# Patient Record
Sex: FEMALE | Race: ASIAN | NOT HISPANIC OR LATINO | Employment: FULL TIME | URBAN - METROPOLITAN AREA
[De-identification: names, ages, dates, MRNs, and addresses within clinical notes are randomized per-mention and may not be internally consistent; named-entity substitution may affect disease eponyms.]

---

## 2023-03-11 LAB — SARS-COV-2 RESULT: NOT DETECTED

## 2023-04-27 LAB
BASOPHILS (10*3/UL) IN BLOOD BY AUTOMATED COUNT: 0.01 X10E9/L (ref 0–0.1)
BASOPHILS/100 LEUKOCYTES IN BLOOD BY AUTOMATED COUNT: 0.2 % (ref 0–2)
EOSINOPHILS (10*3/UL) IN BLOOD BY AUTOMATED COUNT: 0.05 X10E9/L (ref 0–0.7)
EOSINOPHILS/100 LEUKOCYTES IN BLOOD BY AUTOMATED COUNT: 1 % (ref 0–6)
ERYTHROCYTE DISTRIBUTION WIDTH (RATIO) BY AUTOMATED COUNT: 12.9 % (ref 11.5–14.5)
ERYTHROCYTE MEAN CORPUSCULAR HEMOGLOBIN CONCENTRATION (G/DL) BY AUTOMATED: 32.3 G/DL (ref 32–36)
ERYTHROCYTE MEAN CORPUSCULAR VOLUME (FL) BY AUTOMATED COUNT: 90 FL (ref 80–100)
ERYTHROCYTES (10*6/UL) IN BLOOD BY AUTOMATED COUNT: 4.04 X10E12/L (ref 4–5.2)
ESTIMATED AVERAGE GLUCOSE FOR HBA1C: 103 MG/DL
HEMATOCRIT (%) IN BLOOD BY AUTOMATED COUNT: 36.5 % (ref 36–46)
HEMOGLOBIN (G/DL) IN BLOOD: 11.8 G/DL (ref 12–16)
HEMOGLOBIN A1C/HEMOGLOBIN TOTAL IN BLOOD: 5.2 %
IMMATURE GRANULOCYTES/100 LEUKOCYTES IN BLOOD BY AUTOMATED COUNT: 0.4 % (ref 0–0.9)
LEUKOCYTES (10*3/UL) IN BLOOD BY AUTOMATED COUNT: 5.1 X10E9/L (ref 4.4–11.3)
LYMPHOCYTES (10*3/UL) IN BLOOD BY AUTOMATED COUNT: 1.86 X10E9/L (ref 1.2–4.8)
LYMPHOCYTES/100 LEUKOCYTES IN BLOOD BY AUTOMATED COUNT: 36.5 % (ref 13–44)
MONOCYTES (10*3/UL) IN BLOOD BY AUTOMATED COUNT: 0.36 X10E9/L (ref 0.1–1)
MONOCYTES/100 LEUKOCYTES IN BLOOD BY AUTOMATED COUNT: 7.1 % (ref 2–10)
NEUTROPHILS (10*3/UL) IN BLOOD BY AUTOMATED COUNT: 2.79 X10E9/L (ref 1.2–7.7)
NEUTROPHILS/100 LEUKOCYTES IN BLOOD BY AUTOMATED COUNT: 54.8 % (ref 40–80)
NRBC (PER 100 WBCS) BY AUTOMATED COUNT: 0 /100 WBC (ref 0–0)
PLATELETS (10*3/UL) IN BLOOD AUTOMATED COUNT: 317 X10E9/L (ref 150–450)

## 2023-04-28 LAB
CALCIDIOL (25 OH VITAMIN D3) (NG/ML) IN SER/PLAS: 19 NG/ML
THYROTROPIN (MIU/L) IN SER/PLAS BY DETECTION LIMIT <= 0.05 MIU/L: 2.76 MIU/L (ref 0.44–3.98)

## 2023-11-30 DIAGNOSIS — Z34.90 PREGNANCY, UNSPECIFIED GESTATIONAL AGE (HHS-HCC): Primary | ICD-10-CM

## 2023-12-07 ENCOUNTER — INITIAL PRENATAL (OUTPATIENT)
Dept: OBSTETRICS AND GYNECOLOGY | Facility: CLINIC | Age: 33
End: 2023-12-07
Payer: COMMERCIAL

## 2023-12-07 ENCOUNTER — LAB (OUTPATIENT)
Dept: LAB | Facility: LAB | Age: 33
End: 2023-12-07
Payer: COMMERCIAL

## 2023-12-07 VITALS — SYSTOLIC BLOOD PRESSURE: 114 MMHG | WEIGHT: 131.8 LBS | DIASTOLIC BLOOD PRESSURE: 74 MMHG | BODY MASS INDEX: 24.9 KG/M2

## 2023-12-07 DIAGNOSIS — E55.9 VITAMIN D DEFICIENCY: ICD-10-CM

## 2023-12-07 DIAGNOSIS — Z32.01 ENCOUNTER FOR PREGNANCY TEST, RESULT POSITIVE (HHS-HCC): ICD-10-CM

## 2023-12-07 DIAGNOSIS — Z3A.09 9 WEEKS GESTATION OF PREGNANCY (HHS-HCC): ICD-10-CM

## 2023-12-07 DIAGNOSIS — Z82.79 FAMILY HISTORY OF NEURAL TUBE DEFECT: ICD-10-CM

## 2023-12-07 DIAGNOSIS — Z3A.09 9 WEEKS GESTATION OF PREGNANCY (HHS-HCC): Primary | ICD-10-CM

## 2023-12-07 DIAGNOSIS — K21.9 GASTROESOPHAGEAL REFLUX DISEASE, UNSPECIFIED WHETHER ESOPHAGITIS PRESENT: ICD-10-CM

## 2023-12-07 PROBLEM — R31.21 ASYMPTOMATIC MICROSCOPIC HEMATURIA: Status: ACTIVE | Noted: 2023-12-07

## 2023-12-07 PROBLEM — L70.0 ACNE VULGARIS: Status: ACTIVE | Noted: 2021-07-06

## 2023-12-07 PROBLEM — M62.89 HIGH-TONE PELVIC FLOOR DYSFUNCTION: Status: ACTIVE | Noted: 2023-12-07

## 2023-12-07 PROBLEM — K29.70 GASTRITIS: Status: ACTIVE | Noted: 2023-12-07

## 2023-12-07 LAB
ERYTHROCYTE [DISTWIDTH] IN BLOOD BY AUTOMATED COUNT: 12.3 % (ref 11.5–14.5)
EST. AVERAGE GLUCOSE BLD GHB EST-MCNC: 100 MG/DL
HBA1C MFR BLD: 5.1 %
HCT VFR BLD AUTO: 38.1 % (ref 36–46)
HGB BLD-MCNC: 12.7 G/DL (ref 12–16)
MCH RBC QN AUTO: 29.1 PG (ref 26–34)
MCHC RBC AUTO-ENTMCNC: 33.3 G/DL (ref 32–36)
MCV RBC AUTO: 87 FL (ref 80–100)
NRBC BLD-RTO: 0 /100 WBCS (ref 0–0)
PLATELET # BLD AUTO: 309 X10*3/UL (ref 150–450)
PREGNANCY TEST URINE, POC: POSITIVE
RBC # BLD AUTO: 4.37 X10*6/UL (ref 4–5.2)
REFLEX ADDED, ANEMIA PANEL: NORMAL
WBC # BLD AUTO: 8.9 X10*3/UL (ref 4.4–11.3)

## 2023-12-07 PROCEDURE — 87800 DETECT AGNT MULT DNA DIREC: CPT | Performed by: ADVANCED PRACTICE MIDWIFE

## 2023-12-07 PROCEDURE — 86803 HEPATITIS C AB TEST: CPT

## 2023-12-07 PROCEDURE — 87086 URINE CULTURE/COLONY COUNT: CPT | Performed by: ADVANCED PRACTICE MIDWIFE

## 2023-12-07 PROCEDURE — 83020 HEMOGLOBIN ELECTROPHORESIS: CPT | Performed by: ADVANCED PRACTICE MIDWIFE

## 2023-12-07 PROCEDURE — 86787 VARICELLA-ZOSTER ANTIBODY: CPT

## 2023-12-07 PROCEDURE — 0500F INITIAL PRENATAL CARE VISIT: CPT | Performed by: ADVANCED PRACTICE MIDWIFE

## 2023-12-07 PROCEDURE — 82306 VITAMIN D 25 HYDROXY: CPT

## 2023-12-07 PROCEDURE — 87389 HIV-1 AG W/HIV-1&-2 AB AG IA: CPT

## 2023-12-07 PROCEDURE — 36415 COLL VENOUS BLD VENIPUNCTURE: CPT

## 2023-12-07 PROCEDURE — 86901 BLOOD TYPING SEROLOGIC RH(D): CPT

## 2023-12-07 PROCEDURE — 84443 ASSAY THYROID STIM HORMONE: CPT

## 2023-12-07 PROCEDURE — 86780 TREPONEMA PALLIDUM: CPT

## 2023-12-07 PROCEDURE — 86850 RBC ANTIBODY SCREEN: CPT

## 2023-12-07 PROCEDURE — 83036 HEMOGLOBIN GLYCOSYLATED A1C: CPT

## 2023-12-07 PROCEDURE — 85027 COMPLETE CBC AUTOMATED: CPT

## 2023-12-07 PROCEDURE — 86317 IMMUNOASSAY INFECTIOUS AGENT: CPT

## 2023-12-07 PROCEDURE — 87340 HEPATITIS B SURFACE AG IA: CPT

## 2023-12-07 PROCEDURE — 86900 BLOOD TYPING SEROLOGIC ABO: CPT

## 2023-12-07 PROCEDURE — 83021 HEMOGLOBIN CHROMOTOGRAPHY: CPT

## 2023-12-07 RX ORDER — ERGOCALCIFEROL 1.25 MG/1
1 CAPSULE ORAL WEEKLY
COMMUNITY
Start: 2021-09-02

## 2023-12-07 RX ORDER — FAMOTIDINE 20 MG/1
20 TABLET, FILM COATED ORAL DAILY
Qty: 30 TABLET | Refills: 1 | Status: SHIPPED | OUTPATIENT
Start: 2023-12-07 | End: 2024-12-06

## 2023-12-07 RX ORDER — FAMOTIDINE 20 MG/1
TABLET, FILM COATED ORAL
COMMUNITY
Start: 2014-10-30 | End: 2023-12-07 | Stop reason: ALTCHOICE

## 2023-12-07 ASSESSMENT — EDINBURGH POSTNATAL DEPRESSION SCALE (EPDS)
I HAVE LOOKED FORWARD WITH ENJOYMENT TO THINGS: AS MUCH AS I EVER DID
I HAVE BLAMED MYSELF UNNECESSARILY WHEN THINGS WENT WRONG: NO, NEVER
THINGS HAVE BEEN GETTING ON TOP OF ME: NO, I HAVE BEEN COPING AS WELL AS EVER
THE THOUGHT OF HARMING MYSELF HAS OCCURRED TO ME: NEVER
I HAVE BEEN SO UNHAPPY THAT I HAVE BEEN CRYING: NO, NEVER
TOTAL SCORE: 0
I HAVE BEEN SO UNHAPPY THAT I HAVE HAD DIFFICULTY SLEEPING: NOT AT ALL
I HAVE BEEN ABLE TO LAUGH AND SEE THE FUNNY SIDE OF THINGS: AS MUCH AS I ALWAYS COULD
I HAVE FELT SCARED OR PANICKY FOR NO GOOD REASON: NO, NOT AT ALL
I HAVE FELT SAD OR MISERABLE: NO, NOT AT ALL
I HAVE BEEN ANXIOUS OR WORRIED FOR NO GOOD REASON: NO, NOT AT ALL

## 2023-12-07 NOTE — PROGRESS NOTES
Subjective   Patient ID [id number]   Rex Murphy is a 33 y.o.  at 9w0d with a working estimated date of delivery of 2024, by Last Menstrual Period who presents for an initial prenatal visit. This pregnancy is planned. 5 week took a home + [facility] in mid November.    Patient does not want to know sex of baby     Father of baby born with something on spine had to have surgery- suspected myelo meningioma   Her pregnancy is complicated by:  N/a    OB History    Para Term  AB Living   1             SAB IAB Ectopic Multiple Live Births                  # Outcome Date GA Lbr Kay[place]/2nd Weight Sex Delivery Anes PTL Lv   1 Current              [place]  Depression Scale Total: 0    Objective   Physical Exam  Weight: 59.8 kg (131 lb 12.8 oz)  Expected Total Weight Gain: 11.5 kg (25 lb)-16 kg (35 lb)   Pregravid BMI: 24.58  BP: 114/74 (HR 87)          Physical Exam  Constitutional:       Appearance: Normal appearance. She is normal weight.   Genitourinary:      Genitourinary Comments: deferred   Pulmonary:      Effort: Pulmonary effort is normal.      Breath sounds: Normal breath sounds.   Abdominal:      General: Abdomen is flat.      Palpations: Abdomen is soft.   Neurological:      Mental Status: She is alert.   Psychiatric:         Mood and Affect: Mood normal.         Behavior: Behavior normal.         Thought Content: Thought content normal.         Judgment: Judgment normal.         Prenatal Labs  Ordered additional TSH    Assessment/Plan   Problem List Items Addressed This Visit             ICD-10-CM    Family history of neural tube defect Z82.79     Patient and  will get more info-  Genetics consult placed           Other Visit Diagnoses         Codes    9 weeks gestation of pregnancy    -  Primary Z3A.09    Relevant Medications    famotidine (Pepcid) 20 mg tablet    Other Relevant Orders    CBC Anemia Panel With Reflex,Pregnancy    Type And Screen    Hemoglobin Identification with Path  Review    C. Trachomatis / N. Gonorrhoeae, Amplified Detection    Syphilis Screen with Reflex    Rubella Antibody, IgG    Hepatitis C Antibody    Urine Culture    Hepatitis B Surface Antigen    HIV 1/2 Antigen/Antibody Screen with Reflex to Confirmation    Hemoglobin A1C    Varicella Zoster Antibody, IgG    Referral to Genetics    [facility] imaging order    TSH with reflex to Free T4 if abnormal    Encounter for pregnancy test, result positive     Z32.01    Relevant Orders    POC pregnancy, urine (Completed)    TSH with reflex to Free T4 if abnormal    Vitamin D deficiency     E55.9    Relevant Orders    Vitamin D 25-Hydroxy,Total (for eval of Vitamin D levels)    Gastroesophageal reflux disease, unspecified whether esophagitis present     K21.9    Relevant Medications    famotidine (Pepcid) 20 mg tablet            Immunizations: reviewed with patient flus hot already complete   Prenatal Labs ordered  Daily prenatal vitamins prescribed  First trimester screening and second trimester screening discussed. Patient decided to go ahead with cffDNA will see genetics first  Follow up in 4 weeks for return OB visit.

## 2023-12-08 ENCOUNTER — ANCILLARY PROCEDURE (OUTPATIENT)
Dept: RADIOLOGY | Facility: CLINIC | Age: 33
End: 2023-12-08
Payer: COMMERCIAL

## 2023-12-08 DIAGNOSIS — Z3A.09 9 WEEKS GESTATION OF PREGNANCY (HHS-HCC): ICD-10-CM

## 2023-12-08 LAB
25(OH)D3 SERPL-MCNC: 37 NG/ML (ref 30–100)
ABO GROUP (TYPE) IN BLOOD: NORMAL
ANTIBODY SCREEN: NORMAL
BACTERIA UR CULT: NO GROWTH
C TRACH RRNA SPEC QL NAA+PROBE: NEGATIVE
HBV SURFACE AG SERPL QL IA: NONREACTIVE
HCV AB SER QL: NONREACTIVE
HIV 1+2 AB+HIV1 P24 AG SERPL QL IA: NONREACTIVE
N GONORRHOEA DNA SPEC QL PROBE+SIG AMP: NEGATIVE
RH FACTOR (ANTIGEN D): NORMAL
RUBV IGG SERPL IA-ACNC: 4.2 IA
RUBV IGG SERPL QL IA: POSITIVE
T PALLIDUM AB SER QL: NONREACTIVE
TSH SERPL-ACNC: 2.18 MIU/L (ref 0.44–3.98)
VARICELLA ZOSTER IGG INDEX: 1.8 IA
VZV IGG SER QL IA: POSITIVE

## 2023-12-08 PROCEDURE — 76801 OB US < 14 WKS SINGLE FETUS: CPT

## 2023-12-08 PROCEDURE — 76801 OB US < 14 WKS SINGLE FETUS: CPT | Performed by: OBSTETRICS & GYNECOLOGY

## 2023-12-11 LAB
HEMOGLOBIN A2: 3.1 % (ref 2–3.5)
HEMOGLOBIN A: 96.6 % (ref 95.8–98)
HEMOGLOBIN F: 0.3 % (ref 0–2)
HEMOGLOBIN IDENTIFICATION INTERPRETATION: NORMAL
PATH REVIEW-HGB IDENTIFICATION: NORMAL

## 2023-12-19 ENCOUNTER — CLINICAL SUPPORT (OUTPATIENT)
Dept: GENETICS | Facility: HOSPITAL | Age: 33
End: 2023-12-19
Payer: COMMERCIAL

## 2023-12-19 ENCOUNTER — LAB (OUTPATIENT)
Dept: LAB | Facility: LAB | Age: 33
End: 2023-12-19
Payer: COMMERCIAL

## 2023-12-19 VITALS
DIASTOLIC BLOOD PRESSURE: 73 MMHG | BODY MASS INDEX: 24.55 KG/M2 | WEIGHT: 130 LBS | SYSTOLIC BLOOD PRESSURE: 116 MMHG | HEIGHT: 61 IN

## 2023-12-19 DIAGNOSIS — Z13.79 ENCOUNTER FOR GENETIC SCREENING: Primary | ICD-10-CM

## 2023-12-19 DIAGNOSIS — Z3A.09 9 WEEKS GESTATION OF PREGNANCY (HHS-HCC): ICD-10-CM

## 2023-12-19 DIAGNOSIS — Z13.79 ENCOUNTER FOR GENETIC SCREENING: ICD-10-CM

## 2023-12-19 PROCEDURE — 36415 COLL VENOUS BLD VENIPUNCTURE: CPT

## 2023-12-19 PROCEDURE — 96040 HC GENETIC COUNSELING, EACH 30 MIN: CPT | Performed by: GENETIC COUNSELOR, MS

## 2023-12-19 PROCEDURE — 96040 PR MEDICAL GENETICS COUNSELING EACH 30 MINUTES: CPT | Performed by: GENETIC COUNSELOR, MS

## 2023-12-19 ASSESSMENT — PAIN SCALES - GENERAL: PAINLEVEL: 0-NO PAIN

## 2023-12-29 ENCOUNTER — TELEPHONE (OUTPATIENT)
Dept: GENETICS | Facility: CLINIC | Age: 33
End: 2023-12-29
Payer: COMMERCIAL

## 2023-12-29 NOTE — TELEPHONE ENCOUNTER
Patient was notified of her negative cell-free DNA results by phone.  Limitations of the screen were reviewed.  Patient declined to know suspected fetal sex.  Patient was informed that suspected fetal sex is on the report.  Patient verbalized understanding.     Yesenia Perdomo MS  Licensed Genetic Counselor

## 2024-01-02 NOTE — PROGRESS NOTES
Rex Murphy is a 33 y.o. old, , female who was approximately 10 weeks and 5 days pregnant at the time of our appointment with an EDC of 24.  She was seen to discuss her genetic screening and testing options due to the father of the pregnancy being born with a closed neural tube defect.      PAST HISTORY:  The patient reported that her  was born with a closed neural tube defect (type unknown) and had spinal surgery shortly after birth.  The patient reported that he does not have any congenital anomalies or learning problems.  The patient has not had any screening or diagnostic testing for aneuploidy in her pregnancy thus far.    FAMILY HISTORY:  Medical and family histories were reviewed and the following concerns regarding this pregnancy were apparent:      Ms. Murphy:  Mother- asthma  Maternal uncle- asthma  Maternal grandfather- asthma  Father- high blood pressure, unilateral ptosis   Paternal grandfather- Parkinson's disease, diagnosed in his 60's    Her , Shayy:  Personal history- as noted above  Mother- diabetes, breast cancer diagnosed at 51-52 years old, AML, diagnosed at 54,    Two maternal uncles- cancer (type unknown), diagnosed in their late 60's,   Maternal first cousin- autism   Father- hypertension     The remainder of the family history was negative for birth defects, intellectual disability, recurrent pregnancy loss, or recognized inherited conditions.  Consanguinity was denied.  The Pedigree is scanned in the Media tab and is available for a full review of the family history.      ETHNICITY:  The patient reported that she is of [place] ethnicity.  She reported that her  is also of [place] ethnicity.  Ashkenazi Mandaen Ancestry was denied.    We discussed the availability, benefits, and limitations of carrier screening for cystic fibrosis (CF), spinal muscular atrophy (SMA), and hemoglobinopathies/thalassemias. We reviewed the carrier frequencies of these  conditions, varied clinical manifestations, and their autosomal recessive inheritance. The patient has already had negative carrier screening for hemoglobinopathies and thalassemias via CBC and hemoglobin electrophoresis and these results were reviewed. If both members of the couple are found to be carriers for the same autosomal recessive condition, there is a 25% chance for an affected child and prenatal diagnosis would be available. Negative carrier screening does not rule out the possibility of being a carrier. [place] screening is available for CF, hemoglobinopathies, and thalassemias, and SMA.  We also discussed the availability of more expanded/pan ethnic carrier screening for additional, primarily autosomal recessive, conditions. We discussed the pros and cons of expanded carrier screening including the higher likelihood of being identified as a carrier for at least one condition on a larger panel. Approximately 4% of couples are found to be at risk to have a child with a genetic disorder based on this screening. In rare cases, expanded carrier screening results may have health implications for the tested individual.      After careful consideration, the patient declined all carrier screening.      COUNSELING:  The following information was discussed with your patient:    1. The patient reported that her  was born with a neural tube defect (closed defect). A neural tube defect may be an isolated finding or one feature of a chromosomal condition, single gene disorder, or multiple malformation syndrome.  The patient reported that her  does not have any other congenital anomalies and has normal learning and development.  When isolated (occurring alone), neural tube defects are often considered multifactorial in origin, involving a combination of genetic, environmental and unknown factors. If the defect is isolated, the risk for close relatives is increased.  If isolated, the risk for the couple's  offspring to have a neural tube defect is approximately 4% compared to the general population risk of 0.1- 0.5%.  If the defect is due to a chromosomal condition, single gene disorder, or multiple malformation syndrome, the risk to relatives may be significantly higher.     2. The general population risk of 3-5% for birth defects in every pregnancy.    3. Chromosomes, genes, non-disjunction, and common aneuploidies.  Based on the age of 34 at [facility] alone, at this gestation, the risk for the fetus to have Down syndrome is approximately 1 in 400.  The combined risk for the fetus to have Trisomy 21/18/13 is approximately 1 in 290.  This does not include copy number variation, mosaicism, single gene disorders, translocations, and marker chromosomes.     4. The availability of maternal serum screening during the first trimester.    5. The availability, benefits and limitations of ultrasound study. An ultrasound study is recommended at 12-14 weeks gestation for assessment of nuchal translucency and again at 19-20 weeks gestation to survey fetal organs. An ultrasound study in the second trimester can identify 50% of Down syndrome cases and 80-90% of trisomy 18 or trisomy 13 cases.   An anatomy ultrasound, at an experienced center, can detect approximately 90-95% of neural tube defects.     6. The availability, benefits and limitations of standard cell-free DNA screening.  We discussed the methodology for this screen, which includes using cell-free DNA obtained from a mother's blood (derived from the placenta) to screen for the presence of common chromosomal abnormalities. Depending on the laboratory used, there is a >99% sensitivity for Down syndrome, at least 97.4% sensitivity for trisomy 18, and at least 91% sensitivity for trisomy 13.  Specificity for these trisomies is >99%. Although sensitivity and specificity rates are high, in our experience the positive predictive value is dependent on many factors; whereas false  negative results are rare.  In addition, anticoagulants, maternal chromosome abnormality, fibroids or malignancy may impact results and/or be associated with inconclusive or other abnormal findings.  This is not a diagnostic test.  Therefore, in the event of an abnormal result, prenatal diagnosis through amniocentesis is recommended to confirm the findings, if confirmation is desired.  Results take approximately 7-10 days.      7. The methods, benefits, limitations and risks of CVS.  There is an approximate 1 in 200 risk of complications including a 1 in 400 risk for miscarriage. The approximate 1% risk of confined placental mosaicism in CVS was discussed.     8. The methods, benefits, limitations and risks of amniocentesis.  There is an approximate 1 in 400 risk of complications including a 1 in 800 risk of miscarriage.  Amniotic fluid AFP and acetylcholinesterase levels can determine if the fetus is likely to be affected with open spina bifida with a 99% detection rate.   We discussed that this does not screen for closed neural tube defects.      9. Additional Family History Information:  The patient reported that her 's mother was diagnosed with breast cancer at the age of 51-52 years old and AML at the age of 54 years old and is .  The patient reported that her  also had two maternal uncles diagnosed with cancer (type unknown) that are .  Cancer genetic counseling could be considered for this family and an appointment with our Cancer Genetics Clinic can be made by calling [phone].  At that time, an assessment of the family history of cancer, cancer genetic testing, personal cancer risk and options for risk reduction would then be discussed.      The patient's  has a family history of autism.  The patient did not know if the individual with autism had a genetic evaluation.  Autism may occur as part of a chromosome abnormality or single gene disorder.  When isolated, it is  "most often thought to result from a combination of genes and environment, also referred to as multifactorial causes. Without further information it is difficult to predict risk of autism for the current pregnancy, but may be increased above that of the general population. A general genetics evaluation is recommended for any individual with autism and an appointment can be made by calling [phone].  If an underlying genetic etiology in the family is determined, precise recurrence risks could be provided, and testing for other family members may be available if clinically indicated.    The patient's paternal grandfather was diagnosed with Parkinson's disease (PD) in his 60's.  Most cases of PD are sporadic and are probably due to complex interaction of environmental, unknown, and genetic factors. \"Approximately 15 percent of people with Parkinson disease have a family history of this disorder. Familial cases of Parkinson disease can be caused by mutations in the LRRK2, PARK7, PINK1, PRKN, or SNCA gene, or by alterations in genes that have not been identified. Mutations in some of these genes may also play a role in cases that appear to be sporadic (not inherited).\" (Genetics Home Reference 2020). There are other genetic risk factors for PD.  If the affected individual or other family members wish to explore these further, they may make a general genetic counseling appointment by calling [phone]. If an underlying genetic etiology in the family is determined, precise recurrence risks could be provided, and testing for other family members may be available if clinically indicated.    The patient has a family history of asthma and high blood pressure.   The patient's partner has a family history of hypertension. Often, these conditions are due to a combination of genetic and environmental factors (which often remain unknown).  The risk to have them often depends on the amount and degree of affected family " members.  It also depends on if an underlying genetic cause of these conditions can be identified.   With this history, the couple and their offspring are at an increased risk for the disorders in their respective families and this information should be shared with all relevant primary care providers.      The patient reported that her father has unilateral ptosis.  We reviewed that sometimes this can be due to an underlying genetic etiology.  If the patient's father would like to explore this further he may make a general genetic counseling appointment by calling [phone].   If an underlying genetic etiology in the family is determined, precise recurrence risks could be provided, and testing for other family members may be available if clinically indicated.      DISPOSITION:  The patient stated that she understood the above information and elected to proceed with standard cell-free DNA screening to [employer].  All carrier screening was declined. Diagnostic testing in the form of CVS and genetic amniocentesis was declined.      We recommend a NT ultrasound at 12 weeks.  We recommend an early anatomy ultrasound at 16 weeks.  We recommend a complete anatomy ultrasound at 19 weeks.    Thank you for allowing us to participate in the care of your patient.  Should you or your patient have any questions, please do not hesitate to contact our office at [phone].    Licensed Genetic Counselor Yesenia Perdomo MS, [facility] spent 46 minutes with the patient with greater than 50% of the time spent in face to face counseling.     Sincerely,     Yesenia Perdomo MS  Licensed Genetic Counselor    Reviewed by:  Dr. Ehsan Garces MD  Maternal Fetal Medicine Specialist

## 2024-01-04 ENCOUNTER — ROUTINE PRENATAL (OUTPATIENT)
Dept: OBSTETRICS AND GYNECOLOGY | Facility: CLINIC | Age: 34
End: 2024-01-04
Payer: COMMERCIAL

## 2024-01-04 VITALS — BODY MASS INDEX: 24.92 KG/M2 | DIASTOLIC BLOOD PRESSURE: 66 MMHG | SYSTOLIC BLOOD PRESSURE: 105 MMHG | WEIGHT: 131.9 LBS

## 2024-01-04 DIAGNOSIS — Z82.79 FAMILY HISTORY OF NEURAL TUBE DEFECT: ICD-10-CM

## 2024-01-04 DIAGNOSIS — Z3A.13 13 WEEKS GESTATION OF PREGNANCY (HHS-HCC): Primary | ICD-10-CM

## 2024-01-04 DIAGNOSIS — M62.89 HIGH-TONE PELVIC FLOOR DYSFUNCTION: ICD-10-CM

## 2024-01-04 PROCEDURE — 0501F PRENATAL FLOW SHEET: CPT | Performed by: ADVANCED PRACTICE MIDWIFE

## 2024-01-04 NOTE — PROGRESS NOTES
"Assessment/Plan   Problem List Items Addressed This Visit             ICD-10-CM       Ob-Gyn Problems    High-tone pelvic floor dysfunction N94.89       Other    Family history of neural tube defect Z82.79     Cff DNA normal   NT scheduled 13w5d          Other Visit Diagnoses         Codes    13 weeks gestation of pregnancy    -  Primary Z3A.13            Labs reviewed.  rrCFFDNA  NT scheduled next week     Reviewed s/sx of PTL, warning signs, fetal movement counts, and when to call provider  Follow up in 4 weeks for a routine prenatal visit.    DELICIA Gooden    Subjective     Rex Murphy is a 33 y.o.  at 13w0d with a working estimated date of delivery of 2024, by Last Menstrual Period who presents for a routine prenatal visit. She denies vaginal bleeding, leakage of fluid, decreased fetal movements, or contractions.    Her pregnancy is complicated by:  Pregnancy Problems (from 23 to present)       Problem Noted Resolved    Family history of neural tube defect 2023 by DELICIA Gooden No    Priority:  Medium      Overview Signed 2023 10:01 PM by DELICIA Gooden     Unknown specifically FOB had spine surgery at day 1 of life in [facility]                    Objective   Physical Exam  Weight: 59.8 kg (131 lb 14.4 oz)  Expected Total Weight Gain: 11.5 kg (25 lb)-16 kg (35 lb)   Pregravid BMI: 24.58  BP: 105/66 ()  Fetal Heart Rate: 164      Postpartum Depression: Low Risk  (2023)    [place]  Depression Scale     Last EPDS Total Score: 0     Last EPDS Self Harm Result: Never       Prenatal Labs  Lab Results   Component Value Date    HGB 12.7 2023    HCT 38.1 2023    ABO B 2023    HEPBSAG Nonreactive 2023     No results found for: \"GLUC1P\", \"GL3\"     Imaging  The most recent ultrasound was performed on   with a study GA of   and EFW of  .        "

## 2024-01-09 ENCOUNTER — PROCEDURE VISIT (OUTPATIENT)
Dept: RADIOLOGY | Facility: CLINIC | Age: 34
End: 2024-01-09
Payer: COMMERCIAL

## 2024-01-09 DIAGNOSIS — Z34.90 PREGNANT (HHS-HCC): ICD-10-CM

## 2024-01-09 PROCEDURE — 76801 OB US < 14 WKS SINGLE FETUS: CPT

## 2024-01-09 PROCEDURE — 76815 OB US LIMITED FETUS(S): CPT | Performed by: OBSTETRICS & GYNECOLOGY

## 2024-02-01 ENCOUNTER — ROUTINE PRENATAL (OUTPATIENT)
Dept: OBSTETRICS AND GYNECOLOGY | Facility: CLINIC | Age: 34
End: 2024-02-01
Payer: COMMERCIAL

## 2024-02-01 VITALS — WEIGHT: 133.6 LBS | SYSTOLIC BLOOD PRESSURE: 94 MMHG | DIASTOLIC BLOOD PRESSURE: 66 MMHG | BODY MASS INDEX: 25.24 KG/M2

## 2024-02-01 DIAGNOSIS — Z82.79 FAMILY HISTORY OF NEURAL TUBE DEFECT: ICD-10-CM

## 2024-02-01 DIAGNOSIS — O21.9 NAUSEA AND VOMITING IN PREGNANCY PRIOR TO 22 WEEKS GESTATION (HHS-HCC): ICD-10-CM

## 2024-02-01 DIAGNOSIS — O26.12 LOW WEIGHT GAIN DURING PREGNANCY IN SECOND TRIMESTER (HHS-HCC): ICD-10-CM

## 2024-02-01 DIAGNOSIS — Z3A.17 17 WEEKS GESTATION OF PREGNANCY (HHS-HCC): Primary | ICD-10-CM

## 2024-02-01 PROCEDURE — 0501F PRENATAL FLOW SHEET: CPT | Performed by: ADVANCED PRACTICE MIDWIFE

## 2024-02-01 RX ORDER — ONDANSETRON 4 MG/1
4 TABLET, FILM COATED ORAL EVERY 12 HOURS PRN
Qty: 14 TABLET | Refills: 0 | Status: SHIPPED | OUTPATIENT
Start: 2024-02-01 | End: 2024-02-08

## 2024-02-01 NOTE — PROGRESS NOTES
Assessment/Plan   Problem List Items Addressed This Visit             ICD-10-CM    Family history of neural tube defect Z82.79     Other Visit Diagnoses         Codes    17 weeks gestation of pregnancy    -  Primary Z3A.17    Low weight gain during pregnancy in second trimester     O26.12    Relevant Orders    Referral to Nutrition Services    Nausea and vomiting in pregnancy prior to 22 weeks gestation     O21.9    Relevant Medications    ondansetron (Zofran) 4 mg tablet          Patient worried about weight gain- too little - feeling nausea and averse to food- declines using zofran-   Offered nutrition consult     Labs reviewed.  Anatomy US scheduled  Discussed delivery planning [facility] main   Reviewed NT US     Reviewed s/sx of PTL, warning signs, fetal movement counts, and when to call provider  Follow up in 4 weeks for a routine prenatal visit.    DELICIA Gooden    Laura Murphy is a 33 y.o.  at 17w0d with a working estimated date of delivery of 2024, by Last Menstrual Period who presents for a routine prenatal visit. She denies vaginal bleeding, leakage of fluid, decreased fetal movements, or contractions.    Her pregnancy is complicated by:  Pregnancy Problems (from 23 to present)       Problem Noted Resolved    Family history of neural tube defect 2023 by DELICIA Gooden No    Priority:  Medium      Overview Addendum 2024 12:26 PM by DELICIA Gooden     Unknown specifically FOB had spine surgery at day 1 of life in [place]  Genetics consult placed and done see note                  Objective   Physical Exam  Weight: 60.6 kg (133 lb 9.6 oz)  Expected Total Weight Gain: 11.5 kg (25 lb)-16 kg (35 lb)   Pregravid BMI: 24.58  BP: 94/66 (HR 96)  Fetal Heart Rate: 145 Fundal Height (cm):  (1 below U)    Postpartum Depression: Low Risk  (2023)    [place]  Depression Scale     Last EPDS Total Score: 0     Last EPDS Self Harm  "Result: Never       Prenatal Labs  Lab Results   Component Value Date    HGB 12.7 12/07/2023    HCT 38.1 12/07/2023    ABO B 12/07/2023    HEPBSAG Nonreactive 12/07/2023     No results found for: \"GLUC1P\", \"GL3\"     Imaging  The most recent ultrasound was performed on   with a study GA of   and EFW of  .        "

## 2024-02-02 ENCOUNTER — TELEPHONE (OUTPATIENT)
Dept: OBSTETRICS AND GYNECOLOGY | Facility: CLINIC | Age: 34
End: 2024-02-02
Payer: COMMERCIAL

## 2024-02-02 NOTE — TELEPHONE ENCOUNTER
Called to schedule Nutrition consult. Left voicemail message with office contact information: [phone]

## 2024-02-05 ENCOUNTER — TELEPHONE (OUTPATIENT)
Dept: OBSTETRICS AND GYNECOLOGY | Facility: CLINIC | Age: 34
End: 2024-02-05
Payer: COMMERCIAL

## 2024-02-08 ENCOUNTER — NUTRITION (OUTPATIENT)
Dept: OBSTETRICS AND GYNECOLOGY | Facility: CLINIC | Age: 34
End: 2024-02-08
Payer: COMMERCIAL

## 2024-02-08 NOTE — PROGRESS NOTES
Nutrition Assessment     Reason for Visit:  Rex Mruphy is a 33 y.o. female who presents for telephone nutrition consult re: low wt gain in pregnancy.    Anthropometrics:       At pre-pregnancy BMI of 18.5- 24.9, total recommended weight gain is 25-35 pounds, with a recommended 2nd and 3rd trimester weight gain of 0.8 - 1.0 pounds per week.     Food And Nutrient Intake:  Food and Nutrient History  Food and Nutrient History: Per pt: previous signficant N/V and heartburn, unable to eat. States that symptoms have been much better over the past 2 days--almost no issues. Appetite has been very good--feeling like able to eat more regularly now. Is being mindful of intake to avoid triggering N/V again. Reports insomnia and restless legs, would like info re: magnesium. Would like healthy snack ideas.          OB Nutrition Intake  Weeks of Gestation: 18-0  Pre-Pregnancy Weight: ~130 lbs  Pre-Pregnancy BMI: 24  Overall Weight Change in Pregnancy: 3 lb net wt gain to-date.  Assessment of Weight Change: t pre-pregnancy BMI of 18.5- 24.9, total recommended weight gain is 25-35 pounds, with a recommended 2nd and 3rd trimester weight gain of 0.8 - 1.0 pounds per week.        Energy Needs  Estimated Energy Needs  Method for Estimating Needs: *Tracking prenatal weight gain is the recommended method of determining adequacy of caloric intake.*    1st trimester EER = Nonpregnant EER + 0 kcal   2nd trimester EER = Nonpregnant EER + 340 kcal ( 400 kcal if underweight pre-pregnancy)  3rd trimester EER = Nonpregnant EER + 452 kcal  (600 kcal if underweight)    (Plus additional 150 kcals/day avg. for multifetal pregnancy)    Women 14-18 years of age:   EER = 135.3 - (30.8 x age in years) + Physical Activity (see below) x ((10.0 x weight in kilograms) + (934 x height in meters)) + 25     Women 19 years of age and older:   EER = 354 - (6.91 x age in years) + Physical Activity (see below) x ((9.36 x weight in kilograms) + (726 x height in  meters))     Physical Activity Coefficients:    --Sedentary (e.g. typical daily living activities): 14-18 years = 1.0 ; 19 years and older = 1.0    --Low Active (e.g. typical daily living activities in addition to 30 -60 minutes of daily moderate activity): 14-18 years = 1.16 ; 19 years and older = 1.12    --Active (e.g. typical daily living activities in addition to at least 60 minutes of daily moderate activity): 14-18 years = 1.31 ; 19 years and older = 1.27    --Very Active (e.g. typical daily living activities in addition to at least 60 minutes of daily moderate activity plus an additional 60 minutes of vigorous activity or 120 minutes of moderate activity): 14-18 years = 1.56 ; 19 years and older = 1.45        Nutrition Diagnosis      Nutrition Diagnosis  Patient has Nutrition Diagnosis: Yes  Diagnosis Status (1): Resolved  Nutrition Diagnosis 1: Altered GI function  Related to (1): pregnancy  As Evidenced by (1): current IUP at 18 weeks and pt reporting significant N/V/reflux  Additional Nutrition Diagnosis: Diagnosis 2  Diagnosis Status (2): Ongoing  Nutrition Diagnosis 2: Increased nutrient needs  Related to (2): pregnancy  As Evidenced by (2): current IUP at 18 weeks    Nutrition Interventions/Recommendations   Food and Nutrition Delivery  Meals & Snacks: Modify Composition of Meals/Snacks, Modify schedule of foods/fluids  Goals: The following discussed with and emailed to Rex: 1. Eat your solid food first, then wait for at least 20-30 minutes to drink anything. This has helped many pregnant women have less nausea, vomiting and heartburn. (Also avoid drinking 30 minutes before eating.)  2. Try not to go beyond about 3-4 hours without at least a small meal or snack while you're awake. Getting too hungry can make you feel sick.  3. Whenever you eat, be sure to include protein food: meat, cheese, eggs, Greek yogurt, cottage cheese, nuts/seeds, nut butter. Protein helps keep you strong and helps baby grow  well.   4. Some quick and easy snack ideas:  a. Triscuits with cheese, peanut butter or hummus   i. Prepackaged whole grain crackers: https://www.[employer]/ip/Ozkwm-Rwthcxub-Lppuxrrw-Made-with-Whole-Grain-Crackers-Cheddar-Cheese-8-Packs-6-Feiavfvxcm-Gupo/82535029   b. [place] flour crackers- a crispy, salty snack that provides more protein than chips. Different flavors available.  i. https://www[employer]et[employer]rioDireto/ip/Simple-[place]-Flour-Crackers-Fine-Ground-Sea-Salt-Gluten-Free-4-25-oz/473472994   c. Fruit with cheese or peanut butter  d. Trail mix made with nuts and dried fruit  e. Greek yogurt (plain yogurt is delicious with honey added!)  f. 2% or 4% milkfat cottage cheese (provides more calories than fat free)  i. https://wwwProprietÃ¡rioDireto/ip/Alicja-Cottage-Cheese-with-Peaches-4-Qfzxttx-3-qt-Cup-Refrigerated-14g-of-Protein-per-serving/363173363?epkann=&adsRedirect=true   ii. https://www.[employer]/ip/Breakstone-s-Cottage-Doubles-Lowfat-Cottage-Cheese-with-2-Milkfat-and-Strawberry-Bsxrden-4-5-oz-Cup/720269951?from=/search   g. Gera Nut Butter Bars  i. https://www.[employer]/b/gera-nut-butter-filled/-/N-o335tyto05g   5. Magnesium - please see attached food lists. If you think you may need a supplement to help reach 360mg/day:  a. https://www.[employer]/p/nature-made-high-absorption-magnesium-glycinate-200mg-supplement-capsules-60ct/-/A-79863357   b. https://www.[employer]/p/doctor-best-high-absorption-100-chelated-magnesium-240-tablets/db-7013   c. Take magnesium at least 2 hours before or after prenatal vitamins.   6.  Potassium might also help with restless legs. Please see attached info sheet.  a. Try adding bananas, avocado and coconut water to your day  i. https://www.[employer]/p/pratibha-brit-original-coconut-water-1-l-carton/-/A-45294121#lnk=sametab    7. [employer]inn[employer].com for recipes to try.    Emailed Education Information: Magnesium and potassium content of foods.    Nutrition Monitoring and  Evaluation   Body Composition/Growth/Weight History  Monitoring and Evaluation Plan: Weight change  Weight Change: Weight gain  Criteria: At pre-pregnancy BMI of 18.5- 24.9, total recommended weight gain is 25-35 pounds, with a recommended 2nd and 3rd trimester weight gain of 0.8 - 1.0 pounds per week.  Nutrition Focused Physical Findings  Monitoring and Evaluation Plan: Digestive System  Digestive System: Nausea, Vomiting, Other (Comment)  Criteria: N/V resolved or controlled to the degree that it does not interfere w/intake and weight gain.  Other: Reflux resolved or controlled to the degree that it does not interfere w/intake and weight gain.    Follow-up as needed to assess goal attainment.     Rex expresses understanding and agreement.         Time Spent  Prep time on day of patient encounter: 5 minutes  Time spent directly with patient, family or caregiver: 23 minutes  Additional Time Spent on Patient Care Activities: 30 minutes  Documentation Time: 10 minutes  Other Time Spent: 0 minutes  Total: 68 minutes

## 2024-02-19 ENCOUNTER — HOSPITAL ENCOUNTER (OUTPATIENT)
Dept: RADIOLOGY | Facility: CLINIC | Age: 34
Discharge: HOME | End: 2024-02-19
Payer: COMMERCIAL

## 2024-02-19 DIAGNOSIS — Z34.90 PREGNANT (HHS-HCC): ICD-10-CM

## 2024-02-19 PROCEDURE — 76815 OB US LIMITED FETUS(S): CPT | Performed by: OBSTETRICS & GYNECOLOGY

## 2024-02-19 PROCEDURE — 76805 OB US >/= 14 WKS SNGL FETUS: CPT

## 2024-02-20 PROBLEM — O36.5990 FETAL GROWTH RESTRICTION ANTEPARTUM (HHS-HCC): Status: ACTIVE | Noted: 2024-02-20

## 2024-02-29 ENCOUNTER — ROUTINE PRENATAL (OUTPATIENT)
Dept: OBSTETRICS AND GYNECOLOGY | Facility: CLINIC | Age: 34
End: 2024-02-29
Payer: COMMERCIAL

## 2024-02-29 VITALS — BODY MASS INDEX: 26.49 KG/M2 | WEIGHT: 140.2 LBS | SYSTOLIC BLOOD PRESSURE: 108 MMHG | DIASTOLIC BLOOD PRESSURE: 73 MMHG

## 2024-02-29 DIAGNOSIS — O36.5990 FETAL GROWTH RESTRICTION ANTEPARTUM (HHS-HCC): ICD-10-CM

## 2024-02-29 DIAGNOSIS — Z3A.21 21 WEEKS GESTATION OF PREGNANCY (HHS-HCC): Primary | ICD-10-CM

## 2024-02-29 DIAGNOSIS — Z82.79 FAMILY HISTORY OF NEURAL TUBE DEFECT: ICD-10-CM

## 2024-02-29 PROCEDURE — 0501F PRENATAL FLOW SHEET: CPT | Performed by: ADVANCED PRACTICE MIDWIFE

## 2024-02-29 NOTE — PROGRESS NOTES
Assessment/Plan   Problem List Items Addressed This Visit             ICD-10-CM       Ob-Gyn Problems    Fetal growth restriction antepartum O36.5990     AC 23rd%tile EFW 10th%tile  reviewed with patient  Growth repeated 2 weeks            Other    Family history of neural tube defect Z82.79     Other Visit Diagnoses         Codes    21 weeks gestation of pregnancy    -  Primary Z3A.21          Labs reviewed.  Growth US scheduled for 3/14  Discussed diabetes screening and routine labs, to be completed next visit    Reviewed variations of timing of feeling fetal growth- if patient does not feel by US to let me know  1hr at next visit     Reviewed s/sx of PTL, warning signs, fetal movement counts, and when to call provider  Follow up in 4 weeks for a routine prenatal visit.    DELICIA Gooden    Laura Murphy is a 33 y.o.  at 21w0d with a working estimated date of delivery of 2024, by Last Menstrual Period who presents for a routine prenatal visit. She denies vaginal bleeding, leakage of fluid, decreased fetal movements, or contractions.    Her pregnancy is complicated by:  Pregnancy Problems (from 23 to present)       Problem Noted Resolved    Fetal growth restriction antepartum 2024 by DELICIA Gooden No    Priority:  Medium      Overview Signed 2024 12:10 PM by DELICIA Gooden     AC 23rd%tile EFW 10th%tile          Family history of neural tube defect 2023 by DELICIA Gooden No    Priority:  Medium      Overview Addendum 2024 12:26 PM by DELICIA Gooden     Unknown specifically FOB had spine surgery at day 1 of life in [place]  Genetics consult placed and done see note                  Objective   Physical Exam  Weight: 63.6 kg (140 lb 3.2 oz)  Expected Total Weight Gain: 11.5 kg (25 lb)-16 kg (35 lb)   Pregravid BMI: 24.58  BP: 108/73 (HR 88)  Fetal Heart Rate: 140 Fundal Height (cm): 22  "cm    Postpartum Depression: Low Risk  (2023)    [place]  Depression Scale     Last EPDS Total Score: 0     Last EPDS Self Harm Result: Never       Prenatal Labs  Lab Results   Component Value Date    HGB 12.7 2023    HCT 38.1 2023    ABO B 2023    HEPBSAG Nonreactive 2023     No results found for: \"GLUC1P\", \"GL3\"     Imaging  The most recent ultrasound was performed on   with a study GA of   and EFW of  .        "

## 2024-03-14 ENCOUNTER — HOSPITAL ENCOUNTER (OUTPATIENT)
Dept: RADIOLOGY | Facility: CLINIC | Age: 34
Discharge: HOME | End: 2024-03-14
Payer: COMMERCIAL

## 2024-03-14 DIAGNOSIS — Z34.90 PREGNANT (HHS-HCC): ICD-10-CM

## 2024-03-14 PROCEDURE — 76816 OB US FOLLOW-UP PER FETUS: CPT | Performed by: STUDENT IN AN ORGANIZED HEALTH CARE EDUCATION/TRAINING PROGRAM

## 2024-03-14 PROCEDURE — 76816 OB US FOLLOW-UP PER FETUS: CPT

## 2024-03-28 ENCOUNTER — ROUTINE PRENATAL (OUTPATIENT)
Dept: OBSTETRICS AND GYNECOLOGY | Facility: CLINIC | Age: 34
End: 2024-03-28
Payer: COMMERCIAL

## 2024-03-28 VITALS — SYSTOLIC BLOOD PRESSURE: 103 MMHG | WEIGHT: 147.2 LBS | DIASTOLIC BLOOD PRESSURE: 67 MMHG | BODY MASS INDEX: 27.81 KG/M2

## 2024-03-28 DIAGNOSIS — O36.5990 FETAL GROWTH RESTRICTION ANTEPARTUM (HHS-HCC): ICD-10-CM

## 2024-03-28 DIAGNOSIS — Z82.79 FAMILY HISTORY OF NEURAL TUBE DEFECT: ICD-10-CM

## 2024-03-28 DIAGNOSIS — Z3A.25 25 WEEKS GESTATION OF PREGNANCY (HHS-HCC): Primary | ICD-10-CM

## 2024-03-28 PROCEDURE — 0501F PRENATAL FLOW SHEET: CPT | Performed by: ADVANCED PRACTICE MIDWIFE

## 2024-03-28 ASSESSMENT — ENCOUNTER SYMPTOMS
PSYCHIATRIC NEGATIVE: 0
CONSTITUTIONAL NEGATIVE: 0
ALLERGIC/IMMUNOLOGIC NEGATIVE: 0
ENDOCRINE NEGATIVE: 0
MUSCULOSKELETAL NEGATIVE: 0
NEUROLOGICAL NEGATIVE: 0
GASTROINTESTINAL NEGATIVE: 0
EYES NEGATIVE: 0
RESPIRATORY NEGATIVE: 0
HEMATOLOGIC/LYMPHATIC NEGATIVE: 0
CARDIOVASCULAR NEGATIVE: 0

## 2024-03-28 NOTE — PROGRESS NOTES
Assessment/Plan   Problem List Items Addressed This Visit             ICD-10-CM       Ob-Gyn Problems    Fetal growth restriction antepartum O36.5990     Resolved! See 3/14 US  Growth US at 30wk            Other    Family history of neural tube defect Z82.79     Other Visit Diagnoses         Codes    25 weeks gestation of pregnancy    -  Primary Z3A.25            Labs reviewed.  Discussed diabetes screening and routine labs, to be completed next visit  Benefits of breastfeeding discussed with patient, breast pump ordered      Reviewed s/sx of PTL, warning signs, fetal movement counts, and when to call provider  Follow up in 3 weeks for a routine prenatal visit.    DELICIA Gooden    Laura Murphy is a 33 y.o.  at 25w0d with a working estimated date of delivery of 2024, by Last Menstrual Period who presents for a routine prenatal visit. She denies vaginal bleeding, leakage of fluid, decreased fetal movements, or contractions.    Her pregnancy is complicated by:  Pregnancy Problems (from 23 to present)       Problem Noted Resolved    Fetal growth restriction antepartum 2024 by DELICIA Gooden No    Priority:  Medium      Overview Signed 2024 12:10 PM by DELICIA Gooden     AC 23rd%tile EFW 10th%tile          Family history of neural tube defect 2023 by DELICIA Gooden No    Priority:  Medium      Overview Addendum 2024 12:26 PM by DELICIA Gooden     Unknown specifically FOB had spine surgery at day 1 of life in [place]  Genetics consult placed and done see note                  Objective   Physical Exam  Weight: 66.8 kg (147 lb 3.2 oz)  Expected Total Weight Gain: 11.5 kg (25 lb)-16 kg (35 lb)   Pregravid BMI: 24.58  BP: 103/67 (Pluse-99)  Fetal Heart Rate: 155 Fundal Height (cm): 26 cm    Postpartum Depression: Low Risk  (2023)    [place]  Depression Scale     Last EPDS Total Score:  "0     Last EPDS Self Harm Result: Never       Prenatal Labs  Lab Results   Component Value Date    HGB 12.7 12/07/2023    HCT 38.1 12/07/2023    ABO B 12/07/2023    HEPBSAG Nonreactive 12/07/2023     No results found for: \"GLUC1P\", \"GL3\"     Imaging  The most recent ultrasound was performed on   with a study GA of   and EFW of  .        "

## 2024-04-18 ENCOUNTER — ROUTINE PRENATAL (OUTPATIENT)
Dept: OBSTETRICS AND GYNECOLOGY | Facility: CLINIC | Age: 34
End: 2024-04-18
Payer: COMMERCIAL

## 2024-04-18 ENCOUNTER — LAB (OUTPATIENT)
Dept: LAB | Facility: LAB | Age: 34
End: 2024-04-18
Payer: COMMERCIAL

## 2024-04-18 VITALS — BODY MASS INDEX: 28.38 KG/M2 | WEIGHT: 150.2 LBS | DIASTOLIC BLOOD PRESSURE: 67 MMHG | SYSTOLIC BLOOD PRESSURE: 97 MMHG

## 2024-04-18 DIAGNOSIS — O26.843 UTERINE SIZE-DATE DISCREPANCY IN THIRD TRIMESTER (HHS-HCC): ICD-10-CM

## 2024-04-18 DIAGNOSIS — Z82.79 FAMILY HISTORY OF NEURAL TUBE DEFECT: ICD-10-CM

## 2024-04-18 DIAGNOSIS — R01.1 SYSTOLIC MURMUR: ICD-10-CM

## 2024-04-18 DIAGNOSIS — R73.09 GLUCOSE TOLERANCE TEST ABNORMAL: Primary | ICD-10-CM

## 2024-04-18 DIAGNOSIS — O99.013 ANEMIA AFFECTING PREGNANCY IN THIRD TRIMESTER (HHS-HCC): ICD-10-CM

## 2024-04-18 DIAGNOSIS — Z3A.28 28 WEEKS GESTATION OF PREGNANCY (HHS-HCC): Primary | ICD-10-CM

## 2024-04-18 DIAGNOSIS — Z3A.28 28 WEEKS GESTATION OF PREGNANCY (HHS-HCC): ICD-10-CM

## 2024-04-18 LAB
BNP SERPL-MCNC: 11 PG/ML (ref 0–99)
ERYTHROCYTE [DISTWIDTH] IN BLOOD BY AUTOMATED COUNT: 13.7 % (ref 11.5–14.5)
FERRITIN SERPL-MCNC: 26 NG/ML
GLUCOSE 1H P 50 G GLC PO SERPL-MCNC: 154 MG/DL
HCT VFR BLD AUTO: 31.8 % (ref 36–46)
HGB BLD-MCNC: 10 G/DL (ref 12–16)
IRON SATN MFR SERPL: 9 %
IRON SERPL-MCNC: 39 UG/DL
MCH RBC QN AUTO: 29.1 PG (ref 26–34)
MCHC RBC AUTO-ENTMCNC: 31.4 G/DL (ref 32–36)
MCV RBC AUTO: 92 FL (ref 80–100)
NRBC BLD-RTO: 0 /100 WBCS (ref 0–0)
PLATELET # BLD AUTO: 269 X10*3/UL (ref 150–450)
RBC # BLD AUTO: 3.44 X10*6/UL (ref 4–5.2)
REFLEX ADDED, ANEMIA PANEL: NORMAL
TIBC SERPL-MCNC: 458 UG/DL
TREPONEMA PALLIDUM IGG+IGM AB [PRESENCE] IN SERUM OR PLASMA BY IMMUNOASSAY: NONREACTIVE
UIBC SERPL-MCNC: 419 UG/DL
WBC # BLD AUTO: 7.4 X10*3/UL (ref 4.4–11.3)

## 2024-04-18 PROCEDURE — 82728 ASSAY OF FERRITIN: CPT

## 2024-04-18 PROCEDURE — 82947 ASSAY GLUCOSE BLOOD QUANT: CPT

## 2024-04-18 PROCEDURE — 83550 IRON BINDING TEST: CPT

## 2024-04-18 PROCEDURE — 82746 ASSAY OF FOLIC ACID SERUM: CPT

## 2024-04-18 PROCEDURE — 0501F PRENATAL FLOW SHEET: CPT | Performed by: ADVANCED PRACTICE MIDWIFE

## 2024-04-18 PROCEDURE — 86780 TREPONEMA PALLIDUM: CPT

## 2024-04-18 PROCEDURE — 36415 COLL VENOUS BLD VENIPUNCTURE: CPT

## 2024-04-18 PROCEDURE — 82607 VITAMIN B-12: CPT

## 2024-04-18 PROCEDURE — 83880 ASSAY OF NATRIURETIC PEPTIDE: CPT

## 2024-04-18 PROCEDURE — 85027 COMPLETE CBC AUTOMATED: CPT

## 2024-04-18 ASSESSMENT — ENCOUNTER SYMPTOMS
MUSCULOSKELETAL NEGATIVE: 0
ENDOCRINE NEGATIVE: 0
ALLERGIC/IMMUNOLOGIC NEGATIVE: 0
RESPIRATORY NEGATIVE: 0
CONSTITUTIONAL NEGATIVE: 0
CARDIOVASCULAR NEGATIVE: 0
EYES NEGATIVE: 0
GASTROINTESTINAL NEGATIVE: 0
PSYCHIATRIC NEGATIVE: 0
HEMATOLOGIC/LYMPHATIC NEGATIVE: 0
NEUROLOGICAL NEGATIVE: 0

## 2024-04-18 NOTE — PROGRESS NOTES
Assessment/Plan   Problem List Items Addressed This Visit             ICD-10-CM       Ob-Gyn Problems    Uterine size-date discrepancy in third trimester ([facility]) O26.843     20wk US :AC 23rd%tile EFW 10th%tile   Resolved!     3/14 US EFW 15th%tile Ac17th%tile    Recheck growth at 30wk            Other    Family history of neural tube defect Z82.79     Other Visit Diagnoses         Codes    28 weeks gestation of pregnancy ([facility])    -  Primary Z3A.28    Relevant Orders    CBC Anemia Panel With Reflex,Pregnancy    Glucose, 1 Hour Screen, Pregnancy    Syphilis Screen with Reflex    ECG 12 lead (Ancillary Performed)    Systolic murmur     R01.1    Relevant Orders    ECG 12 lead (Ancillary Performed)          Consulted with [facility] Dr. Mathew regarding patients newly diagnosed systolic murmur- recommends cardiology follow up, EKG, and BNP    Cardiac warning signs reviewed in depth- with patient and when to present to triage for any concerning cardiac symptoms    Growth US in 2 wks   Diabetes screening today glucola given   Reviewed s/sx of PTL, warning signs, fetal movement counts, and when to call provider  Follow up in 2 week for a routine prenatal visit.    DELICIA Gooden    Laura Murphy is a 33 y.o.  at 28w0d with a working estimated date of delivery of 2024, by Last Menstrual Period who presents for a routine prenatal visit. She denies vaginal bleeding, leakage of fluid, decreased fetal movements, or contractions.    Her pregnancy is complicated by:  Pregnancy Problems (from 23 to present)       Problem Noted Resolved    Fetal growth restriction antepartum ([facility]) 2024 by DELICIA Gooden No    Priority:  Medium      Overview Addendum 3/28/2024 12:05 PM by DELICIA Gooden     AC 23rd%tile EFW 10th%tile   Resolved!     3/14 US EFW 15th%tile Ac17th%tile         Family history of neural tube defect 2023 by Nieves Ortiz  "DELICIA No    Priority:  Medium      Overview Addendum 2024 12:26 PM by DELICIA Gooden     Unknown specifically FOB had spine surgery at day 1 of life in [place]  Genetics consult placed and done see note                  Objective   Physical Exam:   Weight: 68.1 kg (150 lb 3.2 oz)  Expected Total Weight Gain: 11.5 kg (25 lb)-16 kg (35 lb)   Pregravid BMI: 24.58  BP: 97/67 (Pluse-93)    GEN: NAD  CV: systolic murmur s1.s2 Gr 2  Lungs:CTAB, RR even unlabored   Fetal Heart Rate: 154 Fundal Height (cm): 30 cm Presentation: Vertex           Postpartum Depression: Low Risk  (2023)    [place]  Depression Scale     Last EPDS Total Score: 0     Last EPDS Self Harm Result: Never        Prenatal Labs  Lab Results   Component Value Date    HGB 12.7 2023    HCT 38.1 2023    ABO B 2023    HEPBSAG Nonreactive 2023     No results found for: \"GLUF\", \"GLUT1\", \"LTBFGUL0MK\", \"PABVAST1ZW\"  No results found for: \"GBS\"     Imaging  The most recent ultrasound was performed on   with a study GA of   and EFW of  .        "

## 2024-04-18 NOTE — ASSESSMENT & PLAN NOTE
20wk US :AC 23rd%tile EFW 10th%tile   Resolved!     3/14 US EFW 15th%tile Ac17th%tile    Recheck growth at 30wk

## 2024-04-19 PROBLEM — O99.013 ANEMIA AFFECTING PREGNANCY IN THIRD TRIMESTER (HHS-HCC): Status: ACTIVE | Noted: 2024-04-19

## 2024-04-19 PROBLEM — R73.09 GLUCOSE TOLERANCE TEST ABNORMAL: Status: ACTIVE | Noted: 2024-04-19

## 2024-04-19 LAB
FOLATE SERPL-MCNC: 15.1 NG/ML
VIT B12 SERPL-MCNC: 229 PG/ML

## 2024-04-19 RX ORDER — FERROUS SULFATE 325(65) MG
325 TABLET, DELAYED RELEASE (ENTERIC COATED) ORAL 2 TIMES DAILY
Qty: 60 TABLET | Refills: 11 | Status: SHIPPED | OUTPATIENT
Start: 2024-04-19 | End: 2025-04-19

## 2024-04-22 ENCOUNTER — HOSPITAL ENCOUNTER (OUTPATIENT)
Dept: CARDIOLOGY | Facility: HOSPITAL | Age: 34
Discharge: HOME | End: 2024-04-22
Payer: COMMERCIAL

## 2024-04-22 DIAGNOSIS — R01.1 SYSTOLIC MURMUR: ICD-10-CM

## 2024-04-22 DIAGNOSIS — Z3A.28 28 WEEKS GESTATION OF PREGNANCY (HHS-HCC): ICD-10-CM

## 2024-04-22 LAB
ATRIAL RATE: 96 BPM
P AXIS: 43 DEGREES
P OFFSET: 195 MS
P ONSET: 150 MS
PR INTERVAL: 146 MS
Q ONSET: 223 MS
QRS COUNT: 15 BEATS
QRS DURATION: 82 MS
QT INTERVAL: 342 MS
QTC CALCULATION(BAZETT): 432 MS
QTC FREDERICIA: 400 MS
R AXIS: 37 DEGREES
T AXIS: 44 DEGREES
T OFFSET: 394 MS
VENTRICULAR RATE: 96 BPM

## 2024-04-22 PROCEDURE — 93005 ELECTROCARDIOGRAM TRACING: CPT

## 2024-04-23 ENCOUNTER — LAB (OUTPATIENT)
Dept: LAB | Facility: LAB | Age: 34
End: 2024-04-23
Payer: COMMERCIAL

## 2024-04-23 DIAGNOSIS — Z3A.28 28 WEEKS GESTATION OF PREGNANCY (HHS-HCC): ICD-10-CM

## 2024-04-23 DIAGNOSIS — R73.09 GLUCOSE TOLERANCE TEST ABNORMAL: ICD-10-CM

## 2024-04-23 LAB
GLUCOSE 1H P 100 G GLC PO SERPL-MCNC: 200 MG/DL
GLUCOSE 2H P 100 G GLC PO SERPL-MCNC: 171 MG/DL
GLUCOSE 3H P 100 G GLC PO SERPL-MCNC: 154 MG/DL
GLUCOSE P FAST SERPL-MCNC: 88 MG/DL

## 2024-04-23 PROCEDURE — 82947 ASSAY GLUCOSE BLOOD QUANT: CPT

## 2024-04-23 PROCEDURE — 82950 GLUCOSE TEST: CPT

## 2024-04-23 PROCEDURE — 36415 COLL VENOUS BLD VENIPUNCTURE: CPT

## 2024-04-23 PROCEDURE — 82952 GTT-ADDED SAMPLES: CPT

## 2024-04-24 DIAGNOSIS — O24.419 GESTATIONAL DIABETES MELLITUS (GDM) IN THIRD TRIMESTER, GESTATIONAL DIABETES METHOD OF CONTROL UNSPECIFIED (HHS-HCC): Primary | ICD-10-CM

## 2024-04-25 DIAGNOSIS — O24.419 GESTATIONAL DIABETES MELLITUS (GDM) IN THIRD TRIMESTER, GESTATIONAL DIABETES METHOD OF CONTROL UNSPECIFIED (HHS-HCC): Primary | ICD-10-CM

## 2024-04-25 RX ORDER — DEXTROSE 4 G
TABLET,CHEWABLE ORAL
Qty: 1 EACH | Refills: 0 | Status: SHIPPED | OUTPATIENT
Start: 2024-04-25

## 2024-04-25 RX ORDER — LANCETS
EACH MISCELLANEOUS
Qty: 200 EACH | Refills: 3 | Status: SHIPPED | OUTPATIENT
Start: 2024-04-25 | End: 2024-05-20 | Stop reason: SDUPTHER

## 2024-04-25 RX ORDER — BLOOD SUGAR DIAGNOSTIC
STRIP MISCELLANEOUS
Qty: 150 EACH | Refills: 3 | Status: SHIPPED | OUTPATIENT
Start: 2024-04-25 | End: 2024-05-20 | Stop reason: SDUPTHER

## 2024-04-30 ENCOUNTER — TELEPHONE (OUTPATIENT)
Dept: MATERNAL FETAL MEDICINE | Facility: HOSPITAL | Age: 34
End: 2024-04-30
Payer: COMMERCIAL

## 2024-04-30 NOTE — TELEPHONE ENCOUNTER
Patient is new to the Bloods Sugar Support Line    Blood Sugar Support Line Communication   Communicated with the patient on 4/30/2024   She has Gestational Diabetes @ 29w5d    The patient checks her sugars fasting and 1 hour after meals. Her current regimen is as follows:  Nutrition plan alone   MFM appt next week.    It is unclear from documentation if patient has completed a visit with a Registered Dietician for nutrition education for GDM      The patient's reported blood sugars appear well controlled, with 80% within the goal range. Goal range glucose is Fasting <95, 1 hr after meals <140.   No changes to her current treatment plan are indicated at this time.    Patient understands to submit sugar log for review weekly through the Blood Sugar Line @ [phone] or via email to [email or web address] to help optimize glucose control.    A voice mail message was left at the contact number provided by the patient.

## 2024-05-02 ENCOUNTER — HOSPITAL ENCOUNTER (OUTPATIENT)
Dept: RADIOLOGY | Facility: CLINIC | Age: 34
Discharge: HOME | End: 2024-05-02
Payer: COMMERCIAL

## 2024-05-02 DIAGNOSIS — Z34.90 PREGNANT (HHS-HCC): ICD-10-CM

## 2024-05-02 PROCEDURE — 76819 FETAL BIOPHYS PROFIL W/O NST: CPT

## 2024-05-02 PROCEDURE — 76816 OB US FOLLOW-UP PER FETUS: CPT | Performed by: OBSTETRICS & GYNECOLOGY

## 2024-05-02 PROCEDURE — 76816 OB US FOLLOW-UP PER FETUS: CPT

## 2024-05-02 PROCEDURE — 76819 FETAL BIOPHYS PROFIL W/O NST: CPT | Performed by: OBSTETRICS & GYNECOLOGY

## 2024-05-03 ENCOUNTER — ROUTINE PRENATAL (OUTPATIENT)
Dept: OBSTETRICS AND GYNECOLOGY | Facility: CLINIC | Age: 34
End: 2024-05-03
Payer: COMMERCIAL

## 2024-05-03 VITALS
WEIGHT: 149.44 LBS | HEART RATE: 93 BPM | SYSTOLIC BLOOD PRESSURE: 106 MMHG | BODY MASS INDEX: 28.24 KG/M2 | DIASTOLIC BLOOD PRESSURE: 77 MMHG

## 2024-05-03 DIAGNOSIS — Z82.79 FAMILY HISTORY OF NEURAL TUBE DEFECT: ICD-10-CM

## 2024-05-03 DIAGNOSIS — O26.843 UTERINE SIZE-DATE DISCREPANCY IN THIRD TRIMESTER (HHS-HCC): ICD-10-CM

## 2024-05-03 DIAGNOSIS — O24.410 DIET CONTROLLED GESTATIONAL DIABETES MELLITUS (GDM) IN THIRD TRIMESTER (HHS-HCC): Primary | ICD-10-CM

## 2024-05-03 DIAGNOSIS — O99.013 ANEMIA AFFECTING PREGNANCY IN THIRD TRIMESTER (HHS-HCC): ICD-10-CM

## 2024-05-03 PROCEDURE — 0501F PRENATAL FLOW SHEET: CPT

## 2024-05-03 ASSESSMENT — PAIN - FUNCTIONAL ASSESSMENT: PAIN_FUNCTIONAL_ASSESSMENT: 0-10

## 2024-05-03 ASSESSMENT — PAIN SCALES - GENERAL: PAINLEVEL_OUTOF10: 0 - NO PAIN

## 2024-05-03 NOTE — PROGRESS NOTES
I saw and evaluated the patient. I personally obtained the key and critical portions of the history and physical exam or was physically present for key and critical portions performed by the resident/fellow. I reviewed the resident/fellow's documentation and discussed the patient with the resident/fellow. I agree with the resident/fellow's medical decision making as documented in the note.    Charito Mcqueen MD

## 2024-05-03 NOTE — PROGRESS NOTES
Subjective   Patient ID [id number]   Rex Murphy is a 33 y.o.  at 30w1d with a working estimated date of delivery of 2024, by Last Menstrual Period who presents for a routine prenatal visit. She denies vaginal bleeding, leakage of fluid, decreased fetal movements, or contractions.    Patient reports shortness of breath while lying flat a few days ago that has since improved.    Objective   Physical Exam:   Weight: 67.8 kg (149 lb 7 oz)  Expected Total Weight Gain: 11.5 kg (25 lb)-16 kg (35 lb)   Pregravid BMI: 24.58  BP: 106/77  Fetal Heart Rate: 145 Fundal Height (cm): 30 cm             Prenatal Labs  Urine Dip:  Lab Results   Component Value Date    KETONESU NEGATIVE 2021     Lab Results   Component Value Date    HGB 10.0 (L) 2024    HCT 31.8 (L) 2024    ABO B 2023    HEPBSAG Nonreactive 2023       Assessment/Plan   Problem List Items Addressed This Visit       Anemia affecting pregnancy in third trimester ([facility])    Overview      10.0 Hgb Ferritin 26  Started on PO fe BID         Current Assessment & Plan     Check CBC/retic at next appt         Family history of neural tube defect    Overview     Unknown specifically FOB had spine surgery at day 1 of life in [place]  Genetics consult placed and done see note         Gestational diabetes mellitus (GDM) in third trimester ([facility]) - Primary    Overview     1hr 154  3hr abnormal     MFM appt on   Currently well-controlled with diet changes           Uterine size-date discrepancy in third trimester ([facility])    Overview     AC 23rd%tile EFW 10th%tile   Resolved!      US EFW 31%tile Ac16th%tile           Reviewed pain control methods for labor.   Reviewed si/sx of preeclampsia.    Seen and discussed w/ Dr. Richar Hardy MD PGY-1  Obstetrics & Gynecology

## 2024-05-06 ENCOUNTER — PATIENT MESSAGE (OUTPATIENT)
Dept: MATERNAL FETAL MEDICINE | Facility: CLINIC | Age: 34
End: 2024-05-06
Payer: COMMERCIAL

## 2024-05-08 ENCOUNTER — DOCUMENTATION (OUTPATIENT)
Dept: MATERNAL FETAL MEDICINE | Facility: CLINIC | Age: 34
End: 2024-05-08
Payer: COMMERCIAL

## 2024-05-08 NOTE — PROGRESS NOTES
The patient attended the Gestational Diabetes Self-Management VIRTUAL Group Education Program    Overview of Diabetes    Type 1    Type 2    Gestational       -Risks to baby and Mom  Self-monitoring     Tips for Testing/troubleshooting glucometers    Goal range for blood sugars (<95 fasting, <140 1 hour postprandial for all meals)    Record Keeping    Blood Sugar Line    Blood Sugar Log Sheets - provided  Managing Diabetes     Physical Activity - recommendation is about 150 minutes per week    Medication        Oral/insulin overview  Additional  testing     NST/BPP/Growth ultrasound - general overview  Postpartum     Expectations in the hospital    Follow up - recommend fasting, 75g OGGT, 6-8 weeks after delivery     50% change of developing GDM in another pregnancy    50% chance of developing T2DM within next 10 years    Up to 30% of patients will have pre-diabetes or T2DM following delivery       Breastfeeding is strongly encouraged   Special considerations, self-management    Hypoglycemia    Hyperglycemia    Sick Day Rules  Resilience training/stress management    Engage your senses    Gratitude practice  Emotional support     “Baby Blues”    Postpartum Depression       When to call for help  Nutrition planning     Identify carbohydrates, serving size, carbohydrate counting    “Free Foods” - very low carbohydrate options    Label Reading    Personalized Meal Plan     3 meals + 3 snacks = approximately 175g carbohydrates/day    Follow up for 1:1 Registered Dietician consult       [phone] to schedule appointment    Individual consult with Maternal Fetal Medicine provider is scheduled.

## 2024-05-09 ENCOUNTER — INITIAL PRENATAL (OUTPATIENT)
Dept: MATERNAL FETAL MEDICINE | Facility: CLINIC | Age: 34
End: 2024-05-09
Payer: COMMERCIAL

## 2024-05-09 VITALS — SYSTOLIC BLOOD PRESSURE: 101 MMHG | DIASTOLIC BLOOD PRESSURE: 69 MMHG | BODY MASS INDEX: 28.34 KG/M2 | WEIGHT: 150 LBS

## 2024-05-09 DIAGNOSIS — O24.419 GESTATIONAL DIABETES MELLITUS (GDM) IN THIRD TRIMESTER, GESTATIONAL DIABETES METHOD OF CONTROL UNSPECIFIED (HHS-HCC): ICD-10-CM

## 2024-05-09 PROCEDURE — 99214 OFFICE O/P EST MOD 30 MIN: CPT | Performed by: STUDENT IN AN ORGANIZED HEALTH CARE EDUCATION/TRAINING PROGRAM

## 2024-05-09 PROCEDURE — 99204 OFFICE O/P NEW MOD 45 MIN: CPT | Performed by: STUDENT IN AN ORGANIZED HEALTH CARE EDUCATION/TRAINING PROGRAM

## 2024-05-10 NOTE — PROGRESS NOTES
MFM CONSULT NOTE  Referring Clinician: Nieves Ortiz CNM  Reason for consult: GDM    HPI: Rex Murphy is a 33 y.o.  at 31w0d presenting for consultation for GDM.    GDM was diagnosed at 29w based on 3/4 abnormal values on the 3h GTT (normal fasting BG of 88). She attended boot camp and has been attempting dietary modification. She has been logging BG. 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     Ms. Murphy has no additional complaints today.    Patient Active Problem List   Diagnosis    Acne vulgaris    Asymptomatic microscopic hematuria    Gastritis    High-tone pelvic floor dysfunction    Family history of neural tube defect    Uterine size-date discrepancy in third trimester ([facility])    Anemia affecting pregnancy in third trimester ([facility])    Gestational diabetes mellitus (GDM) in third trimester ([facility])       OB History          1    Para        Term                AB        Living             SAB        IAB        Ectopic        Multiple        Live Births                   Past Medical History:   Diagnosis Date    Syncope and collapse 2021    Near syncope       Past Surgical History:   Procedure Laterality Date    OTHER SURGICAL HISTORY  2021    No history of surgery     Current Outpatient Medications on File Prior to Visit   Medication Sig Dispense Refill    blood sugar diagnostic (Accu-Chek Guide test strips) strip Use 1 strip 4-6 times per day to test blood sugar during pregnancy 150 each 3    blood-glucose meter (Accu-Chek Guide Glucose Meter) misc Use for testing blood sugar during pregnancy 1 each 0    ergocalciferol (Vitamin D-2) 1.25 MG (70503 UT) capsule Take 1 capsule (1,250 mcg) by mouth once a week.      famotidine (Pepcid) 20 mg tablet Take 1 tablet (20 mg) by mouth once daily. 30 tablet 1    ferrous sulfate 325 (65 Fe) MG EC tablet Take 1 tablet by mouth 2 times a  day. Take 1 tablet twice daily every other day- with orange juice or other high citrus beverage or food 60 tablet 11    lancets (Accu-Chek Softclix Lancets) misc Use 1 lancet 4-6 times per day during pregnancy 200 each 3    prenatal vitamin, iron-folic, 27 mg iron-800 mcg folic acid tablet Take 1 tablet by mouth once daily. 90 tablet 3     No current facility-administered medications on file prior to visit.       Allergies   Allergen Reactions    Amoxicillin GI Upset     Social History     Tobacco Use    Smoking status: Never     Passive exposure: Never    Smokeless tobacco: Never   Vaping Use    Vaping status: Never Used   Substance Use Topics    Alcohol use: Never    Drug use: Never       family history includes Asthma in her mother; Hypertension in her father.      OBJECTIVE  Visit Vitals  /69   Wt 68 kg (150 lb)   LMP 10/05/2023 (Exact Date)   BMI 28.34 kg/m²   OB Status Pregnant   Smoking Status Never   BSA 1.71 m²       Physical exam  Well appearing, NAD    Blood glucose log reviewed (5 days). 1 borderline elevated fasting BG 95. All other values within goal.    ASSESSMENT & PLAN    Rex Murphy is a 33 y.o.  at 31w1d here for the following:    Gestational diabetes mellitus (GDM) in third trimester ([facility])  Patient was recently diagnosed with gestational diabetes (GDM).  We discussed the pregnancy implications of the diagnosis including increased risk of pre-eclampsia, LGA/macrosomia, shoulder dystocia, stillbirth as well as  hypoglycemia, hyperbilirubinemia and respiratory distress.  We reviewed the importance of glycemic control and its impact on lowering these risks.  Discussed management ranging from dietary changes to pharmacotherapy and that insulin is considered first line therapy rather than oral agents if medication is ultimately needed.  Discussed our recommendation for serial growth ultrasounds and starting  testing at 32 weeks if treatment is required.  There is  potential persistence of impaired glucose tolerance post pregnancy in up to 30% of patients and 50% risk of IGT/T2DM in the next 10 years with an overall lifetime risk of T2DM of 70%. Postpartum screening may occur at 6 weeks post-partum (can be performed as soon as 2 days after delivery) and, if normal, routine screening every 1-3 years. We did discuss that a healthy diet and maintenance of a normal body weight may reduce those risks    In summary the following is recommended:    We will continue to co-manage diabetes via the Bloodsugar line with weekly assessment of glycemic control. Recommended annotating BG log with night shifts/timing - if insulin is needed, then timing/frequency may need to be adjusted based on day-to-day schedule due to frequent night shifts.  Current regimen is: dietary control  We will plan for a follow up MD visit at 36 weeks to assess overall control and provide delivery timing recommendations.  Recommend serial growth ultrasounds in the third trimester.  Weekly  testing is recommended starting at 32 weeks IF medication is required OR there is a LGA growth pattern.  Twice weekly testing is recommended at 32 weeks if control is suboptimal, there is polyhydramnios, or medication is required AND there is a LGA growth pattern.  Delivery is recommended at 39 weeks, though if glycemic control is suboptimal then delivery at 37-39 weeks may be considered. Recommend 36 week follow up visit with MFM to confirm delivery timing.  If the EFW is >4500g at the time of delivery  should be considered.  A 2hr GTT is recommended 6 weeks postpartum (can be performed as soon as 2 days postpartum), if normal then screening for T2DM is recommended at least every 3 years.     Thank you for allowing us to participate in the care of your patient. We anticipate she should be able to continue her general care under your supervision and we will continue to follow her to manage her GDM. Please do not  hesitate to contact us with any questions.    Yamilet Dotson MD PhD   Maternal Fetal Medicine

## 2024-05-10 NOTE — ASSESSMENT & PLAN NOTE
Patient was recently diagnosed with gestational diabetes (GDM).  We discussed the pregnancy implications of the diagnosis including increased risk of pre-eclampsia, LGA/macrosomia, shoulder dystocia, stillbirth as well as  hypoglycemia, hyperbilirubinemia and respiratory distress.  We reviewed the importance of glycemic control and its impact on lowering these risks.  Discussed management ranging from dietary changes to pharmacotherapy and that insulin is considered first line therapy rather than oral agents if medication is ultimately needed.  Discussed our recommendation for serial growth ultrasounds and starting  testing at 32 weeks if treatment is required.  There is potential persistence of impaired glucose tolerance post pregnancy in up to 30% of patients and 50% risk of IGT/T2DM in the next 10 years with an overall lifetime risk of T2DM of 70%. Postpartum screening may occur at 6 weeks post-partum (can be performed as soon as 2 days after delivery) and, if normal, routine screening every 1-3 years. We did discuss that a healthy diet and maintenance of a normal body weight may reduce those risks    In summary the following is recommended:    We will continue to co-manage diabetes via the Bloodsugar line with weekly assessment of glycemic control. Recommended annotating BG log with night shifts/timing - if insulin is needed, then timing/frequency may need to be adjusted based on day-to-day schedule due to frequent night shifts.  Current regimen is: dietary control  We will plan for a follow up MD visit at 36 weeks to assess overall control and provide delivery timing recommendations.  Recommend serial growth ultrasounds in the third trimester.  Weekly  testing is recommended starting at 32 weeks IF medication is required OR there is a LGA growth pattern.  Twice weekly testing is recommended at 32 weeks if control is suboptimal, there is polyhydramnios, or medication is required AND  there is a LGA growth pattern.  Delivery is recommended at 39 weeks, though if glycemic control is suboptimal then delivery at 37-39 weeks may be considered. Recommend 36 week follow up visit with MFM to confirm delivery timing.  If the EFW is >4500g at the time of delivery  should be considered.  A 2hr GTT is recommended 6 weeks postpartum (can be performed as soon as 2 days postpartum), if normal then screening for T2DM is recommended at least every 3 years.

## 2024-05-14 ENCOUNTER — PATIENT MESSAGE (OUTPATIENT)
Dept: MATERNAL FETAL MEDICINE | Facility: HOSPITAL | Age: 34
End: 2024-05-14
Payer: COMMERCIAL

## 2024-05-16 ENCOUNTER — PROCEDURE VISIT (OUTPATIENT)
Dept: OBSTETRICS AND GYNECOLOGY | Facility: CLINIC | Age: 34
End: 2024-05-16
Payer: COMMERCIAL

## 2024-05-16 ENCOUNTER — LAB (OUTPATIENT)
Dept: LAB | Facility: LAB | Age: 34
End: 2024-05-16
Payer: COMMERCIAL

## 2024-05-16 ENCOUNTER — ROUTINE PRENATAL (OUTPATIENT)
Dept: OBSTETRICS AND GYNECOLOGY | Facility: CLINIC | Age: 34
End: 2024-05-16
Payer: COMMERCIAL

## 2024-05-16 VITALS — DIASTOLIC BLOOD PRESSURE: 65 MMHG | BODY MASS INDEX: 28.42 KG/M2 | SYSTOLIC BLOOD PRESSURE: 96 MMHG | WEIGHT: 150.4 LBS

## 2024-05-16 DIAGNOSIS — O24.410 DIET CONTROLLED GESTATIONAL DIABETES MELLITUS (GDM) IN THIRD TRIMESTER (HHS-HCC): ICD-10-CM

## 2024-05-16 DIAGNOSIS — Z82.79 FAMILY HISTORY OF NEURAL TUBE DEFECT: ICD-10-CM

## 2024-05-16 DIAGNOSIS — O36.8131 DECREASED FETAL MOVEMENTS IN THIRD TRIMESTER, FETUS 1 OF MULTIPLE GESTATION (HHS-HCC): ICD-10-CM

## 2024-05-16 DIAGNOSIS — O99.013 ANEMIA AFFECTING PREGNANCY IN THIRD TRIMESTER (HHS-HCC): ICD-10-CM

## 2024-05-16 DIAGNOSIS — Z34.03 PRIMIGRAVIDA, THIRD TRIMESTER (HHS-HCC): ICD-10-CM

## 2024-05-16 DIAGNOSIS — O26.843 UTERINE SIZE-DATE DISCREPANCY IN THIRD TRIMESTER (HHS-HCC): ICD-10-CM

## 2024-05-16 DIAGNOSIS — Z34.03 PRIMIGRAVIDA, THIRD TRIMESTER (HHS-HCC): Primary | ICD-10-CM

## 2024-05-16 LAB
ERYTHROCYTE [DISTWIDTH] IN BLOOD BY AUTOMATED COUNT: 14.4 % (ref 11.5–14.5)
FERRITIN SERPL-MCNC: 23 NG/ML
HCT VFR BLD AUTO: 34.9 % (ref 36–46)
HGB BLD-MCNC: 11.2 G/DL (ref 12–16)
HGB RETIC QN: 32 PG (ref 28–38)
IMMATURE RETIC FRACTION: 20 %
MCH RBC QN AUTO: 29.6 PG (ref 26–34)
MCHC RBC AUTO-ENTMCNC: 32.1 G/DL (ref 32–36)
MCV RBC AUTO: 92 FL (ref 80–100)
NRBC BLD-RTO: 0 /100 WBCS (ref 0–0)
PLATELET # BLD AUTO: 243 X10*3/UL (ref 150–450)
RBC # BLD AUTO: 3.79 X10*6/UL (ref 4–5.2)
REFLEX ADDED, ANEMIA PANEL: NORMAL
RETICS #: 0.11 X10*6/UL (ref 0.02–0.08)
RETICS/RBC NFR AUTO: 2.8 % (ref 0.5–2)
WBC # BLD AUTO: 6.3 X10*3/UL (ref 4.4–11.3)

## 2024-05-16 PROCEDURE — 82728 ASSAY OF FERRITIN: CPT

## 2024-05-16 PROCEDURE — 36415 COLL VENOUS BLD VENIPUNCTURE: CPT

## 2024-05-16 PROCEDURE — 59025 FETAL NON-STRESS TEST: CPT | Performed by: ADVANCED PRACTICE MIDWIFE

## 2024-05-16 PROCEDURE — 85045 AUTOMATED RETICULOCYTE COUNT: CPT

## 2024-05-16 PROCEDURE — 0501F PRENATAL FLOW SHEET: CPT | Performed by: ADVANCED PRACTICE MIDWIFE

## 2024-05-16 PROCEDURE — 85027 COMPLETE CBC AUTOMATED: CPT

## 2024-05-16 NOTE — PROGRESS NOTES
Subjective     Rex Murphy is a 33 y.o.  at 32w0d with a working estimated date of delivery of 2024, by Last Menstrual Period who presents for a routine prenatal visit.     She denies vaginal bleeding, leakage of fluid, or contractions.    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 - doesn't like feeling of tightness     GDMA1 - calling blood sugars weekly, every Monday   Anemia - trying to take iron consistently     Objective   Physical Exam:   Weight: 68.2 kg (150 lb 6.4 oz)  Expected Total Weight Gain: 11.5 kg (25 lb)-16 kg (35 lb)   Pregravid BMI: 24.58  BP: 96/65 (HR 94)  Fetal Heart Rate: 140 Fundal Height (cm): 32 cm Presentation: Vertex           Postpartum Depression: Low Risk  (2023)    [place]  Depression Scale     Last EPDS Total Score: 0     Last EPDS Self Harm Result: Never        Problem List Items Addressed This Visit       Uterine size-date discrepancy in third trimester ([facility])    Overview     AC 23rd%tile EFW 10th%tile   Resolved!       EFW 31%tile Ac16th%tile         Primigravida, third trimester ([facility]) - Primary    Gestational diabetes mellitus (GDM) in third trimester ([facility])    Overview     1hr 154  3hr abnormal     MFM appt on   Currently well-controlled with diet changes  2024 : nutrition  2024 Nutrition plan alone              Family history of neural tube defect    Overview     Unknown specifically FOB had spine surgery at day 1 of life in [place]  Genetics consult placed and done see note         Decreased fetal movements in third trimester ([facility])    Relevant Orders    Fetal nonstress test, once    Anemia affecting pregnancy in third trimester ([facility])    Overview      10.0 Hgb Ferritin 26  Started on PO fe BID         Relevant Orders     CBC Anemia Panel With Reflex,Pregnancy (Completed)    Reticulocytes (Completed)    Ferritin, Pregnancy (Completed)     NST today --> reactive   Reviewed growth US scheduled 5/30  Discussed recommendation for IOL in setting of GDMA1, per MFM delivery @ 39 weeks unless suboptimal glycemia control than may warrant delivery earlier at 37-39 weeks. Follow up with MFM @ 36 weeks for delivery planning    Discussed expectations and methods used for IOL  Reviewed s/sx of PTL, warning signs, fetal movement counts, and when to call provider  Follow up in 2 week for a routine prenatal visit.      Dianelys Magaña, APRN-CNM, APRN-CNP

## 2024-05-17 PROBLEM — O36.8130 DECREASED FETAL MOVEMENTS IN THIRD TRIMESTER (HHS-HCC): Status: ACTIVE | Noted: 2024-05-17

## 2024-05-20 DIAGNOSIS — O24.419 GESTATIONAL DIABETES MELLITUS (GDM) IN THIRD TRIMESTER, GESTATIONAL DIABETES METHOD OF CONTROL UNSPECIFIED (HHS-HCC): ICD-10-CM

## 2024-05-20 RX ORDER — BLOOD SUGAR DIAGNOSTIC
STRIP MISCELLANEOUS
Qty: 150 EACH | Refills: 3 | Status: SHIPPED | OUTPATIENT
Start: 2024-05-20

## 2024-05-20 RX ORDER — LANCETS
EACH MISCELLANEOUS
Qty: 200 EACH | Refills: 3 | Status: SHIPPED | OUTPATIENT
Start: 2024-05-20

## 2024-05-21 ENCOUNTER — PATIENT MESSAGE (OUTPATIENT)
Dept: MATERNAL FETAL MEDICINE | Facility: HOSPITAL | Age: 34
End: 2024-05-21
Payer: COMMERCIAL

## 2024-05-28 ENCOUNTER — PATIENT MESSAGE (OUTPATIENT)
Dept: MATERNAL FETAL MEDICINE | Facility: HOSPITAL | Age: 34
End: 2024-05-28
Payer: COMMERCIAL

## 2024-05-30 ENCOUNTER — HOSPITAL ENCOUNTER (OUTPATIENT)
Dept: RADIOLOGY | Facility: CLINIC | Age: 34
Discharge: HOME | End: 2024-05-30
Payer: COMMERCIAL

## 2024-05-30 DIAGNOSIS — Z32.01 PREGNANCY TEST POSITIVE (HHS-HCC): ICD-10-CM

## 2024-05-30 DIAGNOSIS — O24.419 GESTATIONAL DIABETES (HHS-HCC): ICD-10-CM

## 2024-05-30 PROCEDURE — 76819 FETAL BIOPHYS PROFIL W/O NST: CPT

## 2024-05-30 PROCEDURE — 76819 FETAL BIOPHYS PROFIL W/O NST: CPT | Performed by: STUDENT IN AN ORGANIZED HEALTH CARE EDUCATION/TRAINING PROGRAM

## 2024-05-30 PROCEDURE — 76816 OB US FOLLOW-UP PER FETUS: CPT | Performed by: STUDENT IN AN ORGANIZED HEALTH CARE EDUCATION/TRAINING PROGRAM

## 2024-05-30 PROCEDURE — 76816 OB US FOLLOW-UP PER FETUS: CPT

## 2024-05-31 ENCOUNTER — ROUTINE PRENATAL (OUTPATIENT)
Dept: OBSTETRICS AND GYNECOLOGY | Facility: CLINIC | Age: 34
End: 2024-05-31
Payer: COMMERCIAL

## 2024-05-31 VITALS
WEIGHT: 151.4 LBS | SYSTOLIC BLOOD PRESSURE: 110 MMHG | HEART RATE: 82 BPM | DIASTOLIC BLOOD PRESSURE: 73 MMHG | BODY MASS INDEX: 28.61 KG/M2

## 2024-05-31 DIAGNOSIS — Z23 NEED FOR TDAP VACCINATION: Primary | ICD-10-CM

## 2024-05-31 DIAGNOSIS — O26.843 UTERINE SIZE-DATE DISCREPANCY IN THIRD TRIMESTER (HHS-HCC): ICD-10-CM

## 2024-05-31 DIAGNOSIS — Z82.79 FAMILY HISTORY OF NEURAL TUBE DEFECT: ICD-10-CM

## 2024-05-31 DIAGNOSIS — Z3A.34 34 WEEKS GESTATION OF PREGNANCY (HHS-HCC): ICD-10-CM

## 2024-05-31 DIAGNOSIS — O99.013 ANEMIA AFFECTING PREGNANCY IN THIRD TRIMESTER (HHS-HCC): ICD-10-CM

## 2024-05-31 DIAGNOSIS — Z34.03 PRIMIGRAVIDA, THIRD TRIMESTER (HHS-HCC): ICD-10-CM

## 2024-05-31 DIAGNOSIS — O36.8131 DECREASED FETAL MOVEMENTS IN THIRD TRIMESTER, FETUS 1 OF MULTIPLE GESTATION (HHS-HCC): ICD-10-CM

## 2024-05-31 DIAGNOSIS — O24.410 DIET CONTROLLED GESTATIONAL DIABETES MELLITUS (GDM) IN THIRD TRIMESTER (HHS-HCC): ICD-10-CM

## 2024-05-31 PROCEDURE — 0501F PRENATAL FLOW SHEET: CPT

## 2024-05-31 PROCEDURE — 90715 TDAP VACCINE 7 YRS/> IM: CPT | Mod: GC

## 2024-05-31 ASSESSMENT — ENCOUNTER SYMPTOMS
MUSCULOSKELETAL NEGATIVE: 0
PSYCHIATRIC NEGATIVE: 0
GASTROINTESTINAL NEGATIVE: 0
ENDOCRINE NEGATIVE: 0
CARDIOVASCULAR NEGATIVE: 0
RESPIRATORY NEGATIVE: 0
NEUROLOGICAL NEGATIVE: 0
EYES NEGATIVE: 0
CONSTITUTIONAL NEGATIVE: 0
ALLERGIC/IMMUNOLOGIC NEGATIVE: 0
HEMATOLOGIC/LYMPHATIC NEGATIVE: 0

## 2024-05-31 ASSESSMENT — PATIENT HEALTH QUESTIONNAIRE - PHQ9
2. FEELING DOWN, DEPRESSED OR HOPELESS: NOT AT ALL
SUM OF ALL RESPONSES TO PHQ9 QUESTIONS 1 AND 2: 0
1. LITTLE INTEREST OR PLEASURE IN DOING THINGS: NOT AT ALL

## 2024-05-31 ASSESSMENT — PAIN - FUNCTIONAL ASSESSMENT: PAIN_FUNCTIONAL_ASSESSMENT: 0-10

## 2024-05-31 ASSESSMENT — PAIN SCALES - GENERAL: PAINLEVEL_OUTOF10: 0 - NO PAIN

## 2024-05-31 NOTE — PROGRESS NOTES
OB Follow-up  2024         SUBJECTIVE    HPI: Rex Murphy is a 33 y.o.  at 34w1d here for RPNV. Denies contractions, bleeding, or LOF. Reports normal fetal movement. Patient reports occasional aches and pains that self resolve.      OBJECTIVE  Visit Vitals  /73   Pulse 82   Wt 68.7 kg (151 lb 6.4 oz)   LMP 10/05/2023 (Exact Date)   BMI 28.61 kg/m²   OB Status Pregnant   Smoking Status Never   BSA 1.72 m²        ASSESSMENT & PLAN    Rex Murphy is a 33 y.o.  at 34w1d here for the following concerns we addressed today:    Gestational diabetes mellitus (GDM) in third trimester ([facility])  - Diet controlled  - BG log reviewed 100% in goal range  - Has 36 wk visit with MFM for delivery planning  - Had growth US yesterday with AGA growth and normal fluid    Uterine size-date discrepancy in third trimester ([facility])  - Appropriate fundal height today  - AGA on growth US yesterday    34 weeks gestation of pregnancy ([facility])  - Does not recall having any vaccinations this pregnancy  - TDAP admnistered  - for GBS at next visit  - PNC otherwise UTD  - Planning for Natural Family Planning, as she has used this for the last 4 years. Reviewed safe pregnancy intervals.  - Plans to breastfeed, has pump    Anemia affecting pregnancy in third trimester ([facility])  Hgb  was 11.2  Continue PO iron       Orders Placed This Encounter   Procedures    Tdap vaccine, age 7 years and older  (BOOSTRIX)        RTC in 2 weeks    Seen &Discussed with Dr. Richar Silva MD, PGY-1

## 2024-05-31 NOTE — ASSESSMENT & PLAN NOTE
- Does not recall having any vaccinations this pregnancy  - TDAP admnistered  - for GBS at next visit  - PNC otherwise UTD  - Planning for Natural Family Planning, as she has used this for the last 4 years. Reviewed safe pregnancy intervals.  - Plans to breastfeed, has pump

## 2024-05-31 NOTE — ASSESSMENT & PLAN NOTE
- Diet controlled  - BG log reviewed 100% in goal range  - Has 36 wk visit with MFM for delivery planning  - Had growth US yesterday with AGA growth and normal fluid

## 2024-06-04 ENCOUNTER — PATIENT MESSAGE (OUTPATIENT)
Dept: MATERNAL FETAL MEDICINE | Facility: HOSPITAL | Age: 34
End: 2024-06-04
Payer: COMMERCIAL

## 2024-06-11 ENCOUNTER — PATIENT MESSAGE (OUTPATIENT)
Dept: MATERNAL FETAL MEDICINE | Facility: HOSPITAL | Age: 34
End: 2024-06-11
Payer: COMMERCIAL

## 2024-06-13 NOTE — PROGRESS NOTES
Rex Murphy is a 34yo  @ 36w1d presenting for an [facility] delivery planning visit in the setting of well controlled A1GDM. She has been following up with the sugar line.     No complaints today.     O: Visit Vitals  /74   Wt 70.6 kg (155 lb 9.6 oz)   LMP 10/05/2023 (Exact Date)   BMI 29.40 kg/m²   OB Status Pregnant   Smoking Status Never   BSA 1.74 m²      Gen- NAD  Abd- gravid, nontender  Ext- symmetrical, nontender    Sono at 34w, AGA 31%ile     A/P: 34yo  @ 36w1d presenting for a delivery planning visit.   GDM  - Well controlled on diet alone. Continue follow up with DM sugar line.   - AGA fetus. Repeat growth in 2 weeks.   - Recommend IOL at 39 weeks.     2. PNC  Continue routine care with primary OB.   GBS collected per patient request.     Maureen Dowd MD  Maternal Fetal Medicine  Director of Fetal Intervention

## 2024-06-14 ENCOUNTER — ROUTINE PRENATAL (OUTPATIENT)
Dept: MATERNAL FETAL MEDICINE | Facility: CLINIC | Age: 34
End: 2024-06-14
Payer: COMMERCIAL

## 2024-06-14 VITALS — BODY MASS INDEX: 29.4 KG/M2 | SYSTOLIC BLOOD PRESSURE: 109 MMHG | WEIGHT: 155.6 LBS | DIASTOLIC BLOOD PRESSURE: 74 MMHG

## 2024-06-14 DIAGNOSIS — O24.410 DIET CONTROLLED GESTATIONAL DIABETES MELLITUS (GDM) IN THIRD TRIMESTER (HHS-HCC): Primary | ICD-10-CM

## 2024-06-14 DIAGNOSIS — Z34.90 ENCOUNTER FOR SUPERVISION OF NORMAL PREGNANCY, ANTEPARTUM, UNSPECIFIED GRAVIDITY (HHS-HCC): ICD-10-CM

## 2024-06-14 LAB
POC APPEARANCE, URINE: CLEAR
POC BILIRUBIN, URINE: NEGATIVE
POC BLOOD, URINE: NEGATIVE
POC COLOR, URINE: YELLOW
POC GLUCOSE, URINE: NEGATIVE MG/DL
POC KETONES, URINE: NEGATIVE MG/DL
POC LEUKOCYTES, URINE: NEGATIVE
POC NITRITE,URINE: NEGATIVE
POC PH, URINE: 6 PH
POC PROTEIN, URINE: NEGATIVE MG/DL
POC SPECIFIC GRAVITY, URINE: >=1.03
POC UROBILINOGEN, URINE: 0.2 EU/DL

## 2024-06-14 PROCEDURE — 99214 OFFICE O/P EST MOD 30 MIN: CPT | Performed by: OBSTETRICS & GYNECOLOGY

## 2024-06-14 PROCEDURE — 87081 CULTURE SCREEN ONLY: CPT | Performed by: OBSTETRICS & GYNECOLOGY

## 2024-06-14 PROCEDURE — 81003 URINALYSIS AUTO W/O SCOPE: CPT | Performed by: OBSTETRICS & GYNECOLOGY

## 2024-06-16 LAB — GP B STREP GENITAL QL CULT: NORMAL

## 2024-06-18 ENCOUNTER — PATIENT MESSAGE (OUTPATIENT)
Dept: MATERNAL FETAL MEDICINE | Facility: HOSPITAL | Age: 34
End: 2024-06-18
Payer: COMMERCIAL

## 2024-06-21 ENCOUNTER — TELEPHONE (OUTPATIENT)
Dept: OBSTETRICS AND GYNECOLOGY | Facility: CLINIC | Age: 34
End: 2024-06-21
Payer: COMMERCIAL

## 2024-06-21 ENCOUNTER — ROUTINE PRENATAL (OUTPATIENT)
Dept: OBSTETRICS AND GYNECOLOGY | Facility: CLINIC | Age: 34
End: 2024-06-21
Payer: COMMERCIAL

## 2024-06-21 VITALS — SYSTOLIC BLOOD PRESSURE: 112 MMHG | WEIGHT: 156.5 LBS | BODY MASS INDEX: 29.57 KG/M2 | DIASTOLIC BLOOD PRESSURE: 79 MMHG

## 2024-06-21 DIAGNOSIS — Z34.03 PRIMIGRAVIDA, THIRD TRIMESTER (HHS-HCC): ICD-10-CM

## 2024-06-21 DIAGNOSIS — O24.419 GESTATIONAL DIABETES MELLITUS (GDM) IN THIRD TRIMESTER, GESTATIONAL DIABETES METHOD OF CONTROL UNSPECIFIED (HHS-HCC): Primary | ICD-10-CM

## 2024-06-21 DIAGNOSIS — O24.410 DIET CONTROLLED GESTATIONAL DIABETES MELLITUS (GDM) IN THIRD TRIMESTER (HHS-HCC): ICD-10-CM

## 2024-06-21 DIAGNOSIS — Z3A.34 34 WEEKS GESTATION OF PREGNANCY (HHS-HCC): ICD-10-CM

## 2024-06-21 DIAGNOSIS — Z82.79 FAMILY HISTORY OF NEURAL TUBE DEFECT: ICD-10-CM

## 2024-06-21 DIAGNOSIS — O99.013 ANEMIA AFFECTING PREGNANCY IN THIRD TRIMESTER (HHS-HCC): ICD-10-CM

## 2024-06-21 DIAGNOSIS — O26.843 UTERINE SIZE-DATE DISCREPANCY IN THIRD TRIMESTER (HHS-HCC): ICD-10-CM

## 2024-06-21 PROCEDURE — 0501F PRENATAL FLOW SHEET: CPT | Performed by: ADVANCED PRACTICE MIDWIFE

## 2024-06-21 NOTE — LETTER
June 21, 2024     Patient: Rex Murphy   YOB: 1990   Date of Visit: 6/21/2024       To Whom It May Concern:    It is my medical opinion that Rex Murphy should start maternity leave today. Planning delivery early due to medical complications 7/4-7/11    If you have any questions or concerns, please don't hesitate to call.         Sincerely,        AARON Gooden-RAYMUNDO    CC: No Recipients

## 2024-06-21 NOTE — TELEPHONE ENCOUNTER
----- Message from Yamilet Meadows RN sent at 6/21/2024  3:45 PM EDT -----  Regarding: IOL  please schedule for IOL 39wk for GDM she has some preferences about which days

## 2024-06-21 NOTE — TELEPHONE ENCOUNTER
Induction of labor per GARCÍA WALKER-RAYMUNDO at 39 weeks GA for GDM  Scheduled for 7/4/24   Arrive at 1100  Patient notified  Visitor Policy reviewed.

## 2024-06-21 NOTE — PROGRESS NOTES
Subjective   Rxe Murphy is a 33 y.o.  at 37w1d with a working estimated date of delivery of 2024, by Last Menstrual Period who presents for a routine prenatal visit.     She denies vaginal bleeding, leakage of fluid, decreased fetal movements, or contractions.    Lengthy discussion regarding IOL and mechanisms used to induce labor    Objective   Physical Exam:   Weight: 71 kg (156 lb 8 oz)  TW kg (26 lb 8 oz)  BP: 112/79 (94)  Fetal Heart Rate: 152 Fundal Height (cm): 38 cm Presentation: Vertex         Postpartum Depression: Low Risk  (2023)    [place]  Depression Scale     Last EPDS Total Score: 0     Last EPDS Self Harm Result: Never        Prenatal Labs  Lab Results   Component Value Date    HGB 11.2 (L) 2024    HCT 34.9 (L) 2024     2024    SYPHT Nonreactive 2024     Lab Results   Component Value Date    GLUC1P 154 (H) 2024     Group B Strep Screen   Date Value Ref Range Status   2024 No Group B Streptococcus (GBS) isolated  Final        Imaging  The most recent ultrasound was performed on  with a study GA of 34w0d and EFW 31%, AC 27%.     Assessment/Plan   Problem List Items Addressed This Visit          Ob-Gyn Problems    34 weeks gestation of pregnancy ([facility])    Primigravida, third trimester ([facility])    Uterine size-date discrepancy in third trimester ([facility])    Overview     AC 23rd%tile EFW 10th%tile   Resolved!       EFW 31%tile Ac16th%tile            Other    Anemia affecting pregnancy in third trimester ([facility])    Overview      10.0 Hgb Ferritin 26  Started on PO fe BID         Family history of neural tube defect    Overview     Unknown specifically FOB had spine surgery at day 1 of life in [place]  Genetics consult placed and done see note         Gestational diabetes mellitus (GDM) in third trimester ([facility])    Overview     1hr 154  3hr abnormal     MFM appt on   Currently well-controlled with diet  changes  5/6/2024 : nutrition  5/14/2024 Nutrition plan alone   5/21/2024 Nutrition plan alone   5/28/2024 Nutrition plan alone   6/4/2024 Nutrition plan   6/11/2024 No change - Nutrition plan     6/18/2024 Nutrition plan                Discussed routine GBS screening, negative  Discussed recommendation for IOL in setting of gdma1  IOL scheduled 39th week  Reviewed s/sx Labor, warning signs, fetal movement counts, and when to call provider  Follow up in 1 week for a routine prenatal visit.    Nieves Ortiz, AARON-RONNIEM

## 2024-06-25 ENCOUNTER — PATIENT MESSAGE (OUTPATIENT)
Dept: MATERNAL FETAL MEDICINE | Facility: HOSPITAL | Age: 34
End: 2024-06-25
Payer: COMMERCIAL

## 2024-06-27 ENCOUNTER — HOSPITAL ENCOUNTER (OUTPATIENT)
Dept: RADIOLOGY | Facility: CLINIC | Age: 34
Discharge: HOME | End: 2024-06-27
Payer: COMMERCIAL

## 2024-06-27 DIAGNOSIS — Z32.01 PREGNANCY TEST POSITIVE (HHS-HCC): ICD-10-CM

## 2024-06-27 DIAGNOSIS — O24.410 DIET CONTROLLED GESTATIONAL DIABETES MELLITUS (GDM) IN THIRD TRIMESTER (HHS-HCC): ICD-10-CM

## 2024-06-27 PROCEDURE — 76819 FETAL BIOPHYS PROFIL W/O NST: CPT | Performed by: OBSTETRICS & GYNECOLOGY

## 2024-06-27 PROCEDURE — 76819 FETAL BIOPHYS PROFIL W/O NST: CPT

## 2024-06-27 PROCEDURE — 76816 OB US FOLLOW-UP PER FETUS: CPT

## 2024-06-27 PROCEDURE — 76816 OB US FOLLOW-UP PER FETUS: CPT | Performed by: OBSTETRICS & GYNECOLOGY

## 2024-06-28 ENCOUNTER — ROUTINE PRENATAL (OUTPATIENT)
Dept: OBSTETRICS AND GYNECOLOGY | Facility: CLINIC | Age: 34
End: 2024-06-28
Payer: COMMERCIAL

## 2024-06-28 VITALS — DIASTOLIC BLOOD PRESSURE: 77 MMHG | WEIGHT: 156.3 LBS | BODY MASS INDEX: 29.53 KG/M2 | SYSTOLIC BLOOD PRESSURE: 113 MMHG

## 2024-06-28 DIAGNOSIS — O26.843 UTERINE SIZE-DATE DISCREPANCY IN THIRD TRIMESTER (HHS-HCC): ICD-10-CM

## 2024-06-28 DIAGNOSIS — Z3A.38 38 WEEKS GESTATION OF PREGNANCY (HHS-HCC): ICD-10-CM

## 2024-06-28 DIAGNOSIS — O24.410 DIET CONTROLLED GESTATIONAL DIABETES MELLITUS (GDM) IN THIRD TRIMESTER (HHS-HCC): ICD-10-CM

## 2024-06-28 DIAGNOSIS — O09.93 ENCOUNTER FOR SUPERVISION OF HIGH RISK PREGNANCY IN THIRD TRIMESTER, ANTEPARTUM (HHS-HCC): Primary | ICD-10-CM

## 2024-06-28 DIAGNOSIS — O99.013 ANEMIA AFFECTING PREGNANCY IN THIRD TRIMESTER (HHS-HCC): ICD-10-CM

## 2024-06-28 PROCEDURE — 0501F PRENATAL FLOW SHEET: CPT | Performed by: NURSE PRACTITIONER

## 2024-06-28 ASSESSMENT — ENCOUNTER SYMPTOMS
CARDIOVASCULAR NEGATIVE: 0
ALLERGIC/IMMUNOLOGIC NEGATIVE: 0
NEUROLOGICAL NEGATIVE: 0
CONSTITUTIONAL NEGATIVE: 0
GASTROINTESTINAL NEGATIVE: 0
EYES NEGATIVE: 0
ENDOCRINE NEGATIVE: 0
RESPIRATORY NEGATIVE: 0
PSYCHIATRIC NEGATIVE: 0
HEMATOLOGIC/LYMPHATIC NEGATIVE: 0
MUSCULOSKELETAL NEGATIVE: 0

## 2024-06-28 NOTE — PROGRESS NOTES
Subjective   Rex Murphy is a 33 y.o.  at 38w1d with a working estimated date of delivery of 2024, by Last Menstrual Period who presents for a routine prenatal visit.     Patient doing well, reports intermittent ryder guo contractions and feeling increased pelvic pressure. She denies vaginal bleeding, leakage of fluid, decreased fetal movements, or contractions.    Objective   Physical Exam:   Weight: 70.9 kg (156 lb 4.8 oz)  TW.9 kg (26 lb 4.8 oz)  BP: 113/77 (pluse-91)  Fetal Heart Rate: 140 Fundal Height (cm): 38 cm Presentation: Vertex  Dilation: Closed      Postpartum Depression: Low Risk  (2023)    [place]  Depression Scale     Last EPDS Total Score: 0     Last EPDS Self Harm Result: Never      Imaging  The most recent ultrasound was performed on 24 with a study GA of 38.0 and EFW 31%, AC 18%.     Assessment/Plan   Problem List Items Addressed This Visit          Ob-Gyn Problems    Uterine size-date discrepancy in third trimester ([facility])    Overview     AC 23rd%tile EFW 10th%tile   Resolved!       EFW 31%tile Ac16th%tile            Other    Anemia affecting pregnancy in third trimester ([facility])    Overview      10.0 Hgb Ferritin 26, started on PO fe BID   hgb = 11.2         Gestational diabetes mellitus (GDM) in third trimester ([facility])    Overview     1hr 154  3hr abnormal     MFM appt on   Currently well-controlled with diet changes  2024 : nutrition  2024 Nutrition plan alone   2024 Nutrition plan alone   2024 Nutrition plan alone   2024 Nutrition plan   2024 No change - Nutrition plan     2024 Nutrition plan    2024  Nutrition plan                Other Visit Diagnoses       Encounter for supervision of high risk pregnancy in third trimester, antepartum ([facility])    -  Primary    38 weeks gestation of pregnancy ([facility])            Discussed expectations and methods used for IOL  IOL scheduled for  7/4/24  Reviewed s/sx of labor, warning signs, fetal movement counts, and when to call provider  Follow up in 4-6 week for a routine postpartum visit.    Lisset Shukla, AARON-RAYMUNDO, APRN-CNP

## 2024-06-28 NOTE — PATIENT INSTRUCTIONS
Labor Induction vs. Expectant Management  This handout was made to help you make the best choice on how to proceed after 39 weeks with factual, evidenced based information. The mass media is not a good source of information about induction of labor or birth in general.  It is always a good idea to get informed before you decide what is right for you.    If there is not a medical reason to induce your labor, you have 2 choices:    Expectant Management - which means to wait for labor to start on its own.  Most labors will start on their own by 41 weeks. If you decide to wait and labor does not start by 41 weeks - it is suggested to have an ultrasound to check the amniotic fluid level around the baby and to assess fetal well-being.  This is called a BPP (biophysical profile) and a non-stress test (NST).  Induction of Labor - the process of starting labor before natural labor begins (also called “risk reducing induction). This option can be considered as early as 39 weeks (one week before your due date). Based on a research study called “The Arrive Trial”, there was a decreased in hypertensive disorders for the mother, less  intensive care admissions, a slightly higher rate of vaginal birth versus  section.  It also showed a decrease in stillbirth.    * It is important to know that if you are choosing an induction, that your labor will be actively managed (meaning the use of medication and other interventions during labor)    We trust your body to go into spontaneous labor, but also support either decision you make for your birth.      Your cervix  As you wait for the arrival of your baby, your body is hard at work getting ready for labor and birth.  We look at five important factors when assessing your cervix to check its readiness for labor or to make a decision about the method of induction.  This is called a Funez Score.      Dilation (how dilated)  Effacement (how thin)  Station (where baby's head  is)  Consistency (how soft)  Position of cervix    This score can help us to determine your body's readiness for labor and is used as a tool to aid the midwife in selecting the best method to start an induction.  Methods of Labor Induction  An induction of labor can include a combination of any of the following methods:  _______________________________________________________________________________________________________________________________  Cervical Ripening   Ripening is the process of softening, thinning (effacing), and opening (dilating) the cervix. Your provider will use either medicine or mechanical techniques, or a combination of both, during this phase.     Medicine:  The most commonly used medication is Cytotec® (misoprostol). Your provider will insert it as a tablet into your vagina. This can be done every 3 hours until the cervix is about 3-4 cm dilated.    Prostaglandin (hormone) that softens and thins the cervix, may cause contractions  Has been used for approximately 25 years for labor induction  Cannot be used if you have had a prior  or surgery on your uterus  Can sometimes cause excessive uterine contractions (a medication called Terbutaline can be used to slow down the contractions)        Mechanical - Cervical Ripening Balloon:   A cervical ripening balloon is a device that causes the cervix to release natural prostaglandin hormones   A thin, flexible catheter is placed through the cervix using either a speculum or a digital exam  A small balloon at the end of the device is filled with water - this puts gentle, constant pressure on the cervix causing the release nature prostaglandins that help ripen the cervix.  As your cervix is opening, eventually the balloon will fall out; or the balloon can be deflated and removed if still in after 12  hours        _______________________________________________________________________________________________________________________________  Pitocin®  Pitocin® is a medication version of oxytocin - a hormone in your body that causes contractions to begin    Given through an IV (into the vein) line in your arm.   Started at a low dose - rate will be gradually increased every 30 minutes until contractions are occurring about every 2-3 minutes  Contractions help your cervix thin and dilate  Most patients become aware of their contractions about 30 to 60 minutes after starting Pitocin® -  you may feel the contractions as mild, period-like cramping that usually becomes stronger and more frequent as labor progresses     _______________________________________________________________________________________________________________________________  Rupture of Membranes (water breaking)  Rupture of membranes means that the bag containing the amniotic fluid around the baby has broken.     To break your bag of water, your cervix needs to be at least 2-3 cm dilated and baby's head needs to be well applied to your cervix.    Your provider will insert a tiny plastic hook into your vagina during a cervical exam to break your water - you may feel the amniotic fluid running out as the membranes break.   Your contractions are likely to become stronger and closer together, helping your labor progress      _______________________________________________________________________________________________________________________________    What will my induction look like?  If you desire an induction of labor for risk reduction, an appointment will be scheduled on the labor and delivery unit (L&D) for a date and time as early as 39 weeks (1 week prior to your due date).    Date and time of induction is subject to change or may be postponed depending on how busy L&D is or availability of nurse staffing.   Please be sure to eat a healthy  meal before you come in for your labor induction unless your care team tells you not to.        First labor and/or cervix = 1 cm or less       Prior vaginal delivery and/or first labor cervix = 2-3 cm          You and your baby will be closely monitored throughout all phases of labor. You are encouraged to change positions and move around out of the bed. Cervical exam frequency will depend on your individual labor progress. Progress means your cervix continues to dilate and efface and/or the baby is moving lower into your pelvis. The duration of labor depends on how long it takes your cervix to thin and open, and when contractions begin.     If you and your baby are doing well,          the goal is to be patient and make every effort to have a vaginal birth.    If your cervix is dilated less than 6 centimeters, the goal is to allow at least 12 to 18 hours after rupture of membranes and being on Pitocin® to progress into active labor before we consider discussing a . If labor does not progress at this stage, it is called “failed induction of labor.”      If your cervix is dilated 6 centimeters or more, the goal is to have strong, effective contractions and for labor to progress at least every 4 to 6 hours before we consider discussing a . If there are health and safety concerns about you or your baby, your care team may recommend a . If labor progress has stalled at this stage, it is called “arrest of active labor”.     If you have any questions at any time about your labor, please ask. A safe delivery is a team effort and you are a valuable member of the team. Our goal is to include you in decisions about your care.

## 2024-07-04 ENCOUNTER — APPOINTMENT (OUTPATIENT)
Dept: OBSTETRICS AND GYNECOLOGY | Facility: HOSPITAL | Age: 34
End: 2024-07-04
Payer: COMMERCIAL

## 2024-07-04 ENCOUNTER — ANESTHESIA EVENT (OUTPATIENT)
Dept: OBSTETRICS AND GYNECOLOGY | Facility: HOSPITAL | Age: 34
End: 2024-07-04
Payer: COMMERCIAL

## 2024-07-04 ENCOUNTER — HOSPITAL ENCOUNTER (INPATIENT)
Facility: HOSPITAL | Age: 34
LOS: 3 days | Discharge: HOME | End: 2024-07-07
Attending: OBSTETRICS & GYNECOLOGY | Admitting: ADVANCED PRACTICE MIDWIFE
Payer: COMMERCIAL

## 2024-07-04 ENCOUNTER — ANESTHESIA (OUTPATIENT)
Dept: OBSTETRICS AND GYNECOLOGY | Facility: HOSPITAL | Age: 34
End: 2024-07-04
Payer: COMMERCIAL

## 2024-07-04 DIAGNOSIS — Z34.90 ENCOUNTER FOR INDUCTION OF LABOR (HHS-HCC): Primary | ICD-10-CM

## 2024-07-04 DIAGNOSIS — O24.419 GESTATIONAL DIABETES MELLITUS (GDM) IN THIRD TRIMESTER, GESTATIONAL DIABETES METHOD OF CONTROL UNSPECIFIED (HHS-HCC): ICD-10-CM

## 2024-07-04 PROBLEM — L70.0 ACNE VULGARIS: Status: RESOLVED | Noted: 2021-07-06 | Resolved: 2024-07-04

## 2024-07-04 PROBLEM — O24.410 GDM, CLASS A1 (HHS-HCC): Status: ACTIVE | Noted: 2024-07-04

## 2024-07-04 PROBLEM — K21.9 GASTROESOPHAGEAL REFLUX DISEASE: Status: ACTIVE | Noted: 2024-07-04

## 2024-07-04 PROBLEM — R06.09 DYSPNEA ON EXERTION: Status: ACTIVE | Noted: 2024-07-04

## 2024-07-04 LAB
ABO GROUP (TYPE) IN BLOOD: NORMAL
ANTIBODY SCREEN: NORMAL
ERYTHROCYTE [DISTWIDTH] IN BLOOD BY AUTOMATED COUNT: 14.9 % (ref 11.5–14.5)
GLUCOSE BLD MANUAL STRIP-MCNC: 125 MG/DL (ref 74–99)
GLUCOSE BLD MANUAL STRIP-MCNC: 144 MG/DL (ref 74–99)
HCT VFR BLD AUTO: 33.9 % (ref 36–46)
HGB BLD-MCNC: 11.3 G/DL (ref 12–16)
MCH RBC QN AUTO: 29.6 PG (ref 26–34)
MCHC RBC AUTO-ENTMCNC: 33.3 G/DL (ref 32–36)
MCV RBC AUTO: 89 FL (ref 80–100)
NRBC BLD-RTO: 0 /100 WBCS (ref 0–0)
PLATELET # BLD AUTO: 187 X10*3/UL (ref 150–450)
RBC # BLD AUTO: 3.82 X10*6/UL (ref 4–5.2)
RH FACTOR (ANTIGEN D): NORMAL
TREPONEMA PALLIDUM IGG+IGM AB [PRESENCE] IN SERUM OR PLASMA BY IMMUNOASSAY: NONREACTIVE
WBC # BLD AUTO: 6.4 X10*3/UL (ref 4.4–11.3)

## 2024-07-04 PROCEDURE — 2500000001 HC RX 250 WO HCPCS SELF ADMINISTERED DRUGS (ALT 637 FOR MEDICARE OP): Performed by: ADVANCED PRACTICE MIDWIFE

## 2024-07-04 PROCEDURE — 2500000004 HC RX 250 GENERAL PHARMACY W/ HCPCS (ALT 636 FOR OP/ED): Performed by: ADVANCED PRACTICE MIDWIFE

## 2024-07-04 PROCEDURE — 86850 RBC ANTIBODY SCREEN: CPT | Performed by: ADVANCED PRACTICE MIDWIFE

## 2024-07-04 PROCEDURE — 85027 COMPLETE CBC AUTOMATED: CPT | Performed by: ADVANCED PRACTICE MIDWIFE

## 2024-07-04 PROCEDURE — 99222 1ST HOSP IP/OBS MODERATE 55: CPT | Performed by: ADVANCED PRACTICE MIDWIFE

## 2024-07-04 PROCEDURE — 2500000005 HC RX 250 GENERAL PHARMACY W/O HCPCS

## 2024-07-04 PROCEDURE — 3700000014 EPIDURAL BLOCK: Mod: GC

## 2024-07-04 PROCEDURE — 82947 ASSAY GLUCOSE BLOOD QUANT: CPT

## 2024-07-04 PROCEDURE — 36415 COLL VENOUS BLD VENIPUNCTURE: CPT | Performed by: ADVANCED PRACTICE MIDWIFE

## 2024-07-04 PROCEDURE — 86780 TREPONEMA PALLIDUM: CPT | Performed by: ADVANCED PRACTICE MIDWIFE

## 2024-07-04 PROCEDURE — 7210000002 HC LABOR PER HOUR

## 2024-07-04 PROCEDURE — 3E0P7VZ INTRODUCTION OF HORMONE INTO FEMALE REPRODUCTIVE, VIA NATURAL OR ARTIFICIAL OPENING: ICD-10-PCS | Performed by: ADVANCED PRACTICE MIDWIFE

## 2024-07-04 PROCEDURE — 59050 FETAL MONITOR W/REPORT: CPT

## 2024-07-04 PROCEDURE — 1120000001 HC OB PRIVATE ROOM DAILY

## 2024-07-04 RX ORDER — FENTANYL/BUPIVACAINE/NS/PF 2MCG/ML-.1
PLASTIC BAG, INJECTION (ML) INJECTION CONTINUOUS PRN
Status: DISCONTINUED | OUTPATIENT
Start: 2024-07-04 | End: 2024-07-06

## 2024-07-04 RX ORDER — METOCLOPRAMIDE HYDROCHLORIDE 5 MG/ML
10 INJECTION INTRAMUSCULAR; INTRAVENOUS EVERY 6 HOURS PRN
Status: DISCONTINUED | OUTPATIENT
Start: 2024-07-04 | End: 2024-07-06

## 2024-07-04 RX ORDER — ONDANSETRON 4 MG/1
4 TABLET, FILM COATED ORAL EVERY 6 HOURS PRN
Status: DISCONTINUED | OUTPATIENT
Start: 2024-07-04 | End: 2024-07-06

## 2024-07-04 RX ORDER — NIFEDIPINE 10 MG/1
10 CAPSULE ORAL ONCE AS NEEDED
Status: DISCONTINUED | OUTPATIENT
Start: 2024-07-04 | End: 2024-07-06 | Stop reason: HOSPADM

## 2024-07-04 RX ORDER — FENTANYL/BUPIVACAINE/NS/PF 2MCG/ML-.1
PLASTIC BAG, INJECTION (ML) INJECTION AS NEEDED
Status: DISCONTINUED | OUTPATIENT
Start: 2024-07-04 | End: 2024-07-06

## 2024-07-04 RX ORDER — MISOPROSTOL 200 UG/1
800 TABLET ORAL ONCE AS NEEDED
Status: DISCONTINUED | OUTPATIENT
Start: 2024-07-04 | End: 2024-07-06 | Stop reason: HOSPADM

## 2024-07-04 RX ORDER — DEXTROSE 40 %
15 GEL (GRAM) ORAL
Status: DISCONTINUED | OUTPATIENT
Start: 2024-07-04 | End: 2024-07-06

## 2024-07-04 RX ORDER — LABETALOL HYDROCHLORIDE 5 MG/ML
20 INJECTION, SOLUTION INTRAVENOUS ONCE AS NEEDED
Status: DISCONTINUED | OUTPATIENT
Start: 2024-07-04 | End: 2024-07-06 | Stop reason: HOSPADM

## 2024-07-04 RX ORDER — ONDANSETRON HYDROCHLORIDE 2 MG/ML
4 INJECTION, SOLUTION INTRAVENOUS EVERY 6 HOURS PRN
Status: DISCONTINUED | OUTPATIENT
Start: 2024-07-04 | End: 2024-07-06

## 2024-07-04 RX ORDER — LOPERAMIDE HYDROCHLORIDE 2 MG/1
4 CAPSULE ORAL EVERY 2 HOUR PRN
Status: DISCONTINUED | OUTPATIENT
Start: 2024-07-04 | End: 2024-07-06 | Stop reason: HOSPADM

## 2024-07-04 RX ORDER — METHYLERGONOVINE MALEATE 0.2 MG/ML
0.2 INJECTION INTRAVENOUS ONCE AS NEEDED
Status: DISCONTINUED | OUTPATIENT
Start: 2024-07-04 | End: 2024-07-06 | Stop reason: HOSPADM

## 2024-07-04 RX ORDER — DEXTROSE 50 % IN WATER (D50W) INTRAVENOUS SYRINGE
25
Status: DISCONTINUED | OUTPATIENT
Start: 2024-07-04 | End: 2024-07-06

## 2024-07-04 RX ORDER — HYDRALAZINE HYDROCHLORIDE 20 MG/ML
5 INJECTION INTRAMUSCULAR; INTRAVENOUS ONCE AS NEEDED
Status: DISCONTINUED | OUTPATIENT
Start: 2024-07-04 | End: 2024-07-06 | Stop reason: HOSPADM

## 2024-07-04 RX ORDER — OXYTOCIN/0.9 % SODIUM CHLORIDE 30/500 ML
60 PLASTIC BAG, INJECTION (ML) INTRAVENOUS ONCE AS NEEDED
Status: DISCONTINUED | OUTPATIENT
Start: 2024-07-04 | End: 2024-07-06 | Stop reason: HOSPADM

## 2024-07-04 RX ORDER — TRANEXAMIC ACID 100 MG/ML
1000 INJECTION, SOLUTION INTRAVENOUS ONCE AS NEEDED
Status: DISCONTINUED | OUTPATIENT
Start: 2024-07-04 | End: 2024-07-06 | Stop reason: HOSPADM

## 2024-07-04 RX ORDER — OXYTOCIN 10 [USP'U]/ML
10 INJECTION, SOLUTION INTRAMUSCULAR; INTRAVENOUS ONCE AS NEEDED
Status: DISCONTINUED | OUTPATIENT
Start: 2024-07-04 | End: 2024-07-06 | Stop reason: HOSPADM

## 2024-07-04 RX ORDER — SODIUM CHLORIDE, SODIUM LACTATE, POTASSIUM CHLORIDE, CALCIUM CHLORIDE 600; 310; 30; 20 MG/100ML; MG/100ML; MG/100ML; MG/100ML
75 INJECTION, SOLUTION INTRAVENOUS CONTINUOUS
Status: DISCONTINUED | OUTPATIENT
Start: 2024-07-04 | End: 2024-07-06

## 2024-07-04 RX ORDER — TERBUTALINE SULFATE 1 MG/ML
0.25 INJECTION SUBCUTANEOUS ONCE AS NEEDED
Status: DISCONTINUED | OUTPATIENT
Start: 2024-07-04 | End: 2024-07-06 | Stop reason: HOSPADM

## 2024-07-04 RX ORDER — CARBOPROST TROMETHAMINE 250 UG/ML
250 INJECTION, SOLUTION INTRAMUSCULAR ONCE AS NEEDED
Status: DISCONTINUED | OUTPATIENT
Start: 2024-07-04 | End: 2024-07-06 | Stop reason: HOSPADM

## 2024-07-04 RX ORDER — LIDOCAINE HYDROCHLORIDE 10 MG/ML
30 INJECTION INFILTRATION; PERINEURAL ONCE AS NEEDED
Status: DISCONTINUED | OUTPATIENT
Start: 2024-07-04 | End: 2024-07-06 | Stop reason: HOSPADM

## 2024-07-04 RX ORDER — METOCLOPRAMIDE 10 MG/1
10 TABLET ORAL EVERY 6 HOURS PRN
Status: DISCONTINUED | OUTPATIENT
Start: 2024-07-04 | End: 2024-07-06

## 2024-07-04 RX ORDER — OXYTOCIN/0.9 % SODIUM CHLORIDE 30/500 ML
2-30 PLASTIC BAG, INJECTION (ML) INTRAVENOUS CONTINUOUS
Status: DISCONTINUED | OUTPATIENT
Start: 2024-07-04 | End: 2024-07-05

## 2024-07-04 RX ORDER — DEXTROSE 40 %
30 GEL (GRAM) ORAL
Status: DISCONTINUED | OUTPATIENT
Start: 2024-07-04 | End: 2024-07-06

## 2024-07-04 SDOH — HEALTH STABILITY: MENTAL HEALTH: WERE YOU ABLE TO COMPLETE ALL THE BEHAVIORAL HEALTH SCREENINGS?: YES

## 2024-07-04 SDOH — HEALTH STABILITY: MENTAL HEALTH: HAVE YOU USED ANY SUBSTANCES (CANABIS, COCAINE, HEROIN, HALLUCINOGENS, INHALANTS, ETC.) IN THE PAST 12 MONTHS?: NO

## 2024-07-04 SDOH — SOCIAL STABILITY: SOCIAL INSECURITY: HAS ANYONE EVER THREATENED TO HURT YOUR FAMILY OR YOUR PETS?: NO

## 2024-07-04 SDOH — SOCIAL STABILITY: SOCIAL INSECURITY: HAVE YOU HAD THOUGHTS OF HARMING ANYONE ELSE?: NO

## 2024-07-04 SDOH — SOCIAL STABILITY: SOCIAL INSECURITY: VERBAL ABUSE: DENIES

## 2024-07-04 SDOH — HEALTH STABILITY: MENTAL HEALTH: NON-SPECIFIC ACTIVE SUICIDAL THOUGHTS (PAST 1 MONTH): NO

## 2024-07-04 SDOH — SOCIAL STABILITY: SOCIAL INSECURITY: DOES ANYONE TRY TO KEEP YOU FROM HAVING/CONTACTING OTHER FRIENDS OR DOING THINGS OUTSIDE YOUR HOME?: NO

## 2024-07-04 SDOH — HEALTH STABILITY: MENTAL HEALTH: SUICIDAL BEHAVIOR (LIFETIME): NO

## 2024-07-04 SDOH — ECONOMIC STABILITY: HOUSING INSECURITY: DO YOU FEEL UNSAFE GOING BACK TO THE PLACE WHERE YOU ARE LIVING?: NO

## 2024-07-04 SDOH — HEALTH STABILITY: MENTAL HEALTH: HAVE YOU USED ANY PRESCRIPTION DRUGS OTHER THAN PRESCRIBED IN THE PAST 12 MONTHS?: NO

## 2024-07-04 SDOH — SOCIAL STABILITY: SOCIAL INSECURITY: ARE THERE ANY APPARENT SIGNS OF INJURIES/BEHAVIORS THAT COULD BE RELATED TO ABUSE/NEGLECT?: NO

## 2024-07-04 SDOH — SOCIAL STABILITY: SOCIAL INSECURITY: PHYSICAL ABUSE: DENIES

## 2024-07-04 SDOH — SOCIAL STABILITY: SOCIAL INSECURITY: DO YOU FEEL ANYONE HAS EXPLOITED OR TAKEN ADVANTAGE OF YOU FINANCIALLY OR OF YOUR PERSONAL PROPERTY?: NO

## 2024-07-04 SDOH — SOCIAL STABILITY: SOCIAL INSECURITY: HAVE YOU HAD ANY THOUGHTS OF HARMING ANYONE ELSE?: NO

## 2024-07-04 SDOH — SOCIAL STABILITY: SOCIAL INSECURITY: ABUSE SCREEN: ADULT

## 2024-07-04 SDOH — SOCIAL STABILITY: SOCIAL INSECURITY: ARE YOU OR HAVE YOU BEEN THREATENED OR ABUSED PHYSICALLY, EMOTIONALLY, OR SEXUALLY BY ANYONE?: NO

## 2024-07-04 ASSESSMENT — LIFESTYLE VARIABLES
HOW MANY STANDARD DRINKS CONTAINING ALCOHOL DO YOU HAVE ON A TYPICAL DAY: PATIENT DOES NOT DRINK
HOW OFTEN DO YOU HAVE 6 OR MORE DRINKS ON ONE OCCASION: NEVER
AUDIT-C TOTAL SCORE: 0
HOW OFTEN DO YOU HAVE A DRINK CONTAINING ALCOHOL: NEVER
SKIP TO QUESTIONS 9-10: 1
AUDIT-C TOTAL SCORE: 0

## 2024-07-04 ASSESSMENT — PAIN SCALES - GENERAL
PAINLEVEL_OUTOF10: 0 - NO PAIN

## 2024-07-04 ASSESSMENT — PATIENT HEALTH QUESTIONNAIRE - PHQ9
2. FEELING DOWN, DEPRESSED OR HOPELESS: NOT AT ALL
SUM OF ALL RESPONSES TO PHQ9 QUESTIONS 1 & 2: 0
1. LITTLE INTEREST OR PLEASURE IN DOING THINGS: NOT AT ALL

## 2024-07-04 NOTE — PROGRESS NOTES
Subjective:  Rex Murphy is sitting on birthing ball, feeling contractions, reports they are getting stronger but still tolerable.     Objective:  Fetal Monitoring      Baseline FHR: 150 per minute  Variability: moderate  Accelerations: yes  Decelerations: variable  TOCO: regular, every 1.5-3 minutes    Cervical Exam:  3/60/-3    Membrane status: intact    Vitals:    24 1054 24 1429 24 1430 24 1431   BP:   90/54 (!) 89/55   Pulse:  86 86 86   Resp:    16   Temp: 36.8 °C (98.2 °F)   36.7 °C (98.1 °F)   TempSrc: Oral   Oral   SpO2:  97%     Weight:       Height:          Assessment    34 y.o.  at 39w0d  FHT Category 2 - reassuring with moderate variability and accels   Latent labor  GBS negative  GDMA1    Plan    CEFM  Encourage frequent position changes as tolerated  Continue PP BG checks while on diabetic diet   Start pitocin per protocol if contractions space  Consider AROM with next cervical exam if appropriate   Pain management per patient request - planning epidural   Continue assessment of maternal and fetal wellbeing  Recheck as clinically indicated by maternal or fetal status  Patient status and plan of care reviewed with Dr. Perdue  Anticipate NSVB    Dianelys Magaña, AARON-RONNIEM, APRVERNELL-CNP

## 2024-07-04 NOTE — ANESTHESIA PREPROCEDURE EVALUATION
"Patient: Rex Murphy    Evaluation Method: In-person visit    Procedure Information    Date: 07/04/24  Procedure: Labor Analgesia         Relevant Problems   Anesthesia  MAC unremarkable; denies family hx of anesthesia complications       Cardiac  Reports SBP typically in the 90s. Reports syncope with hypotension in the past   (-) Chest pain      Pulmonary   (+) Dyspnea on exertion      GI   (+) Gastroesophageal reflux disease      Endocrine   (+) GDM, class A1 ([facility])   (+) Gestational diabetes mellitus (GDM) in third trimester ([facility])      Hematology   (+) Anemia affecting pregnancy in third trimester ([facility])      Musculoskeletal   (-) Chronic low back pain   (-) Chronic neck pain      GYN   (+) 34 weeks gestation of pregnancy ([facility])       Clinical information reviewed:    Allergies  Meds  Problems              NPO Detail:  No data recorded     OB/Gyn Evaluation    Present Pregnancy    Patient is pregnant now.   Obstetric History                Physical Exam    Airway  Mallampati: III  TM distance: >3 FB     Cardiovascular   Rhythm: regular     Dental    Pulmonary   Breath sounds clear to auscultation     Abdominal          Vitals:    07/04/24 1431   BP: (!) 89/55   Pulse: 86   Resp: 16   Temp: 36.7 °C (98.1 °F)   SpO2:        Chemistry    Lab Results   Component Value Date/Time     09/02/2021 0859    K 3.9 09/02/2021 0859     09/02/2021 0859    CO2 25 09/02/2021 0859    BUN 14 09/02/2021 0859    CREATININE 0.56 09/02/2021 0859    Lab Results   Component Value Date/Time    CALCIUM 9.3 09/02/2021 0859    ALKPHOS 46 09/02/2021 0859    AST 15 09/02/2021 0859    ALT 11 09/02/2021 0859    BILITOT 0.4 09/02/2021 0859          Lab Results   Component Value Date/Time    WBC 6.4 07/04/2024 1152    HGB 11.3 (L) 07/04/2024 1152    HCT 33.9 (L) 07/04/2024 1152     07/04/2024 1152     No results found for: \"PROTIME\", \"PTT\", \"INR\"  Encounter Date: 04/22/24   ECG 12 lead (Ancillary Performed) "   Result Value    Ventricular Rate 96    Atrial Rate 96    IN Interval 146    QRS Duration 82    QT Interval 342    QTC Calculation(Bazett) 432    P Axis 43    R Axis 37    T Axis 44    QRS Count 15    Q Onset 223    P Onset 150    P Offset 195    T Offset 394    QTC Fredericia 400    Narrative    Normal sinus rhythm  Normal ECG  When compared with ECG of 30-APR-2016 20:00,  No significant change was found  Confirmed by Jacob Contreras ([id number]) on 4/22/2024 10:13:31 AM     No results found for this or any previous visit from the past 1095 days.    Anesthesia Plan    History of general anesthesia?: no  History of complications of general anesthesia?: unknown/emergency    ASA 2     epidural     Anesthetic plan and risks discussed with patient and spouse.  Use of blood products discussed with patient and spouse who consented to blood products.    Plan discussed with attending and resident.

## 2024-07-04 NOTE — H&P
Obstetrical Admission History and Physical     Rex Murphy is a 34 y.o.  at 39w0d. VLADIMIR: 2024, by Last Menstrual Period. Estimated fetal weight: 7lbs. She has had prenatal care with Lisset Shukla CNM .    Chief Complaint: IOL    Assessment/Plan    33 yo  @ 39 wks by LMP c/w 8/5 wk US   GDMA1  GBS negative   Unfavorable cervix  Category I Tracing     P:  Admit to L&D with routine orders  Discussed methods of IOL including CRB and cytotec. Patient desires to avoid CRB if possible. Cyto#1 placed @ 1215. Discussed pitocin per protocol and AROM when appropriate   CEFM   Pain management per patient request - planning epidural   Diabetic diet, 1 hr postprandial BG checks   Continue to monitor maternal and fetal status   Dr. Perdue aware of patient admission and plan   Anticipate SVB     DELICIA Gutierrez        Active Problems:  There are no active Hospital Problems.      Pregnancy Problems (from 23 to present)       Problem Noted Resolved    34 weeks gestation of pregnancy ([facility]) 2024 by Tanvi Silva MD No    Priority:  Medium      Decreased fetal movements in third trimester ([facility]) 2024 by DELICIA Gutierrez, APRN-CNP No    Priority:  Medium      Primigravida, third trimester ([facility]) 2024 by DELICIA Gutierrez, APRN-CNP No    Priority:  Medium      Anemia affecting pregnancy in third trimester ([facility]) 2024 by DELICIA Gooden No    Priority:  Medium      Overview Addendum 2024 12:57 PM by DELICIA Collazo, APRN-CNP      10.0 Hgb Ferritin 26, started on PO fe BID   hgb = 11.2         Gestational diabetes mellitus (GDM) in third trimester ([facility]) 2024 by DELICIA Gooden No    Priority:  Medium      Overview Addendum 2024  3:06 PM by ALEKS Bunch     1hr 154  3hr abnormal     MFM appt on 5/9  Currently well-controlled with diet changes  2024 : nutrition  2024  Nutrition plan alone   5/21/2024 Nutrition plan alone   5/28/2024 Nutrition plan alone   6/4/2024 Nutrition plan   6/11/2024 No change - Nutrition plan     6/18/2024 Nutrition plan    6/25/2024  Nutrition plan               Uterine size-date discrepancy in third trimester ([facility] 2/20/2024 by DELICIA Gooden No    Priority:  Medium      Overview Addendum 5/3/2024  4:09 PM by Nevaeh Hardy MD     AC 23rd%tile EFW 10th%tile   Resolved!     4/26  EFW 31%tile Ac16th%tile         Family history of neural tube defect 12/7/2023 by DELICIA Gooden No    Priority:  Medium      Overview Addendum 1/4/2024 12:26 PM by DELICIA Gooden     Unknown specifically FOB had spine surgery at day 1 of life in [place]  Genetics consult placed and done see note               Options for delivery have been discussed with the patient and she elects for an induction of labor.  Cervical ripening with cytotec, cervidil, other prostaglandin agents has been discussed.  Induction of labor with pitocin, amniotomy, cytotec, and cervical balloon have been discussed in detail. The risks, benefits, complications, alternatives, expected outcomes, potential problems during recuperation and recovery, and the risks of not performing the procedure were discussed with the patient. The patient stated understanding that the risks of delivery include, but are not limited to: death; reaction to medications; injury to bowel, bladder, ureters, uterus, cervix, vagina, and other pelvic and abdominal structures, infection; blood loss and possible need for transfusion; and potential need for surgery, including hysterectomy. The risks of injury to the infant during delivery were also discussed. All questions were answered. There was concurrence with the planned procedure, and the patient wanted to proceed.    Admit to inpatient status. I anticipate that this patient will require a stay exceeding at least 2 midnights for  delivery and postpartum.  Induction of labor.  Management of pregnancy complications, as indicated.    Subjective   Good fetal movement. Denies vaginal bleeding., Denies contractions., Denies leaking of fluid.       Reason for Induction of Labor:  Gestational diabetes       Obstetrical History   OB History    Para Term  AB Living   1             SAB IAB Ectopic Multiple Live Births                  # Outcome Date GA Lbr [place]/2nd Weight Sex Delivery Anes PTL Lv   1 Current                Past Medical History  Past Medical History:   Diagnosis Date    Syncope and collapse 2021    Near syncope        Past Surgical History   Past Surgical History:   Procedure Laterality Date    OTHER SURGICAL HISTORY  2021    No history of surgery       Social History  Social History     Tobacco Use    Smoking status: Never     Passive exposure: Never    Smokeless tobacco: Never   Substance Use Topics    Alcohol use: Never     Substance and Sexual Activity   Drug Use Never       Allergies  Amoxicillin     Medications  Medications Prior to Admission   Medication Sig Dispense Refill Last Dose    blood sugar diagnostic (Accu-Chek Guide test strips) strip Use 1 strip 4-6 times per day to test blood sugar during pregnancy 150 each 3 2024    blood-glucose meter (Accu-Chek Guide Glucose Meter) misc Use for testing blood sugar during pregnancy 1 each 0 2024    ergocalciferol (Vitamin D-2) 1.25 MG (80404 UT) capsule Take 1 capsule (1,250 mcg) by mouth once a week.   7/3/2024    ferrous sulfate 325 (65 Fe) MG EC tablet Take 1 tablet by mouth 2 times a day. Take 1 tablet twice daily every other day- with orange juice or other high citrus beverage or food 60 tablet 11 7/3/2024    lancets (Accu-Chek Softclix Lancets) misc Use 1 lancet 4-6 times per day during pregnancy 200 each 3 2024    prenatal vitamin, iron-folic, 27 mg iron-800 mcg folic acid tablet Take 1 tablet by mouth once daily. 90 tablet 3 7/3/2024     famotidine (Pepcid) 20 mg tablet Take 1 tablet (20 mg) by mouth once daily. 30 tablet 1 Unknown       Objective    Last Vitals  Temp Pulse Resp BP MAP O2 Sat   36.8 °C (98.2 °F) 88 16     97 %     Physical Examination  GENERAL: Examination reveals a well developed, well nourished, gravid female in no acute distress. She is alert and cooperative.  HEENT: External ears normal. Nose normal, no erythema or discharge. Mouth and throat clear.  LUNGS:  no increased work of breathing   ABDOMEN: soft, gravid, nontender, nondistended, no abnormal masses, no epigastric pain  FHR is 145 , with Accelerations, no decelerations, and a Category I tracing.    Klemme reading:  irregular   The fetus is in a vertex presentation, determined by ultrasound and Leopold's maneuver  Current Estimated Fetal Weight 7lbs established by Leopold's maneuver  VAGINA: normal appearing vagina with normal color and discharge and no lesions noted  CERVIX: 1.5 cm dilated, 50 % effaced, -3 station; MEMBRANES are Intact  EXTREMITIES: no redness or tenderness in the calves or thighs, no edema  SKIN: normal coloration and turgor, no rashes  NEUROLOGICAL: alert, oriented, normal speech, no focal findings or movement disorder noted  PSYCHOLOGICAL: awake and alert; oriented to person, place, and time    Lab Review  Labs in chart were reviewed.

## 2024-07-04 NOTE — PROGRESS NOTES
Obstetrical Admission History and Physical     Rex Murphy is a 34 y.o.  at 39w0d. VLADIMIR: 2024, by Last Menstrual Period. Estimated fetal weight: 7#. She has had prenatal care with Nieves Ortiz  .    Assessment    Rex Murphy is a 34 y.o.  at 39w0d. VLADIMIR: 2024, by Last Menstrual Period.   FHT Category 1  Unfavorable cervix   GBS negative   GDMA1    Plan    Admit to labor and delivery  Monitor vital signs per unit protocol  Routine labs ordered   Encourage frequent position changes  Diabetic diet, clear liquid diet with epidural  1 hr postprandial BG checks   Continuous fetal monitoring  Begin IOL with cytotec, discussed CRB placement, patient defers at this time. Will plan for pitocin and AROM when appropriate    Pain management per patient request - planning epidural   Continue assessment of maternal and fetal well-being  Recheck as clinically indicted by maternal or fetal status  Anticipate SVB  Dr. Perdue and Dr. Santos  aware of admission    DELICIA Gutierrez, APRVERNELL-CNP    Subjective     Chief Complaint: No chief complaint on file.    Rex is here for a schedule term IOL in the setting of GDMA1.     Feeling some irregular contractions, denies vaginal bleeding, leaking fluid.   Active fetal movement   Pregnancy Problems (from 23 to present)       Problem Noted Resolved    GDM, class A1 ([facility]) 2024 by DELICIA Gutierrez, APRN-CNP No    Priority:  Medium      34 weeks gestation of pregnancy ([facility]) 2024 by Tanvi Silva MD No    Priority:  Medium      Decreased fetal movements in third trimester ([facility]) 2024 by DELICIA Gutierrez, APRN-CNP No    Priority:  Medium      Primigravida, third trimester ([facility]) 2024 by DELICIA Gutierrez, APRN-CNP No    Priority:  Medium      Anemia affecting pregnancy in third trimester ([facility]) 2024 by DELICIA Gooden No    Priority:  Medium      Overview Addendum 2024 12:57 PM  by DELICIA Collazo, AARON-CNP      10.0 Hgb Ferritin 26, started on PO fe BID   hgb = 11.2         Gestational diabetes mellitus (GDM) in third trimester ([facility]) 2024 by DELICIA Gooden No    Priority:  Medium      Overview Addendum 2024  3:06 PM by ALEKS Bunch     1hr 154  3hr abnormal     MFM appt on   Currently well-controlled with diet changes  2024 : nutrition  2024 Nutrition plan alone   2024 Nutrition plan alone   2024 Nutrition plan alone   2024 Nutrition plan   2024 No change - Nutrition plan     2024 Nutrition plan    2024  Nutrition plan               Uterine size-date discrepancy in third trimester ([facility]) 2024 by DELICIA Gooden No    Priority:  Medium      Overview Addendum 5/3/2024  4:09 PM by Nevaeh Hardy MD     AC 23rd%tile EFW 10th%tile   Resolved!       EFW 31%tile Ac16th%tile         Family history of neural tube defect 2023 by DELICIA Gooden No    Priority:  Medium      Overview Addendum 2024 12:26 PM by DELICIA Gooden     Unknown specifically FOB had spine surgery at day 1 of life in [place]  Genetics consult placed and done see note                  Obstetrical History   OB History    Para Term  AB Living   1             SAB IAB Ectopic Multiple Live Births                  # Outcome Date GA Lbr Kayden/2nd Weight Sex Delivery Anes PTL Lv   1 Current                Past Medical History  Past Medical History:   Diagnosis Date    Syncope and collapse 2021    Near syncope        Past Surgical History   Past Surgical History:   Procedure Laterality Date    OTHER SURGICAL HISTORY  2021    No history of surgery       Social History  Social History     Tobacco Use    Smoking status: Never     Passive exposure: Never    Smokeless tobacco: Never   Substance Use Topics    Alcohol use: Never     Substance and  Sexual Activity   Drug Use Never       Allergies  Amoxicillin     Medications  Medications Prior to Admission   Medication Sig Dispense Refill Last Dose    blood sugar diagnostic (Accu-Chek Guide test strips) strip Use 1 strip 4-6 times per day to test blood sugar during pregnancy 150 each 3 7/4/2024    blood-glucose meter (Accu-Chek Guide Glucose Meter) misc Use for testing blood sugar during pregnancy 1 each 0 7/4/2024    ergocalciferol (Vitamin D-2) 1.25 MG (71098 UT) capsule Take 1 capsule (1,250 mcg) by mouth once a week.   7/3/2024    ferrous sulfate 325 (65 Fe) MG EC tablet Take 1 tablet by mouth 2 times a day. Take 1 tablet twice daily every other day- with orange juice or other high citrus beverage or food 60 tablet 11 7/3/2024    lancets (Accu-Chek Softclix Lancets) misc Use 1 lancet 4-6 times per day during pregnancy 200 each 3 7/4/2024    prenatal vitamin, iron-folic, 27 mg iron-800 mcg folic acid tablet Take 1 tablet by mouth once daily. 90 tablet 3 7/3/2024    famotidine (Pepcid) 20 mg tablet Take 1 tablet (20 mg) by mouth once daily. 30 tablet 1 Unknown       Objective    Last Vitals  Temp Pulse Resp BP MAP O2 Sat   36.8 °C (98.2 °F) 88 16     97 %     Physical Examination    GENERAL: Examination reveals a well developed, well nourished, gravid female in no acute distress. She is alert and cooperative.  HEENT: External ears normal. Nose normal, no erythema or discharge. Mouth and throat clear.  LUNGS: clear to auscultation bilaterally  ABDOMEN: soft, gravid, nontender, nondistended, no abnormal masses, no epigastric pain  FHR is  , with Accelerations, and a Category I tracing.    [place] reading:    The fetus is in a vertex presentation, determined by ultrasound and Leopold's maneuver  Current Estimated Fetal Weight 7#6 established by Leopold's maneuver  VAGINA: normal appearing vagina with normal color and discharge and no lesions noted  CERVIX: 1 cm dilated, 50 % effaced, -3 station; MEMBRANES are  Intact  EXTREMITIES: no redness or tenderness in the calves or thighs, no edema  SKIN: normal coloration and turgor, no rashes  NEUROLOGICAL: alert, oriented, normal speech, no focal findings or movement disorder noted  PSYCHOLOGICAL: awake and alert; oriented to person, place, and time    Fetal Monitoring      Baseline FHR: 145  per minute  Variability: moderate  Accelerations: yes  Decelerations: none  TOCO: irregular, every 7-10 minutes    Cervical Exam:  1.5/50/-3    Membrane status: intact      Lab Review  Labs in chart were reviewed.

## 2024-07-05 PROBLEM — O26.843 UTERINE SIZE-DATE DISCREPANCY IN THIRD TRIMESTER (HHS-HCC): Status: RESOLVED | Noted: 2024-02-20 | Resolved: 2024-07-05

## 2024-07-05 PROBLEM — Z3A.39 39 WEEKS GESTATION OF PREGNANCY (HHS-HCC): Status: ACTIVE | Noted: 2024-05-31

## 2024-07-05 PROBLEM — Z34.90 ENCOUNTER FOR INDUCTION OF LABOR (HHS-HCC): Status: ACTIVE | Noted: 2024-05-16

## 2024-07-05 PROBLEM — O36.8130 DECREASED FETAL MOVEMENTS IN THIRD TRIMESTER (HHS-HCC): Status: RESOLVED | Noted: 2024-05-17 | Resolved: 2024-07-05

## 2024-07-05 LAB
GLUCOSE BLD MANUAL STRIP-MCNC: 100 MG/DL (ref 74–99)
GLUCOSE BLD MANUAL STRIP-MCNC: 79 MG/DL (ref 74–99)
GLUCOSE BLD MANUAL STRIP-MCNC: 80 MG/DL (ref 74–99)
GLUCOSE BLD MANUAL STRIP-MCNC: 87 MG/DL (ref 74–99)

## 2024-07-05 PROCEDURE — 7210000002 HC LABOR PER HOUR

## 2024-07-05 PROCEDURE — 1120000001 HC OB PRIVATE ROOM DAILY

## 2024-07-05 PROCEDURE — 2500000004 HC RX 250 GENERAL PHARMACY W/ HCPCS (ALT 636 FOR OP/ED)

## 2024-07-05 PROCEDURE — 3E033VJ INTRODUCTION OF OTHER HORMONE INTO PERIPHERAL VEIN, PERCUTANEOUS APPROACH: ICD-10-PCS

## 2024-07-05 PROCEDURE — 2500000004 HC RX 250 GENERAL PHARMACY W/ HCPCS (ALT 636 FOR OP/ED): Performed by: ADVANCED PRACTICE MIDWIFE

## 2024-07-05 PROCEDURE — 82947 ASSAY GLUCOSE BLOOD QUANT: CPT

## 2024-07-05 PROCEDURE — 10907ZC DRAINAGE OF AMNIOTIC FLUID, THERAPEUTIC FROM PRODUCTS OF CONCEPTION, VIA NATURAL OR ARTIFICIAL OPENING: ICD-10-PCS

## 2024-07-05 PROCEDURE — 51701 INSERT BLADDER CATHETER: CPT

## 2024-07-05 PROCEDURE — 10H07YZ INSERTION OF OTHER DEVICE INTO PRODUCTS OF CONCEPTION, VIA NATURAL OR ARTIFICIAL OPENING: ICD-10-PCS | Performed by: NURSE PRACTITIONER

## 2024-07-05 PROCEDURE — 59400 OBSTETRICAL CARE: CPT

## 2024-07-05 PROCEDURE — 2500000005 HC RX 250 GENERAL PHARMACY W/O HCPCS: Performed by: STUDENT IN AN ORGANIZED HEALTH CARE EDUCATION/TRAINING PROGRAM

## 2024-07-05 PROCEDURE — 2500000005 HC RX 250 GENERAL PHARMACY W/O HCPCS

## 2024-07-05 PROCEDURE — 59409 OBSTETRICAL CARE: CPT

## 2024-07-05 RX ORDER — DEXTROSE 40 %
15 GEL (GRAM) ORAL
Status: DISCONTINUED | OUTPATIENT
Start: 2024-07-05 | End: 2024-07-06

## 2024-07-05 RX ORDER — LIDOCAINE HCL/EPINEPHRINE/PF 2%-1:200K
VIAL (ML) INJECTION AS NEEDED
Status: DISCONTINUED | OUTPATIENT
Start: 2024-07-05 | End: 2024-07-06

## 2024-07-05 RX ORDER — OXYTOCIN/0.9 % SODIUM CHLORIDE 30/500 ML
2-30 PLASTIC BAG, INJECTION (ML) INTRAVENOUS CONTINUOUS
Status: DISCONTINUED | OUTPATIENT
Start: 2024-07-05 | End: 2024-07-06

## 2024-07-05 RX ORDER — FENTANYL/BUPIVACAINE/NS/PF 2MCG/ML-.1
0-54 PLASTIC BAG, INJECTION (ML) INJECTION CONTINUOUS
Status: DISCONTINUED | OUTPATIENT
Start: 2024-07-05 | End: 2024-07-06

## 2024-07-05 RX ORDER — LIDOCAINE HYDROCHLORIDE 10 MG/ML
INJECTION INFILTRATION; PERINEURAL AS NEEDED
Status: DISCONTINUED | OUTPATIENT
Start: 2024-07-05 | End: 2024-07-06

## 2024-07-05 RX ORDER — FENTANYL CITRATE 50 UG/ML
INJECTION, SOLUTION INTRAMUSCULAR; INTRAVENOUS AS NEEDED
Status: DISCONTINUED | OUTPATIENT
Start: 2024-07-05 | End: 2024-07-06

## 2024-07-05 RX ORDER — DEXTROSE 50 % IN WATER (D50W) INTRAVENOUS SYRINGE
25
Status: DISCONTINUED | OUTPATIENT
Start: 2024-07-05 | End: 2024-07-06

## 2024-07-05 RX ORDER — DEXTROSE 40 %
30 GEL (GRAM) ORAL
Status: DISCONTINUED | OUTPATIENT
Start: 2024-07-05 | End: 2024-07-06

## 2024-07-05 RX ORDER — INSULIN LISPRO 100 [IU]/ML
0-10 INJECTION, SOLUTION INTRAVENOUS; SUBCUTANEOUS EVERY 4 HOURS
Status: DISCONTINUED | OUTPATIENT
Start: 2024-07-05 | End: 2024-07-06

## 2024-07-05 SDOH — HEALTH STABILITY: MENTAL HEALTH: HAVE YOU USED ANY PRESCRIPTION DRUGS OTHER THAN PRESCRIBED IN THE PAST 12 MONTHS?: NO

## 2024-07-05 SDOH — SOCIAL STABILITY: SOCIAL INSECURITY: ABUSE SCREEN: ADULT

## 2024-07-05 SDOH — SOCIAL STABILITY: SOCIAL INSECURITY: ARE THERE ANY APPARENT SIGNS OF INJURIES/BEHAVIORS THAT COULD BE RELATED TO ABUSE/NEGLECT?: NO

## 2024-07-05 SDOH — SOCIAL STABILITY: SOCIAL INSECURITY: HAVE YOU HAD THOUGHTS OF HARMING ANYONE ELSE?: NO

## 2024-07-05 SDOH — SOCIAL STABILITY: SOCIAL INSECURITY: DOES ANYONE TRY TO KEEP YOU FROM HAVING/CONTACTING OTHER FRIENDS OR DOING THINGS OUTSIDE YOUR HOME?: NO

## 2024-07-05 SDOH — ECONOMIC STABILITY: HOUSING INSECURITY: DO YOU FEEL UNSAFE GOING BACK TO THE PLACE WHERE YOU ARE LIVING?: NO

## 2024-07-05 SDOH — SOCIAL STABILITY: SOCIAL INSECURITY: HAS ANYONE EVER THREATENED TO HURT YOUR FAMILY OR YOUR PETS?: NO

## 2024-07-05 SDOH — SOCIAL STABILITY: SOCIAL INSECURITY: VERBAL ABUSE: DENIES

## 2024-07-05 SDOH — SOCIAL STABILITY: SOCIAL INSECURITY: PHYSICAL ABUSE: DENIES

## 2024-07-05 SDOH — HEALTH STABILITY: MENTAL HEALTH: NON-SPECIFIC ACTIVE SUICIDAL THOUGHTS (PAST 1 MONTH): NO

## 2024-07-05 SDOH — SOCIAL STABILITY: SOCIAL INSECURITY: ARE YOU OR HAVE YOU BEEN THREATENED OR ABUSED PHYSICALLY, EMOTIONALLY, OR SEXUALLY BY ANYONE?: NO

## 2024-07-05 SDOH — SOCIAL STABILITY: SOCIAL INSECURITY: HAVE YOU HAD ANY THOUGHTS OF HARMING ANYONE ELSE?: NO

## 2024-07-05 SDOH — HEALTH STABILITY: MENTAL HEALTH: SUICIDAL BEHAVIOR (LIFETIME): NO

## 2024-07-05 SDOH — HEALTH STABILITY: MENTAL HEALTH: WISH TO BE DEAD (PAST 1 MONTH): NO

## 2024-07-05 SDOH — HEALTH STABILITY: MENTAL HEALTH: HAVE YOU USED ANY SUBSTANCES (CANABIS, COCAINE, HEROIN, HALLUCINOGENS, INHALANTS, ETC.) IN THE PAST 12 MONTHS?: NO

## 2024-07-05 SDOH — SOCIAL STABILITY: SOCIAL INSECURITY: DO YOU FEEL ANYONE HAS EXPLOITED OR TAKEN ADVANTAGE OF YOU FINANCIALLY OR OF YOUR PERSONAL PROPERTY?: NO

## 2024-07-05 SDOH — HEALTH STABILITY: MENTAL HEALTH: WERE YOU ABLE TO COMPLETE ALL THE BEHAVIORAL HEALTH SCREENINGS?: NO

## 2024-07-05 ASSESSMENT — PAIN SCALES - GENERAL
PAINLEVEL_OUTOF10: 0 - NO PAIN
PAINLEVEL_OUTOF10: 4
PAINLEVEL_OUTOF10: 0 - NO PAIN

## 2024-07-05 ASSESSMENT — LIFESTYLE VARIABLES
HOW OFTEN DO YOU HAVE A DRINK CONTAINING ALCOHOL: NEVER
HOW MANY STANDARD DRINKS CONTAINING ALCOHOL DO YOU HAVE ON A TYPICAL DAY: PATIENT DOES NOT DRINK
HOW OFTEN DO YOU HAVE 6 OR MORE DRINKS ON ONE OCCASION: NEVER
SKIP TO QUESTIONS 9-10: 1
AUDIT-C TOTAL SCORE: 0
AUDIT-C TOTAL SCORE: 0

## 2024-07-05 NOTE — PROGRESS NOTES
"Assessment    34 y.o.  at 39w0d  FHT Category 1  Latent labor  IOL; ripening phase  GDMA1  GBS negative    Plan    Start/Continue pitocin per protocol  Pain management per patient request  Discussed with patient SVE and potential AROM if appropriate; patient verbalized understanding  Continue assessment of maternal and fetal wellbeing  Recheck as clinically indicated by maternal or fetal status  Anticipate active phase of labor    Mckenzie Parsons, APRN-CNM    Subjective:  Rex Murphy is sitting on the side of the bed with RN at bedside waiting for epidural.  Patient is feeling contractions \"close together\" and is also ready for \"things to get moving\"    Objective:  Fetal Monitoring      Baseline FHR: 135 per minute  Variability: moderate  Accelerations: yes  Decelerations: none  TOCO: regular, every 2-3 minutes    Cervical Exam:  deferred    Membrane status: intact    Pitocin PRN    Vitals:    24 2237 24 2242 24 2247 24 2252   BP:       Pulse: 78 86 78 71   Resp:       Temp:       TempSrc:       SpO2: (!) 94% 98% 97% 98%   Weight:       Height:            "

## 2024-07-05 NOTE — PROGRESS NOTES
Assessment    34 y.o.  at 39w1d  FHT Category 2, overall reassuring for moderate variability and + accels  Latent labor    Active Problems:    Encounter for induction of labor ([facility])    Gestational diabetes mellitus (GDM) in third trimester ([facility])    Anemia affecting pregnancy in third trimester ([facility])    39 weeks gestation of pregnancy ([facility])    Plan    Encourage frequent position changes as tolerated  Maternal repositioning to hands and knees  Continue pitocin per protocol  Continue assessment of maternal and fetal wellbeing  Recheck as clinically indicated by maternal or fetal status  Anticipate active phase of labor  Lisset Shukla, APRN-CNM, APRN-CNP    Subjective:  Rex Murphy is feeling intermittent pressure.    Objective:  Fetal Monitoring      Baseline FHR: 155 per minute  Variability: moderate  Accelerations: yes  Decelerations:  occasional late decel noted  TOCO: regular, every 2 minutes    Cervical Exam:  5 cm dilated, 80 effaced, -1 station    Membrane status: ruptured    Pitocin is at 14 mu/min.    Vitals:    24 0646 24 0716 24 0840 24 0930   BP:  99/56 112/64 114/70   Pulse: 90 82 82 85   Resp:  16 18 18   Temp:  36.9 °C (98.4 °F) 37.1 °C (98.8 °F) 37.3 °C (99.1 °F)   TempSrc:  Temporal Temporal Temporal   SpO2: 96% 96% 96% 96%   Weight:       Height:

## 2024-07-05 NOTE — ANESTHESIA PROCEDURE NOTES
Epidural Block    Patient location during procedure: OB  Start time: 7/4/2024 11:01 PM  End time: 7/4/2024 11:36 PM  Reason for block: labor analgesia  Staffing  Performed: resident   Authorized by: Wei Newby DO    Performed by: Jesus Vargas DO    Preanesthetic Checklist  Completed: patient identified, IV checked, risks and benefits discussed, surgical consent, pre-op evaluation, timeout performed and sterile techniques followed  Block Timeout  RN/Licensed healthcare professional reads aloud to the Anesthesia provider and entire team: Patient identity, procedure with side and site, patient position, and as applicable the availability of implants/special equipment/special requirements.  Patient on coagulant treatment: no  Timeout performed at: 7/4/2024 11:02 PM  Block Placement  Patient position: sitting  Prep: ChloraPrep  Sterility prep: cap, drape, gloves, hand and mask  Sedation level: no sedation  Patient monitoring: blood pressure, continuous pulse oximetry and heart rate  Approach: midline  Local numbing: lidocaine 1% to skin and subcutaneous tissues  Vertebral space: lumbar  Epidural  Loss of resistance technique: saline  Guidance: landmark technique        Needle  Needle type: Tuohy   Needle gauge: 17  Needle length: 10.2 cm  Needle insertion depth: 9.5 cm  Catheter type: end hole  Catheter size: 19 G  Catheter at skin depth: 14 cm  Catheter securement method: clear occlusive dressing and liquid medical adhesive    Test dose: lidocaine 1.5% with epinephrine 1-to-200,000  Test dose: lidocaine 1.5% with epinephrine 1-to-200,000    PCEA  Medication concentration used: 0.044% Bupivacaine with 1.25 mcg/mL Fentanyl and 1:[id number] Epinephrine  Dose (mL): 10  Lockout (minutes): 15  1-Hour Limit (boluses/hr): 4  Basal Rate: 14        Assessment  Sensory level: T10 bilateral  Block outcome: patient comfortable  Number of attempts: 2  Events: paresthesia  Paresthesia: right  Paresthesia resolved:  yes  Procedure assessment: patient tolerated procedure well with no immediate complications  Additional Notes  Initial L4-L5 level with loss of resistance at 5cm. Patient reported right paresthesia upon threading epidural catheter. Catheter and needle removed with resolution of paresthesia. Second attempt at L3-4 with loss of resistance at 5cm. No further paresthesias. Catheter threaded easily.

## 2024-07-05 NOTE — PROGRESS NOTES
Assessment    34 y.o.  at 39w1d  FHT Category 1  Active labor    Active Problems:    Encounter for induction of labor ([facility])    Gestational diabetes mellitus (GDM) in third trimester ([facility])    Anemia affecting pregnancy in third trimester ([facility])    39 weeks gestation of pregnancy ([facility])    Plan    Patient repositioned to flying cowgirl with peanut ball  Encourage frequent position changes as tolerated  Continue pitocin per protocol  Continue assessment of maternal and fetal wellbeing  Recheck as clinically indicated by maternal or fetal status  Anticipate second stage of labor    Lisset Shukla, APRN-CNM, APRN-CNP    Subjective:  Rex Murphy is reporting lower abdominal pain with contractions. Denies feeling rectal pressure.    Objective:  Fetal Monitoring      Baseline FHR: 135 per minute  Variability: moderate  Accelerations: yes  Decelerations: none  TOCO: regular, every 1-2 minutes    Cervical Exam:  7 cm dilated, 100 effaced, -1 station    Membrane status: ruptured    Pitocin is at 6 mu/min.    Vitals:    24 1404 24 1427 24 1525 24 1630   BP: 121/58 122/61 116/67 110/58   Pulse: 80 75 74 71   Resp:  18 18    Temp:  36.7 °C (98.1 °F) 36.7 °C (98.1 °F) 37.1 °C (98.8 °F)   TempSrc:  Temporal Temporal Temporal   SpO2:    97%   Weight:       Height:

## 2024-07-05 NOTE — PROGRESS NOTES
Assessment    34 y.o.  at 39w1d  FHT Category 1  Active labor    Active Problems:    Encounter for induction of labor ([facility])    Gestational diabetes mellitus (GDM) in third trimester ([facility])    Anemia affecting pregnancy in third trimester ([facility])    39 weeks gestation of pregnancy ([facility])    Plan    Encourage frequent position changes as tolerated  Continue pitocin per protocol  Continue assessment of maternal and fetal wellbeing  Recheck as clinically indicated by maternal or fetal status  Anticipate second stage of labor  Lisset Shukla, APRN-CNM, APRN-CNP    Subjective:  Rex Murphy is reporting intermittent cramping. Denies feeling rectal pressure at this time.    Objective:  Fetal Monitoring      Baseline FHR: 135 per minute  Variability: moderate  Accelerations: yes  Decelerations: none  TOCO: regular, every 2 minutes, mVu = 140-150    Cervical Exam:  deferred    Membrane status: ruptured    Pitocin is at 2 mu/min.    Vitals:    24 1228 24 1229 24 1350 24 1404   BP:  112/63 (!) 144/67 121/58   Pulse: 74 76 78 80   Resp:  18 18    Temp:  37.2 °C (99 °F) 37.2 °C (99 °F)    TempSrc:  Temporal Temporal    SpO2: 96%      Weight:       Height:

## 2024-07-05 NOTE — PROGRESS NOTES
Assessment    34 y.o.  at 39w1d  FHT Category 1  Latent labor  IOL GDMA1; on pitocin  GBS negative    Plan    Encourage frequent position changes as tolerated  Start/Continue pitocin per protocol  Continue assessment of maternal and fetal wellbeing  Recheck as clinically indicated by maternal or fetal status  Anticipate active phase of labor    Mckenzie Parsons, AARON-RAYMUNDO    Subjective:  Rex Murphy is laying in right lateral; agreeable to SVE    Objective:  Fetal Monitoring      Baseline FHR: 135 per minute  Variability: moderate  Accelerations: yes  Decelerations: none  TOCO: irregular, every 1-3 minutes    Cervical Exam:  4 cm dilated, 60 effaced, -2 station    Membrane status: ruptured, pads placed under patient are wet; amniotic sac not palpable upon exam    Pitocin is at 2 mu/min.    Vitals:    24 0133 24 0217 24 0325 24 0326   BP: 106/63 102/55 101/55    Pulse: 84 79 78 87   Resp: 18 18 18    Temp: 36.5 °C (97.7 °F)  36.9 °C (98.4 °F)    TempSrc: Temporal  Temporal    SpO2: 96% (!) 95% 97% 97%   Weight:       Height:

## 2024-07-05 NOTE — PROGRESS NOTES
Assessment    34 y.o.  at 39w0d  FHT Category 1  Latent labor  IOL; ripening phase  GDMA1; 1hr Post-prandial BG while in ripening phase; clear liquids when pitocin begins  GBS negative    Plan    Patient sleeping; will recheck post epidural and AROM if appropriate  Encourage frequent position changes as tolerated  Encourage ambulation as tolerated  Start/Continue pitocin per protocol  Pain management per patient request  Continue assessment of maternal and fetal wellbeing  Recheck as clinically indicated by maternal or fetal status  Anticipate active phase of labor    Mckenzie Parsons, AARON-RAYMUNDO    Subjective:  Rex Murphy is sleeping in right lateral.    Objective:  Fetal Monitoring      Baseline FHR: 135 per minute  Variability: moderate  Accelerations: yes  Decelerations: none  TOCO:  uterine irritability noted; patient was toña Q2-3mins prior to repositioning to right lateral for sleep    Cervical Exam:  deferred    Membrane status: intact    Pitocin PRN.    Vitals:    24 1431 24 1831 24 1832 24 1919   BP: (!) 89/55  116/71 112/73   Pulse: 86 77 76 79   Resp: 16  18 19   Temp: 36.7 °C (98.1 °F)  36.7 °C (98.1 °F)    TempSrc: Oral  Oral    SpO2:  98%  97%   Weight:       Height:

## 2024-07-05 NOTE — PROGRESS NOTES
Assessment    34 y.o.  at 39w1d  FHT Category 1  Latent labor  IOL GDMA1; s/p ripening  GBS negative    Plan    Attempted to AROM; sutures felt without bulging bag; will continue to monitor for SROM  Encourage frequent position changes as tolerated  Start/Continue pitocin per protocol  Continue assessment of maternal and fetal wellbeing  Recheck as clinically indicated by maternal or fetal status  Anticipate active phase of labor    Mckenzie Parsons, APRN-RONNIEM    Subjective:  Rex Murphy is comfortable with epidural and agreeable to SVE.    Objective:  Fetal Monitoring      Baseline FHR: 140 per minute  Variability: moderate  Accelerations: yes  Decelerations: none  TOCO: irregular, every 1-4 minutes    Cervical Exam:  3 cm dilated, 60 effaced, -2 station    Membrane status:  potential SROM during epidural; did not feel palpable bag upon attempt to AROM; will continue to monitor    Pitocin to start.    Vitals:    24 2337 24 2340 24 2342 24 2343   BP: 115/56 112/59  115/59   Pulse: 73 74 75 74   Resp:       Temp:       TempSrc:       SpO2: 97%  97%    Weight:       Height:

## 2024-07-05 NOTE — PROGRESS NOTES
Assessment    34 y.o.  at 39w1d  FHT Category 1  Active labor    Active Problems:    Encounter for induction of labor ([facility])    Gestational diabetes mellitus (GDM) in third trimester ([facility])    Anemia affecting pregnancy in third trimester ([facility])    39 weeks gestation of pregnancy ([facility])    Plan    Encourage frequent position changes as tolerated  Continue pitocin per protocol  Continue assessment of maternal and fetal wellbeing  Recheck as clinically indicated by maternal or fetal status  Anticipate second stage of labor  Lisset Shukla, APRN-CNM, APRN-CNP    Subjective:  Rex Murphy is feeling increased hip and contraction pain.     Objective:  Fetal Monitoring      Baseline FHR: 135 per minute  Variability: moderate  Accelerations: yes  Decelerations: none  TOCO: regular, every 2-3 minutes    Cervical Exam:  9.5 cm dilated, 100 effaced, +1 station    Membrane status: ruptured    Pitocin is at 8 mu/min.    Vitals:    24 1525 24 1630 24 1727 24 1829   BP: 116/67 110/58 126/74 120/66   Pulse: 74 71 71 68   Resp: 18 18 18 18   Temp: 36.7 °C (98.1 °F) 37.1 °C (98.8 °F) 36.7 °C (98.1 °F) 36.4 °C (97.5 °F)   TempSrc: Temporal Temporal Temporal Temporal   SpO2:  97%     Weight:       Height:

## 2024-07-05 NOTE — PROGRESS NOTES
Assessment    34 y.o.  at 39w1d  FHT Category 2, overall moderate variability and + accels  Active labor     Active Problems:    Encounter for induction of labor ([facility])    Gestational diabetes mellitus (GDM) in third trimester ([facility])    Anemia affecting pregnancy in third trimester ([facility])    39 weeks gestation of pregnancy ([facility])    Plan    Discussed with patient placing IUPC to better assess contraction frequency; patient agreeable  IUPC placed without difficulty  Encourage frequent position changes as tolerated  Restart pitocin per protocol if contractions space  Continue assessment of maternal and fetal wellbeing  Recheck as clinically indicated by maternal or fetal status  Anticipate second stage of labor  Lisset Shukla, APRN-CNM, APRN-CNP    Subjective:  Rex Murphy is feeling comfortable with epidural. Having some N/V. At bedside following episode of tachysystole. RN reporting difficulty tracing contractions at times.    Objective:  Fetal Monitoring      Baseline FHR: 150 per minute  Variability: moderate  Accelerations: yes  Decelerations:  late decels noted during tachysystole  TOCO: regular, every 1 minutes    Cervical Exam:  6 cm dilated, 90 effaced, -1 station    Membrane status: ruptured    Pitocin is now off, was at 16 mu/min.    Vitals:    24 1102 24 1103 24 1127 24 1128   BP:  109/57  110/68   Pulse: 101 96 83 83   Resp:       Temp:  37 °C (98.6 °F) 37.2 °C (99 °F)    TempSrc:  Temporal Temporal    SpO2: 97%  97%    Weight:       Height:

## 2024-07-05 NOTE — PROGRESS NOTES
Assessment    34 y.o.  at 39w1d  FHT Category 1  Latent labor    Active Problems:    Encounter for induction of labor ([facility])    Gestational diabetes mellitus (GDM) in third trimester ([facility])    Anemia affecting pregnancy in third trimester ([facility])    39 weeks gestation of pregnancy ([facility])    Plan    Encourage frequent position changes as tolerated  Continue pitocin per protocol  Continue assessment of maternal and fetal wellbeing  Recheck as clinically indicated by maternal or fetal status  Anticipate active phase of labor  Lisset Shukla, APRN-CNM, APRN-CNP    Subjective:  Rex Murphy is comfortable with epidural.     Objective:  Fetal Monitoring      Baseline FHR: 145 per minute  Variability: moderate  Accelerations: yes  Decelerations: none  TOCO: irregular, every 1-6 minutes    Cervical Exam:  deferred    Membrane status: ruptured (AROM 24 0021)    Pitocin is at 6 mu/min.    Vitals:    24 0636 24 0641 24 0646 24 0716   BP:    99/56   Pulse: 89 81 90 82   Resp:    16   Temp:    36.9 °C (98.4 °F)   TempSrc:    Temporal   SpO2: 97% 96% 96% 96%   Weight:       Height:

## 2024-07-06 LAB
BASE EXCESS BLDCOA CALC-SCNC: -11.4 MMOL/L (ref -10.8–-0.5)
BASE EXCESS BLDCOV CALC-SCNC: -7.7 MMOL/L (ref -8.1–-0.5)
BODY TEMPERATURE: 37 DEGREES CELSIUS
BODY TEMPERATURE: 37 DEGREES CELSIUS
GLUCOSE BLD MANUAL STRIP-MCNC: 110 MG/DL (ref 74–99)
HCO3 BLDCOA-SCNC: 19.7 MMOL/L (ref 15–29)
HCO3 BLDCOV-SCNC: 21.5 MMOL/L (ref 16–26)
INHALED O2 CONCENTRATION: 21 %
INHALED O2 CONCENTRATION: 21 %
OXYHGB MFR BLDCOA: 31.6 % (ref 94–98)
OXYHGB MFR BLDCOV: 10.8 % (ref 94–98)
PCO2 BLDCOA: 68 MM HG (ref 31–75)
PCO2 BLDCOV: 59 MM HG (ref 22–53)
PH BLDCOA: 7.07 PH (ref 7.08–7.39)
PH BLDCOV: 7.17 PH (ref 7.19–7.47)
PO2 BLDCOA: 19 MM HG (ref 5–31)
PO2 BLDCOV: 10 MM HG (ref 13–37)
SAO2 % BLDCOA: 32 % (ref 3–69)
SAO2 % BLDCOV: 11 % (ref 16–84)

## 2024-07-06 PROCEDURE — 7100000016 HC LABOR RECOVERY PER HOUR

## 2024-07-06 PROCEDURE — 82805 BLOOD GASES W/O2 SATURATION: CPT | Performed by: ADVANCED PRACTICE MIDWIFE

## 2024-07-06 PROCEDURE — 82810 BLOOD GASES O2 SAT ONLY: CPT | Performed by: ADVANCED PRACTICE MIDWIFE

## 2024-07-06 PROCEDURE — 2500000001 HC RX 250 WO HCPCS SELF ADMINISTERED DRUGS (ALT 637 FOR MEDICARE OP)

## 2024-07-06 PROCEDURE — 82947 ASSAY GLUCOSE BLOOD QUANT: CPT

## 2024-07-06 PROCEDURE — 2500000004 HC RX 250 GENERAL PHARMACY W/ HCPCS (ALT 636 FOR OP/ED): Mod: JZ

## 2024-07-06 PROCEDURE — 0DQR0ZZ REPAIR ANAL SPHINCTER, OPEN APPROACH: ICD-10-PCS | Performed by: OBSTETRICS & GYNECOLOGY

## 2024-07-06 PROCEDURE — 59050 FETAL MONITOR W/REPORT: CPT

## 2024-07-06 PROCEDURE — 2500000001 HC RX 250 WO HCPCS SELF ADMINISTERED DRUGS (ALT 637 FOR MEDICARE OP): Performed by: STUDENT IN AN ORGANIZED HEALTH CARE EDUCATION/TRAINING PROGRAM

## 2024-07-06 PROCEDURE — 88307 TISSUE EXAM BY PATHOLOGIST: CPT | Mod: TC,SUR

## 2024-07-06 PROCEDURE — 0KQM0ZZ REPAIR PERINEUM MUSCLE, OPEN APPROACH: ICD-10-PCS | Performed by: OBSTETRICS & GYNECOLOGY

## 2024-07-06 PROCEDURE — 2500000004 HC RX 250 GENERAL PHARMACY W/ HCPCS (ALT 636 FOR OP/ED)

## 2024-07-06 PROCEDURE — 1100000001 HC PRIVATE ROOM DAILY

## 2024-07-06 PROCEDURE — 2500000005 HC RX 250 GENERAL PHARMACY W/O HCPCS

## 2024-07-06 RX ORDER — BISACODYL 10 MG/1
10 SUPPOSITORY RECTAL DAILY PRN
Status: DISCONTINUED | OUTPATIENT
Start: 2024-07-06 | End: 2024-07-07 | Stop reason: HOSPADM

## 2024-07-06 RX ORDER — TRANEXAMIC ACID 100 MG/ML
1000 INJECTION, SOLUTION INTRAVENOUS ONCE AS NEEDED
Status: DISCONTINUED | OUTPATIENT
Start: 2024-07-06 | End: 2024-07-07 | Stop reason: HOSPADM

## 2024-07-06 RX ORDER — METRONIDAZOLE 500 MG/1
500 TABLET ORAL ONCE
Status: COMPLETED | OUTPATIENT
Start: 2024-07-06 | End: 2024-07-06

## 2024-07-06 RX ORDER — METHYLERGONOVINE MALEATE 0.2 MG/ML
0.2 INJECTION INTRAVENOUS ONCE AS NEEDED
Status: DISCONTINUED | OUTPATIENT
Start: 2024-07-06 | End: 2024-07-07 | Stop reason: HOSPADM

## 2024-07-06 RX ORDER — POLYETHYLENE GLYCOL 3350 17 G/17G
17 POWDER, FOR SOLUTION ORAL DAILY
Status: DISCONTINUED | OUTPATIENT
Start: 2024-07-06 | End: 2024-07-07 | Stop reason: HOSPADM

## 2024-07-06 RX ORDER — ADHESIVE BANDAGE
10 BANDAGE TOPICAL
Status: DISCONTINUED | OUTPATIENT
Start: 2024-07-06 | End: 2024-07-07 | Stop reason: HOSPADM

## 2024-07-06 RX ORDER — LIDOCAINE 560 MG/1
1 PATCH PERCUTANEOUS; TOPICAL; TRANSDERMAL
Status: DISCONTINUED | OUTPATIENT
Start: 2024-07-06 | End: 2024-07-07 | Stop reason: HOSPADM

## 2024-07-06 RX ORDER — ONDANSETRON HYDROCHLORIDE 2 MG/ML
4 INJECTION, SOLUTION INTRAVENOUS EVERY 6 HOURS PRN
Status: DISCONTINUED | OUTPATIENT
Start: 2024-07-06 | End: 2024-07-07 | Stop reason: HOSPADM

## 2024-07-06 RX ORDER — DIPHENHYDRAMINE HYDROCHLORIDE 50 MG/ML
25 INJECTION INTRAMUSCULAR; INTRAVENOUS ONCE
Status: COMPLETED | OUTPATIENT
Start: 2024-07-06 | End: 2024-07-06

## 2024-07-06 RX ORDER — DIPHENHYDRAMINE HYDROCHLORIDE 50 MG/ML
25 INJECTION INTRAMUSCULAR; INTRAVENOUS EVERY 6 HOURS PRN
Status: DISCONTINUED | OUTPATIENT
Start: 2024-07-06 | End: 2024-07-07 | Stop reason: HOSPADM

## 2024-07-06 RX ORDER — CARBOPROST TROMETHAMINE 250 UG/ML
250 INJECTION, SOLUTION INTRAMUSCULAR ONCE AS NEEDED
Status: DISCONTINUED | OUTPATIENT
Start: 2024-07-06 | End: 2024-07-07 | Stop reason: HOSPADM

## 2024-07-06 RX ORDER — OXYTOCIN/0.9 % SODIUM CHLORIDE 30/500 ML
60 PLASTIC BAG, INJECTION (ML) INTRAVENOUS ONCE AS NEEDED
Status: DISCONTINUED | OUTPATIENT
Start: 2024-07-06 | End: 2024-07-07 | Stop reason: HOSPADM

## 2024-07-06 RX ORDER — OXYTOCIN 10 [USP'U]/ML
10 INJECTION, SOLUTION INTRAMUSCULAR; INTRAVENOUS ONCE AS NEEDED
Status: DISCONTINUED | OUTPATIENT
Start: 2024-07-06 | End: 2024-07-07 | Stop reason: HOSPADM

## 2024-07-06 RX ORDER — DOCUSATE SODIUM 100 MG/1
100 CAPSULE, LIQUID FILLED ORAL 2 TIMES DAILY PRN
Status: DISCONTINUED | OUTPATIENT
Start: 2024-07-06 | End: 2024-07-07 | Stop reason: HOSPADM

## 2024-07-06 RX ORDER — LOPERAMIDE HYDROCHLORIDE 2 MG/1
4 CAPSULE ORAL EVERY 2 HOUR PRN
Status: DISCONTINUED | OUTPATIENT
Start: 2024-07-06 | End: 2024-07-07 | Stop reason: HOSPADM

## 2024-07-06 RX ORDER — MISOPROSTOL 200 UG/1
800 TABLET ORAL ONCE AS NEEDED
Status: DISCONTINUED | OUTPATIENT
Start: 2024-07-06 | End: 2024-07-07 | Stop reason: HOSPADM

## 2024-07-06 RX ORDER — POLYETHYLENE GLYCOL 3350 17 G/17G
17 POWDER, FOR SOLUTION ORAL 2 TIMES DAILY PRN
Status: DISCONTINUED | OUTPATIENT
Start: 2024-07-06 | End: 2024-07-07 | Stop reason: HOSPADM

## 2024-07-06 RX ORDER — DOCUSATE SODIUM 100 MG/1
100 CAPSULE, LIQUID FILLED ORAL 2 TIMES DAILY
Status: DISCONTINUED | OUTPATIENT
Start: 2024-07-06 | End: 2024-07-06

## 2024-07-06 RX ORDER — NIFEDIPINE 10 MG/1
10 CAPSULE ORAL ONCE AS NEEDED
Status: DISCONTINUED | OUTPATIENT
Start: 2024-07-06 | End: 2024-07-07 | Stop reason: HOSPADM

## 2024-07-06 RX ORDER — LABETALOL HYDROCHLORIDE 5 MG/ML
20 INJECTION, SOLUTION INTRAVENOUS ONCE AS NEEDED
Status: DISCONTINUED | OUTPATIENT
Start: 2024-07-06 | End: 2024-07-07 | Stop reason: HOSPADM

## 2024-07-06 RX ORDER — SIMETHICONE 80 MG
80 TABLET,CHEWABLE ORAL 4 TIMES DAILY PRN
Status: DISCONTINUED | OUTPATIENT
Start: 2024-07-06 | End: 2024-07-07 | Stop reason: HOSPADM

## 2024-07-06 RX ORDER — HYDRALAZINE HYDROCHLORIDE 20 MG/ML
5 INJECTION INTRAMUSCULAR; INTRAVENOUS ONCE AS NEEDED
Status: DISCONTINUED | OUTPATIENT
Start: 2024-07-06 | End: 2024-07-07 | Stop reason: HOSPADM

## 2024-07-06 RX ORDER — CEFAZOLIN SODIUM 2 G/100ML
2 INJECTION, SOLUTION INTRAVENOUS ONCE
Status: COMPLETED | OUTPATIENT
Start: 2024-07-06 | End: 2024-07-06

## 2024-07-06 RX ORDER — IBUPROFEN 600 MG/1
600 TABLET ORAL EVERY 6 HOURS
Status: DISCONTINUED | OUTPATIENT
Start: 2024-07-06 | End: 2024-07-07 | Stop reason: HOSPADM

## 2024-07-06 RX ORDER — DIPHENHYDRAMINE HCL 25 MG
25 CAPSULE ORAL EVERY 6 HOURS PRN
Status: DISCONTINUED | OUTPATIENT
Start: 2024-07-06 | End: 2024-07-07 | Stop reason: HOSPADM

## 2024-07-06 RX ORDER — ACETAMINOPHEN 325 MG/1
975 TABLET ORAL EVERY 6 HOURS
Status: DISCONTINUED | OUTPATIENT
Start: 2024-07-06 | End: 2024-07-07 | Stop reason: HOSPADM

## 2024-07-06 RX ORDER — ONDANSETRON 4 MG/1
4 TABLET, FILM COATED ORAL EVERY 6 HOURS PRN
Status: DISCONTINUED | OUTPATIENT
Start: 2024-07-06 | End: 2024-07-07 | Stop reason: HOSPADM

## 2024-07-06 ASSESSMENT — PAIN SCALES - GENERAL
PAINLEVEL_OUTOF10: 3
PAINLEVEL_OUTOF10: 0 - NO PAIN
PAINLEVEL_OUTOF10: 0 - NO PAIN
PAINLEVEL_OUTOF10: 2
PAINLEVEL_OUTOF10: 4
PAINLEVEL_OUTOF10: 2
PAINLEVEL_OUTOF10: 0 - NO PAIN
PAINLEVEL_OUTOF10: 6
PAINLEVEL_OUTOF10: 7
PAINLEVEL_OUTOF10: 0 - NO PAIN

## 2024-07-06 ASSESSMENT — PAIN DESCRIPTION - DESCRIPTORS
DESCRIPTORS: DISCOMFORT
DESCRIPTORS: DISCOMFORT

## 2024-07-06 NOTE — L&D DELIVERY NOTE
OB Delivery Note  2024  Rex Murphy  34 y.o.   Vaginal, Spontaneous        Gestational Age: 39w1d  /Para:   Quantitative Blood Loss: Admission to Discharge: 200 mL (2024 10:40 AM - 2024  5:38 AM)    Saul Murphy [[id number]]      Labor Events    Rupture date/time: 2024 0021  Rupture type: Artificial  Fluid color: Clear  Fluid odor: None  Labor type: Induced Onset of Labor  Labor allowed to proceed with plans for an attempted vaginal birth?: Yes  Induction: AROM, Misoprostol, Oxytocin  First cervical ripening date/time: 2024  Induction date/time: 2024  Induction indications: Diabetes  Complications: None       Labor Event Times    Labor onset date/time: 2024  Dilation complete date/time: 2024  Start pushing date/time: 2024       Labor Length    1st stage: 31h 32m  2nd stage: 4h 13m  3rd stage: 0h 16m       Placenta    Placenta delivery date/time: 2024 0014  Placenta removal: Spontaneous  Placenta appearance: Intact  Placenta disposition: discarded       Cord    Vessels: 3 vessels  Complications: None  Delayed cord clamping?: Yes  Cord blood disposition: Lab  Gases sent?: Yes  Stem cell collection (by provider): No       Lacerations    Episiotomy: None  Perineal laceration: 3b  Perineal laceration repaired?: Yes  Other lacerations?: No  Repair suture: 3-0 Synthetic Suture, 4-0 Synthetic Suture       Anesthesia    Method: Epidural       Operative Delivery    Forceps attempted?: No  Vacuum extractor attempted?: No       Shoulder Dystocia    Shoulder dystocia present?: No        Delivery    Time head delivered: 2024 23:57:00  Birth date/time: 2024 23:58:00  Delivery type: Vaginal, Spontaneous  Complications: None       Resuscitation    Method: Suctioning, Tactile stimulation, Continuous positive airway pressure (CPAP), Positive pressure ventilation (PPV)       Apgars    Living status: Living  Apgar Component Scores:  1 min.:  5  min.:  10 min.:  15 min.:  20 min.:    Skin color:  0  0  1      Heart rate:  2  2  2      Reflex irritability:  1  1  2      Muscle tone:  1  0  1      Respiratory effort:  2  0  2      Total:  6  3  8      Apgars assigned by: YAMILET ADAME RN       Delivery Providers    Delivering clinician: DELICIA Tavera   Provider Role    Krystle Marquez, RN Delivery Nurse    Yamilet Adame, RN Nursery Nurse     Resident               Delivery Details:  Rex Murphy, a 34 y.o.  female delivered a viable Male infant with Apgars of 6  and 3  and 8 post resuscitation. Patient was fully dilated and pushing after 31 hours 32 minutes in active labor. The patient was put in the dorsal lithotomy position and put on her side. Delivery was via Vaginal, Spontaneous  to a sterile field under Epidural  anesthesia. Infant delivered in Vertex Right Occiput Anterior  position. Anterior and posterior shoulders delivered without difficulty. The cord was clamped twice, cut and 3 vessels  were noted. Cord blood was obtained in routine fashion.  Cord complications were:  none. Intact placenta delivered at 2024 12:14 AM . Fundal massage performed and fundus found to be firm. Perineum, vagina, cervix were inspected, and the following lacerations were noted:   Saul Murphy [[id number]]      Lacerations    Episiotomy: None  Perineal laceration: 3b  Perineal laceration repaired?: Yes  Other lacerations?: No  Repair suture: 3-0 Synthetic Suture, 4-0 Synthetic Suture            Any lacerations were repaired in the usual fashion using 3-0 Synthetic Suture;4-0 Synthetic Suture  via Dr. Pozo and Dr. Harding. Excellent hemostasis was noted. Needle count correct. Infant and patient in delivery room in good and stable condition.       Intrauterine Device Inserted : No,      DELICIA Tavera

## 2024-07-06 NOTE — LACTATION NOTE
Lactation Consultant Note  Lactation Consultation  Reason for Consult: Initial assessment  Consultant Name: Valarie Freitas RN, IBCLC    Maternal Information  Has mother  before?: No  Infant to breast within first 2 hours of birth?: Yes  Exclusive Pump and Bottle Feed: No    Maternal Assessment  Breast Assessment: Medium, Soft, Compressible, Warm  Nipple Assessment: Intact, Sore, Short, Erect with stimulation  Areola Assessment: Normal    Infant Assessment  Infant Behavior: Sleepy  Infant Assessment: Sublingual frenulum (comment) (potential tight frenulum; peds made aware)    Feeding Assessment  Nutrition Source: Breastmilk, Formula (per mother’s request), Formula (medically indicated)  Feeding Method: Nursing at the breast, Paced bottle, Feeding expressed breastmilk  Feeding Position: Cross - cradle, Football/seated, Skin to skin  Suck/Feeding: Unsustained  Latch Assessment: Reluctant, Too sleepy, Unable to arouse for feeding, Maximum assistance is needed, Shallow latch, Minimal audible swallowing    LATCH TOOL  Latch: Repeated attempts, hold nipple in mouth, stimulate to suck  Audible Swallowing: A few with stimulation  Type of Nipple: Everted (After stimulation)  Comfort (Breast/Nipple): Filling, red/small blisters/bruises, mild/moderate discomfort  Hold (Positioning): Full assist, staff holds infant at breast  LATCH Score: 5    Breast Pump       Other OB Lactation Tools       Patient Follow-up  Inpatient Lactation Follow-up Needed : Yes  Outpatient Lactation Follow-up: Recommended    Other OB Lactation Documentation  Maternal Risk Factors: Age over 30, primiparity, Diabetes (gestational, types 1 or 2)  Infant Risk Factors: Early term birth 37-39 weeks    Recommendations/Summary  Called to room to assist with breastfeeding. Mother states she has been intermittently latching infant to breast and supplementing with formula and began that r/t infant's blood sugar and her having gestational diabetes.      Mother states infant last fed at about 1100 some drops of colostrum she hand expressed and 3 ml formula. Upon entering the room, infant asleep and swaddled. I suggested unwrapping infant and placing him skin to skin with mother. Mother agreeable. Infant's suck assessed, potential tight frenulum noted, peds made aware will reassess tomorrow per JACKLYN Sandy DO. Mother has nipples that are short, erect with stimulation and are sore. Hand expression performed and mother expressible on both breasts. Reviewed how to hand express with mother.     Infant first placed at right breast in football hold with pillow support. Infant sleepy, not opening up mouth wide. Breast sandwich created and infant able to latch to right breast. Infant had a few sucks right after latching then fell asleep. One swallow noted. Tactile stimulation did not work to keep infant latched or keep infant sucking/swallowing. Offered to assist with latching infant in cross-cradle hold at right breast, mother agreeable. Infant again, not opening up mouth very wide but able to get infant latched using breast sandwich. Infant had a few sucks and one swallow then unlatched and was a bit fussy, difficulty re-latching. I suggested some skin to skin then trying to latch/feed infant at left breast. Mother agreeable. Infant placed skin to skin then moved to left breast in football hold with pillow support. We did not have much success with latching infant to left breast as infant was a bit fussy, mother getting increasingly crampy and uncomfortable. Infant placed skin to skin then in bassinet and swaddled and fell asleep.     Discussed normal  feeding pattern in the first 24 hours of life. Encouraged mother to feed infant at breast based on feeding cues and wake to feed or try to feed if it has been 3 hours since last feed. Encouraged skin to skin to promote breastfeeding, normalization of vitals and to calm infant. Mother has a breast pump for home  use. Outpatient lactation resources discussed with mother. I encouraged mother to call for any questions, concerns or assistance.  Discussed that based on 24 hour weight might recommend beginning to pump and/or supplement (with donor breast milk or formula per her preference). Mother on board, per mother preference I believe would be donor breast milk vs. Formula if needing to supplement.

## 2024-07-06 NOTE — OP NOTE
-- DO NOT REPLY / DO NOT REPLY ALL --  -- Message is from [facility] [facility] [employer] ([facility]) --    General Patient Message:  Patient is calling regarding the no show letter she received for an appointment for 3-.  She states she did not  make an appointment for this day.  She states her medical history does not show diabetes or hyperglycemia.  She is asking for a written response not a phone call back.      Alternative phone number: no    Can a detailed message be left? Yes    Message Turnaround:     Is it Working Hours? Yes - Working Hours     [place]:    Please give this turnaround time to the caller:   \"This message will be sent to [state Provider's name]. The clinical team will fulfill your request as soon as they review your message.\"                 Operative Note - Stage 3b Perineal Laceration    Procedure Details:  Asked to assess laceration by CNM after delivery.     Findings: Stage 3b perineal laceration following vaginal delivery, normal appearing vulva, vagina outside of lacerations    Delivering Provider: Kathy Parsons CNM  Surgical Team: Carmen Pozo MD; Yamilet Duong MD    Location of Repair: Labor room  Antibiotics: Ancef, Flagyl  Anesthesia: Epidural and local anesthesia    Procedure:   Patient confirmed adequate anesthesia for her comfort and appropriate positioning.  1% lidocaine was injected to improve her anesthesia.     Inspection:  Inspection of the labia majora, perineum, and posterior vaginal wall was performed. The labia majora were noted to be intact. To thoroughly inspect the perineum, a digital rectal exam was performed with the use of an index finger and upward pressure, the full length of the rectum was inspected and noted to be intact. The external anal sphincter was noted to be disrupted. The internal anal sphincter was inspected and noted to be intact. The laceration was 5 cm into the vagina and 5 cm deep toward the rectum.     OASIS Diagnosis:  Stage 3b Perineal Laceration    Repair:   Repair of EAS:   Overlapping Repair: EAS was identified and grasped with Allis clamps. Mobilization was not necessary. The transected edge of the EAS was brought together with 3 simple interrupted 2-0 PDS sutures were passed through both full-thickness segments and tied posteriorly, anteriorly, and superiorly. Deep space was closed with interrupted 3-0 Vicryl sutures.     2nd Degree Repair:   The vaginal muscularis was closed from the apex of the laceration down to the hymenal ring using running 3-0 Vicryl. The perineal body was then reconstructed by suturing the retracted ends of the bulbocavernosus muscles across the midline and reattaching these muscles to the vaginal muscularis to restore continuity of the posterior vaginal wall  to the perineal body. The superficial and deep perineal muscles were re-approximated with 3-0 Vicryl interrupted sutures. The superficial vaginal epithelium and perineal epithelium were closed using a single running intracutaneous 3-0 Vicryl suture.     A rectovaginal exam was completed to confirm adequacy of the repair and to further inspect for any defects or suture material in the rectal mucosa. None were identified.    Sponge and needle counts were correct at the end of the procedure.   Patient and her family were informed of the injury sustained and follow up as below. All questions were answered.     OASIS Checklist:   - Ordered Bowel Regimen  - OB provider follow up in 7-10 days  - Comprehensive exam at post partum visit  - Referral to [facility] for lasting defect or complex laceration.    Dr. Pozo was present for the entire repair.    Yamilet Duong MD   PGY-2, Labor and Delivery     I was present for the entirety of the procedure(s).     Carmen Pozo MD

## 2024-07-06 NOTE — DISCHARGE INSTRUCTIONS

## 2024-07-06 NOTE — ANESTHESIA POSTPROCEDURE EVALUATION
Patient: eRx Murphy    Procedure Summary       Date: 24 Room / Location:     Anesthesia Start: 2301 Anesthesia Stop: 248    Procedure: Labor Analgesia Diagnosis:     Scheduled Providers:  Responsible Provider: Ede Dupont MD    Anesthesia Type: epidural ASA Status: 2          Anesthesia Type: epidural    Rex Murphy is a 34 y.o., , who had a Vaginal, Spontaneous  delivery on 2024  at 39w1d and is now POD1.    She had Neuraxial Anesthesia without immediate complications noted.       Patient is currently experiencing ongoing pain which is being addressed.     Vitals:    24 0748   BP: 105/70   Pulse: 74   Resp: 18   Temp: 36.4 °C (97.5 °F)   SpO2: 96%     Neuraxial site assessed. No visible redness or swelling or drainage. Patient able to ambulate and move all extremities without difficulty. Able to void. No complaints of nausea/vomiting. Tolerating PO intake well. No s/sx of PDPH.     Anesthesia will sign off     Silvia Hogue MD      No notable events documented.

## 2024-07-06 NOTE — SIGNIFICANT EVENT
Called by RN due to patient having allergic reaction to Cefazolin.    Upon entrance to the room, patient has large hive noted on her cheeks bilaterally and one large hive on her left shoulder blade.    Patient states they are itchy; no skin changes anywhere else on body.    Patient denies scratchy throat or throat swelling.    IV benadryl order to be given now.    Patient will stay on Mac2 for 30 more minutes to monitor for further worsening of allergic reaction.    Will continue to monitor.    Mckenzie Parsons, AARON-RAYMUNDO

## 2024-07-06 NOTE — PROGRESS NOTES
Postpartum Progress Note    Assessment/Plan   Rex Murphy is a 34 y.o., , who was admitted for term IOL, delivered at 39w1d gestation and is now postpartum day 1 s/p .     Routine postpartum care  - meeting all milestones  - bonding with male infant  - pain well controlled on po medications  - DVT Score: 3 - encourage ambulation and  SCDs  - B+, Rhogam not indicated  - admission hgb: 11.3  - PPBC: will discuss    3b Perineal Laceration   - s/p repair in OR  - voiding, not yet passing flatus   - bowel regimen ordered   - pain well-controlled today   - unable to assess laceration today - multiple family members in room, will assess tomorrow  - ERAD referral at [place]     Maternal Well-Being  - emotional support provided     Feeding  - breastfeeding/pumping encouraged; lactation consult prn    Dispo:  anticipate d/c on PPD #2 if meeting all postpartum milestones, for f/u 1 month with Primary OB provider    DEEPALI Rausch    Active Problems:    Anemia affecting pregnancy in third trimester ([facility])    Gestational diabetes mellitus (GDM) in third trimester ([facility])    Encounter for induction of labor ([facility])    39 weeks gestation of pregnancy ([facility])    Pregnancy Problems (from 23 to present)       Problem Noted Resolved    39 weeks gestation of pregnancy ([facility]) 2024 by Tanvi Silva MD No    Priority:  Medium      Encounter for induction of labor ([facility]) 2024 by DELICIA Gutierrez, APRN-CNP No    Priority:  Medium      Anemia affecting pregnancy in third trimester ([facility]) 2024 by DELICIA Gooden No    Priority:  Medium      Overview Addendum 2024 12:57 PM by DLEICIA Collazo, APRN-CNP      10.0 Hgb Ferritin 26, started on PO fe BID   hgb = 11.2         Gestational diabetes mellitus (GDM) in third trimester ([facility]) 2024 by DELICIA Gooden No    Priority:  Medium      Overview Addendum  6/25/2024  3:06 PM by ALEKS Bunch     1hr 154  3hr abnormal     MFM appt on 5/9  Currently well-controlled with diet changes  5/6/2024 : nutrition  5/14/2024 Nutrition plan alone   5/21/2024 Nutrition plan alone   5/28/2024 Nutrition plan alone   6/4/2024 Nutrition plan   6/11/2024 No change - Nutrition plan     6/18/2024 Nutrition plan    6/25/2024  Nutrition plan               Family history of neural tube defect 12/7/2023 by DELICIA Gooden No    Priority:  Medium      Overview Addendum 1/4/2024 12:26 PM by DELICIA Gooden     Unknown specifically FOB had spine surgery at day 1 of life in [place]  Genetics consult placed and done see note         Decreased fetal movements in third trimester ([facility]) 5/17/2024 by DELICIA Gutierrez APRN-CNP 7/5/2024 by DELICIA Collazo, UAGIECNP    Uterine size-date discrepancy in third trimester ([facility]) 2/20/2024 by DELICIA Gooden 7/5/2024 by DELICIA Collazo, APRN-CNP    Overview Addendum 5/3/2024  4:09 PM by Nevaeh Hardy MD     AC 23rd%tile EFW 10th%tile   Resolved!     4/26  EFW 31%tile Ac16th%tile                 Subjective   Her pain is well controlled with current medications  She is not yet passing flatus  She is ambulating well  She is tolerating a Adult diet Regular  She reports no breast or nursing problems  She denies emotional concerns today     Objective   Allergies:   Amoxicillin and Cefazolin in dextrose (iso-os)         Last Vitals:  Temp Pulse Resp BP MAP Pulse Ox   36.9 °C (98.4 °F) 72 18 110/74   97 %     Vitals Min/Max Last 24 Hours:  Temp  Min: 36.4 °C (97.5 °F)  Max: 36.9 °C (98.4 °F)  Pulse  Min: 67  Max: 111  Resp  Min: 18  Max: 18  BP  Min: 105/70  Max: 144/65    Intake/Output:     Intake/Output Summary (Last 24 hours) at 7/6/2024 1832  Last data filed at 7/6/2024 0914  Gross per 24 hour   Intake 90 ml   Output 350 ml   Net -260 ml       Physical  Exam:  General: well appearing, well-nourished, postpartum  Obstetric: abdomen soft/non-tender, fundus firm below umbilicus, lochia light  Skin: No rashes/lesions/erythema  Neuro: A/Ox3, conversational  GI: +flatus  Respiratory: Even and unlabored on RA  Extremities: No edema, discoloration, or pain in BLE, equal and palpable DP and PT pulses    Psych: appropriate mood and affect     Lab Data:  Lab Results   Component Value Date    WBC 6.4 07/04/2024    HGB 11.3 (L) 07/04/2024    HCT 33.9 (L) 07/04/2024     07/04/2024

## 2024-07-07 VITALS
HEIGHT: 61 IN | RESPIRATION RATE: 16 BRPM | TEMPERATURE: 98.2 F | SYSTOLIC BLOOD PRESSURE: 110 MMHG | OXYGEN SATURATION: 98 % | BODY MASS INDEX: 30.14 KG/M2 | HEART RATE: 71 BPM | WEIGHT: 159.61 LBS | DIASTOLIC BLOOD PRESSURE: 72 MMHG

## 2024-07-07 PROCEDURE — 2500000001 HC RX 250 WO HCPCS SELF ADMINISTERED DRUGS (ALT 637 FOR MEDICARE OP): Performed by: STUDENT IN AN ORGANIZED HEALTH CARE EDUCATION/TRAINING PROGRAM

## 2024-07-07 PROCEDURE — 2500000004 HC RX 250 GENERAL PHARMACY W/ HCPCS (ALT 636 FOR OP/ED): Performed by: STUDENT IN AN ORGANIZED HEALTH CARE EDUCATION/TRAINING PROGRAM

## 2024-07-07 ASSESSMENT — PAIN SCALES - GENERAL
PAINLEVEL_OUTOF10: 5 - MODERATE PAIN
PAINLEVEL_OUTOF10: 6

## 2024-07-07 NOTE — LACTATION NOTE
Lactation Consultant Note  Lactation Consultation  Reason for Consult: Follow-up assessment  Consultant Name: Valarie Freitas RN, IBCLC    Maternal Information  Has mother  before?: No  Infant to breast within first 2 hours of birth?: Yes  Exclusive Pump and Bottle Feed: No    Maternal Assessment  Breast Assessment: Medium, Warm, Soft, Compressible  Nipple Assessment: Intact, Short, Erect with stimulation (nipple/breast sensitive and a bit sore)  Alterations in Nipple Condition: Stage I - pain or irritation with no skin break down  Areola Assessment: Normal    Infant Assessment  Infant Behavior: Sleepy  Infant Assessment:  (unable to get infant to rhythmically suck on my finger with assessment or at breast; suck a bit disorganized)    Feeding Assessment  Nutrition Source: Breastmilk  Feeding Method: Nursing at the breast, Feeding expressed breastmilk  Feeding Position: Breast sandwich, Cross - cradle, Football/seated, Skin to skin, Both sides, Mother needs assistance with latch/positioning  Suck/Feeding: Unsustained, Disorganized suck  Latch Assessment: Reluctant, Too sleepy, Unable to arouse for feeding (deep latch achieved with nipple shield and infant had two sucks then fell asleep, seemed disinterested, not hungry)    LATCH TOOL  Latch: Repeated attempts, hold nipple in mouth, stimulate to suck  Audible Swallowing: None  Type of Nipple: Everted (After stimulation)  Comfort (Breast/Nipple): Filling, red/small blisters/bruises, mild/moderate discomfort  Hold (Positioning): Minimal assist, teach one side, mother does other, staff holds  LATCH Score: 5    Breast Pump  Pump: Hospital grade electric pump  Frequency: 8-10 times per day (educated/encouraged)  Duration: Initiate phase  Breast Shield Size and Type: 21 mm  Units of Volume: Drops    Other OB Lactation Tools  Lactation Tools: Flanges, Nipple shields    Patient Follow-up  Inpatient Lactation Follow-up Needed : Yes  Outpatient Lactation  "Follow-up: Recommended    Other OB Lactation Documentation  Maternal Risk Factors: Age over 30, primiparity, Diabetes (gestational, types 1 or 2)    Recommendations/Summary  Upon entering the room, mother states infant due to attempt to feed around this time. Since yesterday, mother states has had difficulty with latching infant still, seeming spitty, burping, appropriate output with urine and stool. Offered to assist with latching infant, mother agreeable.     Suck assessment performed and unable to get infant to rhythmically suck on my finger, tongue able to elevate past alveolar ridge. Mother has shorter nipples that erect with stimulation, mother's breasts are sensitive/tender and a bit sore. Infant brought to mother's right breast in football hold in skin to skin. Infant not showing feeding cues, reluctant to latch to right breast. Breast sandwich performed and infant able to briefly latch to right breast with minimal non-nutritive sucking. Infant woken up to then placed at left breast in cross-cradle hold with similar result. Infant not showing feeding cues, reluctant to latch and fell asleep. Suggested implementing more routine pumping r/t infant's lack of stimulation to breasts with breast feeding attempts. Infant began to wake when I was putting him down in bassinet and suggested trying one more time with nipple shield at left breast in football hold. Small nipple shield applied to left breast and infant able to latch deeply to base of shield with two sucks then showed no interest in continue to try to feed, no feeding cues noted, unable to arouse for feeding with tactile stimulation. Infant fell asleep. Infant placed in bassinet and then I helped to set mother up to pump with bedside hospital grade pump with size 21 mm flanges on initiate setting. Mother states this feels comfortable however a bit of an \"overwhelming\" feeling came upon her. Mother ok to continue to pump. I suggested keeping the suction low " and discontinuing if she feels uncomfortable. Plan is to continue to try to latch infant at breast with/without nipple shield, then pump after.    Update at 1430    Called to room to assist with feed. Infant more awake, showing a few feeding cues. Placed infant in football hold at right breast with small nipple shield and pillow support. Infant able to latch and had some more effort with sustaining latch and sucks with and without tactile stimulation. After about 5 minutes or so, infant seemed to slow down and unlatch. Discussed with bedside RN/IBCLC and decided to give mother option for initiating supplemental nursing system (SNS) with donor breast milk to see if infant would be even more motivated to sustain latch with flow. Mother agreeable. SNS set up with 20 ml donor breast milk. Infant readily latched to right breast with nipple shield and SNS. Infant on and off sustained latch for about 10 minutes. When infant unlatched, mother wanted to try to get infant to latch without nipple shield. Infant would latch but then just suck on SNS and slip off breast/nipple. Nipple shield re-introduced after infant burped and sustained latch for another 5-10 minutes. Infant then unlatched and appeared content after feed. Mother plans to continue to feed with SNS/donor breast milk and/or bottle of donor breast milk. I encouraged to continue to pump after breastfeeding as well.

## 2024-07-07 NOTE — DISCHARGE SUMMARY
Postpartum Discharge Summary    Admission Date: 2024  Discharge Date: 24     Discharge Diagnosis  Problem List Items Addressed This Visit       Gestational diabetes mellitus (GDM) in third trimester ([facility])    Overview     1hr 154  3hr abnormal     MFM appt on   Currently well-controlled with diet changes  2024 : nutrition  2024 Nutrition plan alone   2024 Nutrition plan alone   2024 Nutrition plan alone   2024 Nutrition plan   2024 No change - Nutrition plan     2024 Nutrition plan    2024  Nutrition plan               Relevant Orders    Labor Induction (Completed)    Encounter for induction of labor ([facility]) - Primary    Relevant Orders    Referral to [facility] ([facility]     Other Visit Diagnoses       Third degree laceration of perineum, type 3b ([facility])        Relevant Orders    Referral to [facility] ([facility])             Rex Murphy is a 34 y.o. female now  and postpartum day 2      Pregnancy notable for:  GDMA1 - well controlled,    EFW 3026 31% AC 18%   GBS negative     Hospital Course  Admitted for term IOL  Delivery Date: 2024  11:58 PM   Delivery type: Vaginal, Spontaneous    GA at delivery: 39w1d  Outcome: Living   Anesthesia during delivery: Epidural   Intrapartum complications: None   Feeding method: Breastfeeding Status: Yes     Delivery complications: 3b laceration (see below)  Contraception at discharge: declines    Complications Requiring Follow-Up  3b Perineal Laceration   - s/p repair in OR  - laceration examined today - clean/dry/intact, healing well  - voiding and has had BM with minimal pain   - continue Miralax daily at home for 1-2 weeks, add colace as needed (pt will  OTC)  - pain well-controlled on Tylenol/Motrin (declines oxycodone)  - [facility] referral placed     GDMA1   - for 2hr GTT at postpartum visit     Elevated BP, no dx gHTN/PEC  - mild range BP x 1  postpartum  - asymptomatic     Pertinent Subjective and Physical Exam At Time of Discharge  Patient seen at bedside - doing well and no acute events overnight. Pain well-controlled on current regimen, lochia light, voiding spontaneously, passing flatus, ambulating independently, and tolerating PO.     General: well appearing, well-nourished, postpartum  Obstetric: abdomen soft/non-tender, fundus firm below umbilicus, lochia light, laceration clean/dry/intact, healing well, minimal vulvar swelling   Skin: No rashes/lesions/erythema  Neuro: A/Ox3, conversational  GI: +flatus  Respiratory: Even and unlabored on RA  Extremities: No edema, discoloration, or pain in BLE, equal and palpable DP and PT pulses    Psych: appropriate mood and affect     Discharge Meds     Your medication list        CONTINUE taking these medications        Instructions Last Dose Given Next Dose Due   blood-glucose meter misc  Commonly known as: Accu-Chek Guide Glucose Meter      Use for testing blood sugar during pregnancy       ergocalciferol 1.25 MG (62921 UT) capsule  Commonly known as: Vitamin D-2           famotidine 20 mg tablet  Commonly known as: Pepcid      Take 1 tablet (20 mg) by mouth once daily.       ferrous sulfate 325 (65 Fe) MG EC tablet      Take 1 tablet by mouth 2 times a day. Take 1 tablet twice daily every other day- with orange juice or other high citrus beverage or food       lancets misc  Commonly known as: Accu-Chek Softclix Lancets      Use 1 lancet 4-6 times per day during pregnancy              STOP taking these medications      Accu-Chek Guide test strips strip  Generic drug: blood sugar diagnostic        prenatal vitamin (iron-folic) 27 mg iron-800 mcg folic acid tablet                  Test Results Pending At Discharge  Pending Labs       Order Current Status    Surgical Pathology Exam - [facility] Collected (07/06/24 2436)            Outpatient Follow-Up  Future Appointments   Date Time Provider Department Center    8/15/2024 10:45 AM [id number]      order placed to schedule 4-6 weeks for routine postpartum visit  with Paul Ortiz CNM. Follow-up with [facility] clinic in 2 weeks (referral placed).     Iwona Verdugo PA-C  07/07/24 10:27 AM  [place]

## 2024-07-08 ENCOUNTER — LACTATION ENCOUNTER (OUTPATIENT)
Dept: NURSERY | Facility: HOSPITAL | Age: 34
End: 2024-07-08

## 2024-07-08 NOTE — LACTATION NOTE
This note was copied from a baby's chart.  Couplet getting discharged today, LC in room for a quick follow up. Baby asleep in crib. Parents have been independently feeding baby using a nipple shield and SNS with formula. Parents state that feeds are going well and that they have seen an improvement in baby's behavior / hunger cues / readiness to feed. Mom expresses concern that her milk is not in yet. Mom shares that she had a traumatic delivery and has not slept much since. LC explained that sometimes with a traumatic delivery it does take a few days for mom's milk to come in and also for baby to have the eagerness and capability of latching. LC encouraged parents to continue their plan of care but also encouraged mom to get some rest. LC shared that getting maternal rest is very important for adequate milk production. LC did tell parents that if mom needs to take a break and just pump and supplement infant to allow mom to sleep then this is ok. LC reviewed paced bottle feeding with parents and encouraged baby to be brought to breast every few hours.   LC encouraged parents to make an outpatient lactation appointment to see if nipple shield or SNS can be weaned.   Mom does have a pump for home.  LC encouraged parents to call out with any questions.

## 2024-07-09 ENCOUNTER — HOSPITAL ENCOUNTER (EMERGENCY)
Facility: HOSPITAL | Age: 34
Discharge: OTHER INSTITUTION | End: 2024-07-10
Attending: STUDENT IN AN ORGANIZED HEALTH CARE EDUCATION/TRAINING PROGRAM
Payer: COMMERCIAL

## 2024-07-09 ENCOUNTER — APPOINTMENT (OUTPATIENT)
Dept: RADIOLOGY | Facility: HOSPITAL | Age: 34
End: 2024-07-09
Payer: COMMERCIAL

## 2024-07-09 ENCOUNTER — APPOINTMENT (OUTPATIENT)
Dept: CARDIOLOGY | Facility: HOSPITAL | Age: 34
End: 2024-07-09
Payer: COMMERCIAL

## 2024-07-09 ENCOUNTER — HOSPITAL ENCOUNTER (INPATIENT)
Facility: HOSPITAL | Age: 34
End: 2024-07-09
Attending: STUDENT IN AN ORGANIZED HEALTH CARE EDUCATION/TRAINING PROGRAM | Admitting: STUDENT IN AN ORGANIZED HEALTH CARE EDUCATION/TRAINING PROGRAM
Payer: COMMERCIAL

## 2024-07-09 DIAGNOSIS — O15.9: Primary | ICD-10-CM

## 2024-07-09 LAB
ALBUMIN SERPL-MCNC: 3.2 G/DL (ref 3.5–5)
ALP BLD-CCNC: 111 U/L (ref 35–125)
ALT SERPL-CCNC: 46 U/L (ref 5–40)
ANION GAP SERPL CALC-SCNC: 12 MMOL/L
APPEARANCE UR: CLEAR
AST SERPL-CCNC: 58 U/L (ref 5–40)
BACTERIA #/AREA URNS AUTO: ABNORMAL /HPF
BASOPHILS # BLD AUTO: 0.03 X10*3/UL (ref 0–0.1)
BASOPHILS NFR BLD AUTO: 0.3 %
BILIRUB SERPL-MCNC: <0.2 MG/DL (ref 0.1–1.2)
BILIRUB UR STRIP.AUTO-MCNC: NEGATIVE MG/DL
BUN SERPL-MCNC: 13 MG/DL (ref 8–25)
CALCIUM SERPL-MCNC: 8.9 MG/DL (ref 8.5–10.4)
CHLORIDE SERPL-SCNC: 106 MMOL/L (ref 97–107)
CO2 SERPL-SCNC: 22 MMOL/L (ref 24–31)
COLOR UR: COLORLESS
CREAT SERPL-MCNC: 0.6 MG/DL (ref 0.4–1.6)
EGFRCR SERPLBLD CKD-EPI 2021: >90 ML/MIN/1.73M*2
EOSINOPHIL # BLD AUTO: 0.09 X10*3/UL (ref 0–0.7)
EOSINOPHIL NFR BLD AUTO: 0.9 %
ERYTHROCYTE [DISTWIDTH] IN BLOOD BY AUTOMATED COUNT: 15.7 % (ref 11.5–14.5)
GLUCOSE SERPL-MCNC: 91 MG/DL (ref 65–99)
GLUCOSE UR STRIP.AUTO-MCNC: NORMAL MG/DL
HCT VFR BLD AUTO: 31.7 % (ref 36–46)
HGB BLD-MCNC: 10.3 G/DL (ref 12–16)
IMM GRANULOCYTES # BLD AUTO: 0.07 X10*3/UL (ref 0–0.7)
IMM GRANULOCYTES NFR BLD AUTO: 0.7 % (ref 0–0.9)
KETONES UR STRIP.AUTO-MCNC: NEGATIVE MG/DL
LEUKOCYTE ESTERASE UR QL STRIP.AUTO: ABNORMAL
LYMPHOCYTES # BLD AUTO: 1.56 X10*3/UL (ref 1.2–4.8)
LYMPHOCYTES NFR BLD AUTO: 15.7 %
MAGNESIUM SERPL-MCNC: 1.9 MG/DL (ref 1.6–3.1)
MCH RBC QN AUTO: 29.1 PG (ref 26–34)
MCHC RBC AUTO-ENTMCNC: 32.5 G/DL (ref 32–36)
MCV RBC AUTO: 90 FL (ref 80–100)
MONOCYTES # BLD AUTO: 0.51 X10*3/UL (ref 0.1–1)
MONOCYTES NFR BLD AUTO: 5.1 %
NEUTROPHILS # BLD AUTO: 7.67 X10*3/UL (ref 1.2–7.7)
NEUTROPHILS NFR BLD AUTO: 77.3 %
NITRITE UR QL STRIP.AUTO: NEGATIVE
NRBC BLD-RTO: 0 /100 WBCS (ref 0–0)
NT-PROBNP SERPL-MCNC: 1173 PG/ML (ref 0–178)
PH UR STRIP.AUTO: 6 [PH]
PLATELET # BLD AUTO: 210 X10*3/UL (ref 150–450)
POTASSIUM SERPL-SCNC: 3.9 MMOL/L (ref 3.4–5.1)
PROT SERPL-MCNC: 6.1 G/DL (ref 5.9–7.9)
PROT UR STRIP.AUTO-MCNC: NEGATIVE MG/DL
RBC # BLD AUTO: 3.54 X10*6/UL (ref 4–5.2)
RBC # UR STRIP.AUTO: ABNORMAL /UL
RBC #/AREA URNS AUTO: ABNORMAL /HPF
SODIUM SERPL-SCNC: 140 MMOL/L (ref 133–145)
SP GR UR STRIP.AUTO: 1
UROBILINOGEN UR STRIP.AUTO-MCNC: NORMAL MG/DL
WBC # BLD AUTO: 9.9 X10*3/UL (ref 4.4–11.3)
WBC #/AREA URNS AUTO: ABNORMAL /HPF

## 2024-07-09 PROCEDURE — 81001 URINALYSIS AUTO W/SCOPE: CPT | Performed by: PHYSICIAN ASSISTANT

## 2024-07-09 PROCEDURE — 85025 COMPLETE CBC W/AUTO DIFF WBC: CPT | Performed by: PHYSICIAN ASSISTANT

## 2024-07-09 PROCEDURE — 96374 THER/PROPH/DIAG INJ IV PUSH: CPT | Mod: 59 | Performed by: STUDENT IN AN ORGANIZED HEALTH CARE EDUCATION/TRAINING PROGRAM

## 2024-07-09 PROCEDURE — 2500000004 HC RX 250 GENERAL PHARMACY W/ HCPCS (ALT 636 FOR OP/ED)

## 2024-07-09 PROCEDURE — 80053 COMPREHEN METABOLIC PANEL: CPT | Performed by: PHYSICIAN ASSISTANT

## 2024-07-09 PROCEDURE — 2500000004 HC RX 250 GENERAL PHARMACY W/ HCPCS (ALT 636 FOR OP/ED): Performed by: PHYSICIAN ASSISTANT

## 2024-07-09 PROCEDURE — 87086 URINE CULTURE/COLONY COUNT: CPT | Mod: WESLAB | Performed by: PHYSICIAN ASSISTANT

## 2024-07-09 PROCEDURE — 93005 ELECTROCARDIOGRAM TRACING: CPT

## 2024-07-09 PROCEDURE — 96375 TX/PRO/DX INJ NEW DRUG ADDON: CPT | Performed by: STUDENT IN AN ORGANIZED HEALTH CARE EDUCATION/TRAINING PROGRAM

## 2024-07-09 PROCEDURE — 83735 ASSAY OF MAGNESIUM: CPT | Performed by: PHYSICIAN ASSISTANT

## 2024-07-09 PROCEDURE — 71046 X-RAY EXAM CHEST 2 VIEWS: CPT | Performed by: RADIOLOGY

## 2024-07-09 PROCEDURE — 2500000004 HC RX 250 GENERAL PHARMACY W/ HCPCS (ALT 636 FOR OP/ED): Performed by: STUDENT IN AN ORGANIZED HEALTH CARE EDUCATION/TRAINING PROGRAM

## 2024-07-09 PROCEDURE — 96365 THER/PROPH/DIAG IV INF INIT: CPT | Performed by: STUDENT IN AN ORGANIZED HEALTH CARE EDUCATION/TRAINING PROGRAM

## 2024-07-09 PROCEDURE — 71046 X-RAY EXAM CHEST 2 VIEWS: CPT

## 2024-07-09 PROCEDURE — 36415 COLL VENOUS BLD VENIPUNCTURE: CPT | Performed by: PHYSICIAN ASSISTANT

## 2024-07-09 PROCEDURE — 83880 ASSAY OF NATRIURETIC PEPTIDE: CPT | Performed by: PHYSICIAN ASSISTANT

## 2024-07-09 PROCEDURE — 99285 EMERGENCY DEPT VISIT HI MDM: CPT | Mod: 25 | Performed by: STUDENT IN AN ORGANIZED HEALTH CARE EDUCATION/TRAINING PROGRAM

## 2024-07-09 RX ORDER — MAGNESIUM SULFATE HEPTAHYDRATE 40 MG/ML
2 INJECTION, SOLUTION INTRAVENOUS CONTINUOUS
Status: DISCONTINUED | OUTPATIENT
Start: 2024-07-09 | End: 2024-07-10 | Stop reason: HOSPADM

## 2024-07-09 RX ORDER — ONDANSETRON HYDROCHLORIDE 2 MG/ML
4 INJECTION, SOLUTION INTRAVENOUS ONCE
Status: COMPLETED | OUTPATIENT
Start: 2024-07-09 | End: 2024-07-09

## 2024-07-09 RX ORDER — CALCIUM GLUCONATE 20 MG/ML
1 INJECTION, SOLUTION INTRAVENOUS AS NEEDED
Status: DISCONTINUED | OUTPATIENT
Start: 2024-07-09 | End: 2024-07-10 | Stop reason: HOSPADM

## 2024-07-09 RX ORDER — LABETALOL HYDROCHLORIDE 5 MG/ML
20 INJECTION, SOLUTION INTRAVENOUS ONCE
Status: DISCONTINUED | OUTPATIENT
Start: 2024-07-09 | End: 2024-07-10 | Stop reason: HOSPADM

## 2024-07-09 RX ORDER — CEFTRIAXONE 1 G/50ML
1 INJECTION, SOLUTION INTRAVENOUS ONCE
Status: COMPLETED | OUTPATIENT
Start: 2024-07-09 | End: 2024-07-10

## 2024-07-09 RX ORDER — MIDAZOLAM HYDROCHLORIDE 5 MG/ML
INJECTION INTRAMUSCULAR; INTRAVENOUS
Status: COMPLETED
Start: 2024-07-09 | End: 2024-07-09

## 2024-07-09 ASSESSMENT — COLUMBIA-SUICIDE SEVERITY RATING SCALE - C-SSRS
2. HAVE YOU ACTUALLY HAD ANY THOUGHTS OF KILLING YOURSELF?: NO
1. IN THE PAST MONTH, HAVE YOU WISHED YOU WERE DEAD OR WISHED YOU COULD GO TO SLEEP AND NOT WAKE UP?: NO
6. HAVE YOU EVER DONE ANYTHING, STARTED TO DO ANYTHING, OR PREPARED TO DO ANYTHING TO END YOUR LIFE?: NO

## 2024-07-09 ASSESSMENT — PAIN - FUNCTIONAL ASSESSMENT: PAIN_FUNCTIONAL_ASSESSMENT: 0-10

## 2024-07-09 ASSESSMENT — LIFESTYLE VARIABLES
EVER FELT BAD OR GUILTY ABOUT YOUR DRINKING: NO
HAVE PEOPLE ANNOYED YOU BY CRITICIZING YOUR DRINKING: NO
HAVE YOU EVER FELT YOU SHOULD CUT DOWN ON YOUR DRINKING: NO
TOTAL SCORE: 0
EVER HAD A DRINK FIRST THING IN THE MORNING TO STEADY YOUR NERVES TO GET RID OF A HANGOVER: NO

## 2024-07-09 ASSESSMENT — PAIN SCALES - GENERAL: PAINLEVEL_OUTOF10: 0 - NO PAIN

## 2024-07-10 ENCOUNTER — APPOINTMENT (OUTPATIENT)
Dept: RADIOLOGY | Facility: HOSPITAL | Age: 34
End: 2024-07-10
Payer: COMMERCIAL

## 2024-07-10 ENCOUNTER — APPOINTMENT (OUTPATIENT)
Dept: CARDIOLOGY | Facility: HOSPITAL | Age: 34
End: 2024-07-10
Payer: COMMERCIAL

## 2024-07-10 ENCOUNTER — HOSPITAL ENCOUNTER (INPATIENT)
Facility: HOSPITAL | Age: 34
LOS: 1 days | Discharge: HOME | End: 2024-07-11
Attending: STUDENT IN AN ORGANIZED HEALTH CARE EDUCATION/TRAINING PROGRAM | Admitting: STUDENT IN AN ORGANIZED HEALTH CARE EDUCATION/TRAINING PROGRAM
Payer: COMMERCIAL

## 2024-07-10 VITALS
SYSTOLIC BLOOD PRESSURE: 119 MMHG | BODY MASS INDEX: 28.32 KG/M2 | DIASTOLIC BLOOD PRESSURE: 81 MMHG | WEIGHT: 150 LBS | HEIGHT: 61 IN | OXYGEN SATURATION: 100 % | TEMPERATURE: 98.2 F | RESPIRATION RATE: 14 BRPM | HEART RATE: 77 BPM

## 2024-07-10 DIAGNOSIS — O15.9: Primary | ICD-10-CM

## 2024-07-10 DIAGNOSIS — Z98.890 OTHER SPECIFIED POSTPROCEDURAL STATES: ICD-10-CM

## 2024-07-10 LAB
ALBUMIN SERPL BCP-MCNC: 3 G/DL (ref 3.4–5)
ALP SERPL-CCNC: 100 U/L (ref 33–110)
ALT SERPL W P-5'-P-CCNC: 39 U/L (ref 7–45)
ANION GAP SERPL CALC-SCNC: 12 MMOL/L (ref 10–20)
AORTIC VALVE PEAK VELOCITY: 1.6 M/S
AST SERPL W P-5'-P-CCNC: 43 U/L (ref 9–39)
AV PEAK GRADIENT: 10.2 MMHG
AVA (PEAK VEL): 1.53 CM2
BILIRUB SERPL-MCNC: 0.2 MG/DL (ref 0–1.2)
BNP SERPL-MCNC: 563 PG/ML (ref 0–99)
BUN SERPL-MCNC: 10 MG/DL (ref 6–23)
CALCIUM SERPL-MCNC: 7.8 MG/DL (ref 8.6–10.6)
CHLORIDE SERPL-SCNC: 106 MMOL/L (ref 98–107)
CO2 SERPL-SCNC: 25 MMOL/L (ref 21–32)
CREAT SERPL-MCNC: 0.51 MG/DL (ref 0.5–1.05)
EGFRCR SERPLBLD CKD-EPI 2021: >90 ML/MIN/1.73M*2
EJECTION FRACTION APICAL 4 CHAMBER: 60.3
EJECTION FRACTION: 59 %
ERYTHROCYTE [DISTWIDTH] IN BLOOD BY AUTOMATED COUNT: 15.7 % (ref 11.5–14.5)
GLUCOSE SERPL-MCNC: 94 MG/DL (ref 74–99)
HCT VFR BLD AUTO: 29.5 % (ref 36–46)
HGB BLD-MCNC: 9.6 G/DL (ref 12–16)
HOLD SPECIMEN: NORMAL
LEFT ATRIUM VOLUME AREA LENGTH INDEX BSA: 32 ML/M2
LEFT VENTRICLE INTERNAL DIMENSION DIASTOLE: 5 CM (ref 3.5–6)
LEFT VENTRICULAR OUTFLOW TRACT DIAMETER: 1.7 CM
MCH RBC QN AUTO: 29.1 PG (ref 26–34)
MCHC RBC AUTO-ENTMCNC: 32.5 G/DL (ref 32–36)
MCV RBC AUTO: 89 FL (ref 80–100)
MITRAL VALVE E/A RATIO: 2.14
NRBC BLD-RTO: 0 /100 WBCS (ref 0–0)
PLATELET # BLD AUTO: 212 X10*3/UL (ref 150–450)
POTASSIUM SERPL-SCNC: 3.4 MMOL/L (ref 3.5–5.3)
PROT SERPL-MCNC: 5.7 G/DL (ref 6.4–8.2)
RBC # BLD AUTO: 3.3 X10*6/UL (ref 4–5.2)
RIGHT VENTRICLE FREE WALL PEAK S': 13.2 CM/S
RIGHT VENTRICLE PEAK SYSTOLIC PRESSURE: 29.8 MMHG
SODIUM SERPL-SCNC: 140 MMOL/L (ref 136–145)
TRICUSPID ANNULAR PLANE SYSTOLIC EXCURSION: 2.4 CM
WBC # BLD AUTO: 10.2 X10*3/UL (ref 4.4–11.3)

## 2024-07-10 PROCEDURE — 70544 MR ANGIOGRAPHY HEAD W/O DYE: CPT

## 2024-07-10 PROCEDURE — 99232 SBSQ HOSP IP/OBS MODERATE 35: CPT | Performed by: STUDENT IN AN ORGANIZED HEALTH CARE EDUCATION/TRAINING PROGRAM

## 2024-07-10 PROCEDURE — 99199 UNLISTED SPECIAL SVC PX/RPRT: CPT

## 2024-07-10 PROCEDURE — 2500000004 HC RX 250 GENERAL PHARMACY W/ HCPCS (ALT 636 FOR OP/ED)

## 2024-07-10 PROCEDURE — 70544 MR ANGIOGRAPHY HEAD W/O DYE: CPT | Mod: 59

## 2024-07-10 PROCEDURE — 36415 COLL VENOUS BLD VENIPUNCTURE: CPT

## 2024-07-10 PROCEDURE — 80053 COMPREHEN METABOLIC PANEL: CPT

## 2024-07-10 PROCEDURE — 2500000002 HC RX 250 W HCPCS SELF ADMINISTERED DRUGS (ALT 637 FOR MEDICARE OP, ALT 636 FOR OP/ED)

## 2024-07-10 PROCEDURE — 99223 1ST HOSP IP/OBS HIGH 75: CPT | Performed by: STUDENT IN AN ORGANIZED HEALTH CARE EDUCATION/TRAINING PROGRAM

## 2024-07-10 PROCEDURE — 70551 MRI BRAIN STEM W/O DYE: CPT

## 2024-07-10 PROCEDURE — 2500000001 HC RX 250 WO HCPCS SELF ADMINISTERED DRUGS (ALT 637 FOR MEDICARE OP): Performed by: STUDENT IN AN ORGANIZED HEALTH CARE EDUCATION/TRAINING PROGRAM

## 2024-07-10 PROCEDURE — 2500000001 HC RX 250 WO HCPCS SELF ADMINISTERED DRUGS (ALT 637 FOR MEDICARE OP)

## 2024-07-10 PROCEDURE — 70551 MRI BRAIN STEM W/O DYE: CPT | Performed by: RADIOLOGY

## 2024-07-10 PROCEDURE — 83880 ASSAY OF NATRIURETIC PEPTIDE: CPT

## 2024-07-10 PROCEDURE — 1220000001 HC OB SEMI-PRIVATE ROOM DAILY

## 2024-07-10 PROCEDURE — 93306 TTE W/DOPPLER COMPLETE: CPT

## 2024-07-10 PROCEDURE — 85027 COMPLETE CBC AUTOMATED: CPT

## 2024-07-10 PROCEDURE — 2500000004 HC RX 250 GENERAL PHARMACY W/ HCPCS (ALT 636 FOR OP/ED): Performed by: STUDENT IN AN ORGANIZED HEALTH CARE EDUCATION/TRAINING PROGRAM

## 2024-07-10 PROCEDURE — 93306 TTE W/DOPPLER COMPLETE: CPT | Performed by: INTERNAL MEDICINE

## 2024-07-10 RX ORDER — LORAZEPAM 2 MG/ML
1 INJECTION INTRAMUSCULAR EVERY 6 HOURS PRN
Status: DISCONTINUED | OUTPATIENT
Start: 2024-07-10 | End: 2024-07-11 | Stop reason: HOSPADM

## 2024-07-10 RX ORDER — IBUPROFEN 600 MG/1
600 TABLET ORAL EVERY 6 HOURS PRN
Status: DISCONTINUED | OUTPATIENT
Start: 2024-07-10 | End: 2024-07-11 | Stop reason: HOSPADM

## 2024-07-10 RX ORDER — BISACODYL 10 MG/1
10 SUPPOSITORY RECTAL DAILY PRN
Status: DISCONTINUED | OUTPATIENT
Start: 2024-07-10 | End: 2024-07-11 | Stop reason: HOSPADM

## 2024-07-10 RX ORDER — METOCLOPRAMIDE 10 MG/1
10 TABLET ORAL EVERY 6 HOURS PRN
Status: DISCONTINUED | OUTPATIENT
Start: 2024-07-10 | End: 2024-07-11 | Stop reason: HOSPADM

## 2024-07-10 RX ORDER — NIFEDIPINE 10 MG/1
10 CAPSULE ORAL ONCE AS NEEDED
Status: DISCONTINUED | OUTPATIENT
Start: 2024-07-10 | End: 2024-07-11 | Stop reason: HOSPADM

## 2024-07-10 RX ORDER — TRANEXAMIC ACID 100 MG/ML
1000 INJECTION, SOLUTION INTRAVENOUS ONCE AS NEEDED
Status: DISCONTINUED | OUTPATIENT
Start: 2024-07-10 | End: 2024-07-11 | Stop reason: HOSPADM

## 2024-07-10 RX ORDER — POLYETHYLENE GLYCOL 3350 17 G/17G
17 POWDER, FOR SOLUTION ORAL 2 TIMES DAILY PRN
Status: DISCONTINUED | OUTPATIENT
Start: 2024-07-10 | End: 2024-07-11 | Stop reason: HOSPADM

## 2024-07-10 RX ORDER — HYDRALAZINE HYDROCHLORIDE 20 MG/ML
5 INJECTION INTRAMUSCULAR; INTRAVENOUS ONCE AS NEEDED
Status: DISCONTINUED | OUTPATIENT
Start: 2024-07-10 | End: 2024-07-11 | Stop reason: HOSPADM

## 2024-07-10 RX ORDER — ADHESIVE BANDAGE
10 BANDAGE TOPICAL
Status: DISCONTINUED | OUTPATIENT
Start: 2024-07-10 | End: 2024-07-11 | Stop reason: HOSPADM

## 2024-07-10 RX ORDER — MAGNESIUM SULFATE HEPTAHYDRATE 40 MG/ML
2 INJECTION, SOLUTION INTRAVENOUS CONTINUOUS
Status: DISCONTINUED | OUTPATIENT
Start: 2024-07-10 | End: 2024-07-11 | Stop reason: HOSPADM

## 2024-07-10 RX ORDER — SIMETHICONE 80 MG
80 TABLET,CHEWABLE ORAL 4 TIMES DAILY PRN
Status: DISCONTINUED | OUTPATIENT
Start: 2024-07-10 | End: 2024-07-11 | Stop reason: HOSPADM

## 2024-07-10 RX ORDER — POTASSIUM CHLORIDE 20 MEQ/1
40 TABLET, EXTENDED RELEASE ORAL ONCE
Status: COMPLETED | OUTPATIENT
Start: 2024-07-10 | End: 2024-07-10

## 2024-07-10 RX ORDER — ACETAMINOPHEN 325 MG/1
975 TABLET ORAL ONCE
Status: COMPLETED | OUTPATIENT
Start: 2024-07-10 | End: 2024-07-10

## 2024-07-10 RX ORDER — OXYTOCIN 10 [USP'U]/ML
10 INJECTION, SOLUTION INTRAMUSCULAR; INTRAVENOUS ONCE AS NEEDED
Status: DISCONTINUED | OUTPATIENT
Start: 2024-07-10 | End: 2024-07-11 | Stop reason: HOSPADM

## 2024-07-10 RX ORDER — LABETALOL HYDROCHLORIDE 5 MG/ML
20 INJECTION, SOLUTION INTRAVENOUS ONCE AS NEEDED
Status: DISCONTINUED | OUTPATIENT
Start: 2024-07-10 | End: 2024-07-11 | Stop reason: HOSPADM

## 2024-07-10 RX ORDER — SODIUM CHLORIDE, SODIUM LACTATE, POTASSIUM CHLORIDE, CALCIUM CHLORIDE 600; 310; 30; 20 MG/100ML; MG/100ML; MG/100ML; MG/100ML
125 INJECTION, SOLUTION INTRAVENOUS CONTINUOUS
Status: DISCONTINUED | OUTPATIENT
Start: 2024-07-10 | End: 2024-07-11 | Stop reason: HOSPADM

## 2024-07-10 RX ORDER — CARBOPROST TROMETHAMINE 250 UG/ML
250 INJECTION, SOLUTION INTRAMUSCULAR ONCE AS NEEDED
Status: DISCONTINUED | OUTPATIENT
Start: 2024-07-10 | End: 2024-07-11 | Stop reason: HOSPADM

## 2024-07-10 RX ORDER — ACETAMINOPHEN 325 MG/1
975 TABLET ORAL EVERY 6 HOURS PRN
Status: DISCONTINUED | OUTPATIENT
Start: 2024-07-10 | End: 2024-07-11 | Stop reason: HOSPADM

## 2024-07-10 RX ORDER — CALCIUM GLUCONATE 98 MG/ML
1 INJECTION, SOLUTION INTRAVENOUS ONCE AS NEEDED
Status: DISCONTINUED | OUTPATIENT
Start: 2024-07-10 | End: 2024-07-11 | Stop reason: HOSPADM

## 2024-07-10 RX ORDER — ONDANSETRON 4 MG/1
4 TABLET, FILM COATED ORAL EVERY 6 HOURS PRN
Status: DISCONTINUED | OUTPATIENT
Start: 2024-07-10 | End: 2024-07-11 | Stop reason: HOSPADM

## 2024-07-10 RX ORDER — LORAZEPAM 2 MG/ML
0.5 INJECTION INTRAMUSCULAR EVERY 6 HOURS PRN
Status: DISCONTINUED | OUTPATIENT
Start: 2024-07-10 | End: 2024-07-10

## 2024-07-10 RX ORDER — LOPERAMIDE HYDROCHLORIDE 2 MG/1
4 CAPSULE ORAL EVERY 2 HOUR PRN
Status: DISCONTINUED | OUTPATIENT
Start: 2024-07-10 | End: 2024-07-11 | Stop reason: HOSPADM

## 2024-07-10 RX ORDER — MISOPROSTOL 200 UG/1
800 TABLET ORAL ONCE AS NEEDED
Status: DISCONTINUED | OUTPATIENT
Start: 2024-07-10 | End: 2024-07-11 | Stop reason: HOSPADM

## 2024-07-10 RX ORDER — METOCLOPRAMIDE HYDROCHLORIDE 5 MG/ML
10 INJECTION INTRAMUSCULAR; INTRAVENOUS EVERY 6 HOURS PRN
Status: DISCONTINUED | OUTPATIENT
Start: 2024-07-10 | End: 2024-07-11 | Stop reason: HOSPADM

## 2024-07-10 RX ORDER — ONDANSETRON HYDROCHLORIDE 2 MG/ML
4 INJECTION, SOLUTION INTRAVENOUS EVERY 6 HOURS PRN
Status: DISCONTINUED | OUTPATIENT
Start: 2024-07-10 | End: 2024-07-11 | Stop reason: HOSPADM

## 2024-07-10 RX ORDER — METHYLERGONOVINE MALEATE 0.2 MG/ML
0.2 INJECTION INTRAVENOUS ONCE AS NEEDED
Status: DISCONTINUED | OUTPATIENT
Start: 2024-07-10 | End: 2024-07-11 | Stop reason: HOSPADM

## 2024-07-10 ASSESSMENT — PAIN DESCRIPTION - LOCATION: LOCATION: HEAD

## 2024-07-10 ASSESSMENT — PAIN SCALES - GENERAL
PAINLEVEL_OUTOF10: 0 - NO PAIN
PAINLEVEL_OUTOF10: 5 - MODERATE PAIN
PAINLEVEL_OUTOF10: 0 - NO PAIN
PAINLEVEL_OUTOF10: 4
PAINLEVEL_OUTOF10: 0 - NO PAIN
PAINLEVEL_OUTOF10: 6
PAINLEVEL_OUTOF10: 6
PAINLEVEL_OUTOF10: 0 - NO PAIN

## 2024-07-10 NOTE — CONSULTS
"Consults    History Of Present Illness  Rex Murphy is a 34 y.o. female with PMHx gestational diabetes presenting with HTN. Neurology consulted for RUE movements c/f seizure.    Patient reported episodes of RUE movements starting on 7/9: 1) wrist drops that caused her to drop baby bottle, 2) flailing movement that caused her to throw her phone. Each movement would repeat \"a couple of times\" during an episode. With the latter movements, there was one episode where patient \"felt like her face was melting\" and bit her tongue. Denied similar episodes prior to delivery.    Patient is also very concerned about her blood pressure: she reports that her baseline -110s, and she was -140 on admission. Patient is receiving Mg.    Past Medical History  Past Medical History:   Diagnosis Date    Syncope and collapse 09/02/2021    Near syncope     Surgical History  Past Surgical History:   Procedure Laterality Date    OTHER SURGICAL HISTORY  09/02/2021    No history of surgery     Social History  Social History     Tobacco Use    Smoking status: Never     Passive exposure: Never    Smokeless tobacco: Never   Vaping Use    Vaping status: Never Used   Substance Use Topics    Alcohol use: Never    Drug use: Never     Allergies  Amoxicillin and Cefazolin in dextrose (iso-os)  Medications Prior to Admission   Medication Sig Dispense Refill Last Dose    blood-glucose meter (Accu-Chek Guide Glucose Meter) misc Use for testing blood sugar during pregnancy 1 each 0 Past Month    lancets (Accu-Chek Softclix Lancets) misc Use 1 lancet 4-6 times per day during pregnancy 200 each 3 Past Month    famotidine (Pepcid) 20 mg tablet Take 1 tablet (20 mg) by mouth once daily. 30 tablet 1 Unknown    ferrous sulfate 325 (65 Fe) MG EC tablet Take 1 tablet by mouth 2 times a day. Take 1 tablet twice daily every other day- with orange juice or other high citrus beverage or food 60 tablet 11 Unknown     Last Recorded Vitals  Blood pressure " 111/67, pulse 73, temperature 36.6 °C (97.9 °F), temperature source Temporal, resp. rate 16, last menstrual period 10/05/2023, SpO2 96%, currently breastfeeding.    HEENT: small laceration on tip of tongue (R side)    Neurological Exam:  MENTAL STATUS:  General appearance: alert, mild anxiety  Orientation: person, place, time  Language: Expression, repetition, naming, comprehension intact  Follows complex commands across midline    CRANIAL NERVES:  - II:  Visual fields intact to confrontation bilaterally  - III, IV, VI: STEPHANY, EOMI to pursuit without nystagmus  - V: V1-V3 sensation intact bilaterally  - VII: Face muscles symmetric with smile and eye closure  - VIII: Intact to finger rub bilaterally  - IX, X: Palate elevated symmetrically bilaterally, no hoarseness  - XI: 5/5 strength on shoulder shrugging bilaterally  - XII: Tongue midline without atrophy or fasciculation    MOTOR: Tone and bulk normal in all extremities    STRENGTH: R L  Deltoid  5 5  Biceps  5 5  Triceps  5 5    NA 5 (refused exam on R hand 2/2 Mg infusion)    Hip flexion 5 5  Quadriceps 5 5  Hamstrings 5 5  DorsiFlex 5          5  PlantarFlex 5 5    REFLEXES:  R  L  Biceps   2+ 2+  Triceps   2+ 2+  Patellar   2+ 2+  Achilles 2+ 2+  Plantar  Down Down    COORDINATION: Intact on finger to nose bl    SENSORY: Intact to light touch in bl UE and LE    GAIT: deferred    Relevant Results  Results for orders placed or performed during the hospital encounter of 07/10/24 (from the past 24 hour(s))   CBC   Result Value Ref Range    WBC 10.2 4.4 - 11.3 x10*3/uL    nRBC 0.0 0.0 - 0.0 /100 WBCs    RBC 3.30 (L) 4.00 - 5.20 x10*6/uL    Hemoglobin 9.6 (L) 12.0 - 16.0 g/dL    Hematocrit 29.5 (L) 36.0 - 46.0 %    MCV 89 80 - 100 fL    MCH 29.1 26.0 - 34.0 pg    MCHC 32.5 32.0 - 36.0 g/dL    RDW 15.7 (H) 11.5 - 14.5 %    Platelets 212 150 - 450 x10*3/uL      I have personally reviewed the following imaging results   XR chest 2 views    Result Date:  2024  Interpreted By:  Jung Corrigan, STUDY: XR CHEST 2 VIEWS;  2024 10:14 pm   INDICATION: Signs/Symptoms:sob.   COMPARISON: None.   ACCESSION NUMBER(S): [id number]   ORDERING CLINICIAN: RISHI CRUZ   FINDINGS:         CARDIOMEDIASTINAL SILHOUETTE: Cardiomediastinal silhouette is normal in size and configuration.   LUNGS: There is blunting of the right costophrenic angle consistent with a small effusion. There is no consolidation. There is no edema.   ABDOMEN: No remarkable upper abdominal findings.   BONES: No acute osseous changes.       1.  Small right pleural effusion       MACRO: None   Signed by: Jung Corrigan 2024 10:21 PM Dictation workstation:   [id number]     OB follow UP transabdominal approach    Result Date: 2024  Interpreted by: Ehsan Garces Indication ======== Suspected Problem with Fetal Growth. Diabetes Mellitus, Gestational- Diet Controlled History ====== General History Smoking: no Height 155 cm Height (ft) 5 ft Height (in) 1 in Previous Outcomes  1 Para 0 Maternal Assessment ================= Height 155 cm Height (ft) 5 ft Height (in) 1 in Weight 59 kg Weight (lb) 131 lb Weight gain 0 kg Weight gain (lb) 0 lb BMI 24.75 kg/mï¿½ Physical Exam Initial weight (lb) 131 lb Pregnancy ========= Puga pregnancy. Number of fetuses: 1 Dating ====== LMP on: 10/5/2023 Cycle: Very certain LMP, regular cycle GA by LMP 38 w + 0 d VLADIMIR by LMP: 2024 Conception: LMP Ultrasound examination on: 2024 GA by U/S based upon: AC, BPD, Femur, HC GA by U/S 37 w + 3 d VLADIMIR by U/S: 7/15/2024 Assigned: based on the LMP, selected on 2023 Assigned GA 38 w + 0 d Assigned VLADIMIR: 2024 Fetal Growth Overview ================= Exam date        GA              BPD (mm)          HC (mm)              AC (mm)               FL (mm)             HL (mm)           EFW (g) 2024        19w 4d        42.7     23%        154.6    5%          133.9    23%         28    13%            27.1    6%        253     10% 03/14/2024        23w 0d        55.5     42%        199.7    9%          171.8    17%         38.5    19%                                488    15% 05/2/2024          30w 0d        80    92%           280.6     35%        246.7    16%        58     45%                                  1454    31% 05/30/2024        34w 0d        85.9     66%        309.9    29%        290.9     27%        65.2    30%                                2219    31% 06/27/2024        38w 0d        92.7     65%        338.5    51%        322.2     18%        71.8    23%                                3026    31% Impression ========= GDMA1 - IOL scheduled on 7/4/24 -Appropriate fetal growth and AFV -No malformations were identified on a limited survey -BPP is 8/8 Thank you allowing us to participate in the care of your patient General Evaluation ============== Cardiac activity present.  bpm. Fetal movements: visualized. Presentation: cephalic Placenta: Placental site: posterior Umbilical cord: Cord vessels: 3 vessel cord Amniotic fluid: Amount of AF: normal amount. MVP 4.2 cm. VERÓNICA 10.9 cm. Q1 4.1 cm, Q2 4.2 cm, Q3 1.2 cm, Q4 1.4 cm Fetal Biometry ============ Standard BPD 92.7 mm 37w 5d 65% Hadlock .4 mm  78% INTERGROWTH-21st .5 mm 38w 6d 51% Hadlock .2 mm 36w 1d 18% Hadlock Femur 71.8 mm 36w 5d 23% Hadlock HC / AC 1.05 EFW 3,026 g 37w 0d 31% Hadlock EFW (lb) 6 lb EFW (oz) 11 oz EFW by: Hadlock (BPD-HC-AC-FL) Head / Face / Neck Cephalic index 0.78  13% Nicolaides Extremities / Bony Struc FL / BPD 0.77 FL / HC 0.21 FL / AC 0.22 Other Structures  bpm Fetal Anatomy =========== Cranium: Normal Lateral ventricles: Normal Midline falx: Normal Cavum septi pellucidi: Normal Cerebellum: Normal Cisterna magna: Normal Head / Neck Rt lateral ventricle: Normal Lt lateral ventricle: Normal Thalami: Normal Cerebellar lobes: Normal 4-chamber view: Normal Heart / Thorax Cardiac axis: Normal  Diaphragm: Normal Stomach: Normal Kidneys: Normal Bladder: visualized Abdomen Stomach: correct situs Rt kidney: Normal Lt kidney: Normal Large bowel: Normal Wants to know fetal sex: no Biophysical Profile ============== 2: Fetal breathing movements 2: Gross body movements 2: Fetal tone 2: Amniotic fluid volume  Biophysical profile score Maternal Structures =============== Uterus / Cervix Uterus: Normal Cervix: Not visualized Ovaries / Tubes / Adnexa Rt ovary: Normal Lt ovary: Normal Method ====== Transabdominal ultrasound examination. View: Suboptimal view: limited by late gestational age Indication ======== Suspected Problem with Fetal Growth. Diabetes Mellitus, Gestational- Diet Controlled History ====== General History Smoking: no Height 155 cm Height (ft) 5 ft Height (in) 1 in Previous Outcomes  1 Para 0 Maternal Assessment ================= Height 155 cm Height (ft) 5 ft Height (in) 1 in Weight 59 kg Weight (lb) 131 lb Weight gain 0 kg Weight gain (lb) 0 lb BMI 24.75 kg/mï¿½ Physical Exam Initial weight (lb) 131 lb Pregnancy ========= Puga pregnancy. Number of fetuses: 1 Dating ====== LMP on: 10/5/2023 Cycle: Very certain LMP, regular cycle GA by LMP 38 w + 0 d VLADIMIR by LMP: 2024 Conception: LMP Ultrasound examination on: 2024 GA by U/S based upon: AC, BPD, Femur, HC GA by U/S 37 w + 3 d VLADIMIR by U/S: 7/15/2024 Assigned: based on the LMP, selected on 2023 Assigned GA 38 w + 0 d Assigned VLADIMIR: 2024 Fetal Growth Overview ================= Exam date        GA              BPD (mm)          HC (mm)              AC (mm)               FL (mm)             HL (mm)           EFW (g) 2024        19w 4d        42.7     23%        154.6    5%          133.9    23%         28    13%           27.1    6%        253     10% 2024        23w 0d        55.5     42%        199.7    9%          171.8    17%         38.5    19%                                488    15% 2024           30w 0d        80    92%           280.6     35%        246.7    16%        58     45%                                  1454    31% 05/30/2024        34w 0d        85.9     66%        309.9    29%        290.9     27%        65.2    30%                                2219    31% 06/27/2024        38w 0d        92.7     65%        338.5    51%        322.2     18%        71.8    23%                                3026    31% Impression ========= GDMA1 - IOL scheduled on 7/4/24 -Appropriate fetal growth and AFV -No malformations were identified on a limited survey -BPP is 8/8 Thank you allowing us to participate in the care of your patient General Evaluation ============== Cardiac activity present.  bpm. Fetal movements: visualized. Presentation: cephalic Placenta: Placental site: posterior Umbilical cord: Cord vessels: 3 vessel cord Amniotic fluid: Amount of AF: normal amount. MVP 4.2 cm. VERÓNICA 10.9 cm. Q1 4.1 cm, Q2 4.2 cm, Q3 1.2 cm, Q4 1.4 cm Fetal Biometry ============ Standard BPD 92.7 mm 37w 5d 65% Hadlock .4 mm  78% INTERGROWTH-21st .5 mm 38w 6d 51% Hadlock .2 mm 36w 1d 18% Hadlock Femur 71.8 mm 36w 5d 23% Hadlock HC / AC 1.05 EFW 3,026 g 37w 0d 31% Hadlock EFW (lb) 6 lb EFW (oz) 11 oz EFW by: Hadlock (BPD-HC-AC-FL) Head / Face / Neck Cephalic index 0.78  13% Nicolaides Extremities / Bony Struc FL / BPD 0.77 FL / HC 0.21 FL / AC 0.22 Other Structures  bpm Fetal Anatomy =========== Cranium: Normal Lateral ventricles: Normal Midline falx: Normal Cavum septi pellucidi: Normal Cerebellum: Normal Cisterna magna: Normal Head / Neck Rt lateral ventricle: Normal Lt lateral ventricle: Normal Thalami: Normal Cerebellar lobes: Normal 4-chamber view: Normal Heart / Thorax Cardiac axis: Normal Diaphragm: Normal Stomach: Normal Kidneys: Normal Bladder: visualized Abdomen Stomach: correct situs Rt kidney: Normal Lt kidney: Normal Large bowel: Normal Wants to know fetal sex: no Biophysical  Profile ============== 2: Fetal breathing movements 2: Gross body movements 2: Fetal tone 2: Amniotic fluid volume  Biophysical profile score Maternal Structures =============== Uterus / Cervix Uterus: Normal Cervix: Not visualized Ovaries / Tubes / Adnexa Rt ovary: Normal Lt ovary: Normal Method ====== Transabdominal ultrasound examination. View: Suboptimal view: limited by late gestational age    US fetal biophysical profile wo non stress testing    Result Date: 2024  Interpreted by: Ehsan Garces Indication ======== Suspected Problem with Fetal Growth. Diabetes Mellitus, Gestational- Diet Controlled History ====== General History Smoking: no Height 155 cm Height (ft) 5 ft Height (in) 1 in Previous Outcomes  1 Para 0 Maternal Assessment ================= Height 155 cm Height (ft) 5 ft Height (in) 1 in Weight 59 kg Weight (lb) 131 lb Weight gain 0 kg Weight gain (lb) 0 lb BMI 24.75 kg/mï¿½ Physical Exam Initial weight (lb) 131 lb Pregnancy ========= Puga pregnancy. Number of fetuses: 1 Dating ====== LMP on: 10/5/2023 Cycle: Very certain LMP, regular cycle GA by LMP 38 w + 0 d VLADIMIR by LMP: 2024 Conception: LMP Ultrasound examination on: 2024 GA by U/S based upon: AC, BPD, Femur, HC GA by U/S 37 w + 3 d VLADIMIR by U/S: 7/15/2024 Assigned: based on the LMP, selected on 2023 Assigned GA 38 w + 0 d Assigned VLADIMIR: 2024 Fetal Growth Overview ================= Exam date        GA              BPD (mm)          HC (mm)              AC (mm)               FL (mm)             HL (mm)           EFW (g) 2024        19w 4d        42.7     23%        154.6    5%          133.9    23%         28    13%           27.1    6%        253     10% 2024        23w 0d        55.5     42%        199.7    9%          171.8    17%         38.5    19%                                488    15% 2024          30w 0d        80    92%           280.6     35%        246.7    16%        58     45%                                   1454    31% 05/30/2024        34w 0d        85.9     66%        309.9    29%        290.9     27%        65.2    30%                                2219    31% 06/27/2024        38w 0d        92.7     65%        338.5    51%        322.2     18%        71.8    23%                                3026    31% Impression ========= GDMA1 - IOL scheduled on 7/4/24 -Appropriate fetal growth and AFV -No malformations were identified on a limited survey -BPP is 8/8 Thank you allowing us to participate in the care of your patient General Evaluation ============== Cardiac activity present.  bpm. Fetal movements: visualized. Presentation: cephalic Placenta: Placental site: posterior Umbilical cord: Cord vessels: 3 vessel cord Amniotic fluid: Amount of AF: normal amount. MVP 4.2 cm. VERÓNICA 10.9 cm. Q1 4.1 cm, Q2 4.2 cm, Q3 1.2 cm, Q4 1.4 cm Fetal Biometry ============ Standard BPD 92.7 mm 37w 5d 65% Hadlock .4 mm  78% INTERGROWTH-21st .5 mm 38w 6d 51% Hadlock .2 mm 36w 1d 18% Hadlock Femur 71.8 mm 36w 5d 23% Hadlock HC / AC 1.05 EFW 3,026 g 37w 0d 31% Hadlock EFW (lb) 6 lb EFW (oz) 11 oz EFW by: Hadlock (BPD-HC-AC-FL) Head / Face / Neck Cephalic index 0.78  13% Nicolaides Extremities / Bony Struc FL / BPD 0.77 FL / HC 0.21 FL / AC 0.22 Other Structures  bpm Fetal Anatomy =========== Cranium: Normal Lateral ventricles: Normal Midline falx: Normal Cavum septi pellucidi: Normal Cerebellum: Normal Cisterna magna: Normal Head / Neck Rt lateral ventricle: Normal Lt lateral ventricle: Normal Thalami: Normal Cerebellar lobes: Normal 4-chamber view: Normal Heart / Thorax Cardiac axis: Normal Diaphragm: Normal Stomach: Normal Kidneys: Normal Bladder: visualized Abdomen Stomach: correct situs Rt kidney: Normal Lt kidney: Normal Large bowel: Normal Wants to know fetal sex: no Biophysical Profile ============== 2: Fetal breathing movements 2: Gross body movements 2: Fetal tone 2:  Amniotic fluid volume  Biophysical profile score Maternal Structures =============== Uterus / Cervix Uterus: Normal Cervix: Not visualized Ovaries / Tubes / Adnexa Rt ovary: Normal Lt ovary: Normal Method ====== Transabdominal ultrasound examination. View: Suboptimal view: limited by late gestational age Indication ======== Suspected Problem with Fetal Growth. Diabetes Mellitus, Gestational- Diet Controlled History ====== General History Smoking: no Height 155 cm Height (ft) 5 ft Height (in) 1 in Previous Outcomes  1 Para 0 Maternal Assessment ================= Height 155 cm Height (ft) 5 ft Height (in) 1 in Weight 59 kg Weight (lb) 131 lb Weight gain 0 kg Weight gain (lb) 0 lb BMI 24.75 kg/mï¿½ Physical Exam Initial weight (lb) 131 lb Pregnancy ========= Puga pregnancy. Number of fetuses: 1 Dating ====== LMP on: 10/5/2023 Cycle: Very certain LMP, regular cycle GA by LMP 38 w + 0 d VLADIMIR by LMP: 2024 Conception: LMP Ultrasound examination on: 2024 GA by U/S based upon: AC, BPD, Femur, HC GA by U/S 37 w + 3 d VLADIMIR by U/S: 7/15/2024 Assigned: based on the LMP, selected on 2023 Assigned GA 38 w + 0 d Assigned VLADIMIR: 2024 Fetal Growth Overview ================= Exam date        GA              BPD (mm)          HC (mm)              AC (mm)               FL (mm)             HL (mm)           EFW (g) 2024        19w 4d        42.7     23%        154.6    5%          133.9    23%         28    13%           27.1    6%        253     10% 2024        23w 0d        55.5     42%        199.7    9%          171.8    17%         38.5    19%                                488    15% 2024          30w 0d        80    92%           280.6     35%        246.7    16%        58     45%                                  1454    31% 2024        34w 0d        85.9     66%        309.9    29%        290.9     27%        65.2    30%                                2219    31% 2024         38w 0d        92.7     65%        338.5    51%        322.2     18%        71.8    23%                                3026    31% Impression ========= GDMA1 - IOL scheduled on 7/4/24 -Appropriate fetal growth and AFV -No malformations were identified on a limited survey -BPP is 8/8 Thank you allowing us to participate in the care of your patient General Evaluation ============== Cardiac activity present.  bpm. Fetal movements: visualized. Presentation: cephalic Placenta: Placental site: posterior Umbilical cord: Cord vessels: 3 vessel cord Amniotic fluid: Amount of AF: normal amount. MVP 4.2 cm. VERÓNICA 10.9 cm. Q1 4.1 cm, Q2 4.2 cm, Q3 1.2 cm, Q4 1.4 cm Fetal Biometry ============ Standard BPD 92.7 mm 37w 5d 65% Hadlock .4 mm  78% INTERGROWTH-21st .5 mm 38w 6d 51% Hadlock .2 mm 36w 1d 18% Hadlock Femur 71.8 mm 36w 5d 23% Hadlock HC / AC 1.05 EFW 3,026 g 37w 0d 31% Hadlock EFW (lb) 6 lb EFW (oz) 11 oz EFW by: Hadlock (BPD-HC-AC-FL) Head / Face / Neck Cephalic index 0.78  13% Nicolaides Extremities / Bony Struc FL / BPD 0.77 FL / HC 0.21 FL / AC 0.22 Other Structures  bpm Fetal Anatomy =========== Cranium: Normal Lateral ventricles: Normal Midline falx: Normal Cavum septi pellucidi: Normal Cerebellum: Normal Cisterna magna: Normal Head / Neck Rt lateral ventricle: Normal Lt lateral ventricle: Normal Thalami: Normal Cerebellar lobes: Normal 4-chamber view: Normal Heart / Thorax Cardiac axis: Normal Diaphragm: Normal Stomach: Normal Kidneys: Normal Bladder: visualized Abdomen Stomach: correct situs Rt kidney: Normal Lt kidney: Normal Large bowel: Normal Wants to know fetal sex: no Biophysical Profile ============== 2: Fetal breathing movements 2: Gross body movements 2: Fetal tone 2: Amniotic fluid volume 8/8 Biophysical profile score Maternal Structures =============== Uterus / Cervix Uterus: Normal Cervix: Not visualized Ovaries / Tubes / Adnexa Rt ovary: Normal Lt ovary: Normal  Method ====== Transabdominal ultrasound examination. View: Suboptimal view: limited by late gestational age     Assessment/Plan   Principal Problem:    Eclampsia ([facility]-HCC)  Rex Murphy is a 34 y.o. female with PMHx gestational diabetes presenting with HTN. Neurology consulted for RUE movements c/f seizure. Lack of stereotypy in RUE movements suggest against seizure.    RECOMMENDATION  - no further neurological work-up    Will staff with attending in AM.    Nidhi Lopez MD  PGY-4 Neurology

## 2024-07-10 NOTE — PROGRESS NOTES
Postpartum Progress Note    Assessment/Plan   Rex Murphy is a 34 y.o., , who delivered at 39w1d gestation and is now postpartum day 5, admitted s/p suspected eclamptic seizure.    Suspected Eclampsia  - Patient without any hypertensive history, however had one mild range blood pressure postpartum. Transfer from [facility] for 45sec-1min seizure at OSH concerning for eclamptic seizure, resolved with 2mg Versed.  - Mg gtt started at OSH at 2315, continue x24 hrs for suspected eclamptic seizure  - has HA currently, will treat now with tylenol   - Neurology consulted to r/o alternate seizure disorder. Expressed concern for possible functional movement disorder over seizure, however rec MRI brain and EEG for evaluation  - MRI/A/V ordered, pending  - EEG ordered, pending. Per neurology can consider this outpatient  - HELLP labs notable for mild transaminitis: ALT/AST 46/58 > now nearly normalized 43/39 > will trend in AM. Labs otherwise wnl  - CXR at OSH with R pleural effusion. Pro-BNP elevated. Patient asymptomatic w/o SOB or desaturations. Repeat BNP on admission 563, will obtain echo    Postpartum Care  - s/p  on    - 3B laceration - cont tylenol/ibuprofen, bowel regimen while inpatient. ERAD referral placed  - breastfeeding + formula-feeding   - mood intermittently down/overwhelmed with significant birth trauma, consider SW/psych referral outpatient if patient is amenable     Seen and discussed with Dr. Ruiz Farnsworth MD  PGY-3, Obstetrics and Gynecology      Principal Problem:    Eclampsia ([facility])    Pregnancy Problems (from 23 to present)       Problem Noted Resolved    Eclampsia ([facility]) 7/10/2024 by Yoon Young MD No    Priority:  Medium      39 weeks gestation of pregnancy ([facility]) 2024 by Tanvi Silva MD No    Priority:  Medium      Encounter for induction of labor ([facility]) 2024 by AARON Gutierrez-CNSHARON, APRVERNELL-CNP No    Priority:  Medium      Anemia  affecting pregnancy in third trimester ([facility]) 4/19/2024 by DELICIA Gooden No    Priority:  Medium      Overview Addendum 6/28/2024 12:57 PM by DELICIA Collazo, APRN-CNP     4/18 10.0 Hgb Ferritin 26, started on PO fe BID  5/16 hgb = 11.2         Gestational diabetes mellitus (GDM) in third trimester ([facility]) 4/19/2024 by DLEICIA Gooden No    Priority:  Medium      Overview Addendum 6/25/2024  3:06 PM by ALEKS Bunch     1hr 154  3hr abnormal     MFM appt on 5/9  Currently well-controlled with diet changes  5/6/2024 : nutrition  5/14/2024 Nutrition plan alone   5/21/2024 Nutrition plan alone   5/28/2024 Nutrition plan alone   6/4/2024 Nutrition plan   6/11/2024 No change - Nutrition plan     6/18/2024 Nutrition plan    6/25/2024  Nutrition plan               Family history of neural tube defect 12/7/2023 by DELICIA Gooden No    Priority:  Medium      Overview Addendum 1/4/2024 12:26 PM by DELICIA Gooden     Unknown specifically FOB had spine surgery at day 1 of life in [facility]  Genetics consult placed and done see note         Decreased fetal movements in third trimester ([facility]) 5/17/2024 by DELICIA Gutierrez APRN-CNP 7/5/2024 by DELICIA Collazo, APRN-CNP    Uterine size-date discrepancy in third trimester ([facility]) 2/20/2024 by DELICIA Gooden 7/5/2024 by DELICIA Collazo APRN-CNP    Overview Addendum 5/3/2024  4:09 PM by Nevaeh Hardy MD     AC 23rd%tile EFW 10th%tile   Resolved!     4/26 US EFW 31%tile Ac16th%tile            Subjective   Patient feeling overwhelmed with her birth and postpartum course. Reports headache currently and some blurred vision. Denies other vision changes, chest pain, shortness of breath. Does feel like her legs are edematous.    Objective   Allergies:   Amoxicillin and Cefazolin in dextrose (iso-os)         Last Vitals:  Temp Pulse Resp BP MAP  Pulse Ox   36.4 °C (97.5 °F) 73 20 117/73   (!) 93 %     Vitals Min/Max Last 24 Hours:  Temp  Min: 36.3 °C (97.3 °F)  Max: 37 °C (98.6 °F)  Pulse  Min: 67  Max: 102  Resp  Min: 14  Max: 24  BP  Min: 96/60  Max: 135/68    Intake/Output:     Intake/Output Summary (Last 24 hours) at 7/10/2024 1611  Last data filed at 7/10/2024 1512  Gross per 24 hour   Intake 1451 ml   Output 1750 ml   Net -299 ml       Physical Exam:  General: Well appearing, alert  HEENT: normocephalic, EOMI  Cardio: RRR  Resp: CTAB  Neuro: DTRs2+  Extremities: no calf tenderness, mild LE edema   Psych: A&O x3, appropriate mood and affect    Lab Data:  Results from last 7 days   Lab Units 07/10/24  0613 07/09/24  2206 07/04/24  1152   WBC AUTO x10*3/uL 10.2 9.9 6.4   HEMOGLOBIN g/dL 9.6* 10.3* 11.3*   HEMATOCRIT % 29.5* 31.7* 33.9*   PLATELETS AUTO x10*3/uL 212 210 187   AST U/L 43* 58*  --    ALT U/L 39 46*  --    CREATININE mg/dL 0.51 0.60  --

## 2024-07-10 NOTE — ED PROVIDER NOTES
HPI   Chief Complaint   Patient presents with    Postpartum Complications     Pt is spasm ing swollen, right hand tremors       34-year-old female presented emergency department with a chief complaint of spasming of right hand, also complains of hypertension.  She is 4 days postpartum.  She describes uncomplicated pregnancy course.  She complains of some mild shortness of breath.  She denies any chest discomfort.  She is well-appearing nontoxic.  Not hypoxic.  Afebrile.  Denies injury or trauma.  Denies numbness or weakness.                          Lilia Coma Scale Score: 15                     Patient History   Past Medical History:   Diagnosis Date    Syncope and collapse 09/02/2021    Near syncope     Past Surgical History:   Procedure Laterality Date    OTHER SURGICAL HISTORY  09/02/2021    No history of surgery     Family History   Problem Relation Name Age of Onset    Asthma Mother      Hypertension Father       Social History     Tobacco Use    Smoking status: Never     Passive exposure: Never    Smokeless tobacco: Never   Vaping Use    Vaping status: Never Used   Substance Use Topics    Alcohol use: Never    Drug use: Never       Physical Exam   ED Triage Vitals [07/09/24 2148]   Temperature Heart Rate Respirations BP   36.8 °C (98.2 °F) 94 18 135/68      Pulse Ox Temp Source Heart Rate Source Patient Position   98 % Tympanic Monitor Sitting      BP Location FiO2 (%)     Left arm --       Physical Exam  Vitals and nursing note reviewed.   Constitutional:       Appearance: Normal appearance.   HENT:      Head: Normocephalic.      Nose: Nose normal.      Mouth/Throat:      Mouth: Mucous membranes are moist.   Cardiovascular:      Rate and Rhythm: Normal rate.      Pulses: Normal pulses.   Pulmonary:      Effort: Pulmonary effort is normal.   Abdominal:      General: Abdomen is flat.   Musculoskeletal:      Cervical back: Normal range of motion.      Comments: Bilateral lower extremity edema nonpitting    Skin:     General: Skin is warm.   Neurological:      General: No focal deficit present.      Mental Status: She is alert and oriented to person, place, and time.   Psychiatric:         Mood and Affect: Mood normal.         ED Course & MDM   ED Course as of 07/09/24 2251   Tue Jul 09, 2024 2158 EKG presented to me at 9:58 PM     EKG as interpreted by me shows sinus bradycardia with sinus arrhythmia at a ventricular rate of 53 bpm, normal axis, normal intervals, no STEMI.   [DH]      ED Course User Index  [DH] Arie Hopkins MD         Diagnoses as of 07/09/24 2251   Eclampsia ([facility])       Medical Decision Making  I have seen and evaluated this patient.  The attending physician has also seen and evaluated this patient.  Vital signs, laboratory testing and diagnostic images if applicable have been reviewed.  All laboratory and imaging is interpreted by myself unless otherwise stated.  Radiology studies are also formally interpreted by radiologist.      Labs Reviewed  CBC WITH AUTO DIFFERENTIAL - Abnormal     WBC                           9.9                    nRBC                          0.0                    RBC                           3.54 (*)               Hemoglobin                    10.3 (*)               Hematocrit                    31.7 (*)               MCV                           90                     MCH                           29.1                   MCHC                          32.5                   RDW                           15.7 (*)               Platelets                     210                    Neutrophils %                 77.3                   Immature Granulocytes %, Automated   0.7                    Lymphocytes %                 15.7                   Monocytes %                   5.1                    Eosinophils %                 0.9                    Basophils %                   0.3                    Neutrophils Absolute          7.67                   Immature  Granulocytes Absolute, Au*   0.07                   Lymphocytes Absolute          1.56                   Monocytes Absolute            0.51                   Eosinophils Absolute          0.09                   Basophils Absolute            0.03                URINALYSIS WITH REFLEX CULTURE AND MICROSCOPIC       Narrative: The following orders were created for panel order Urinalysis with Reflex Culture and Microscopic.                Procedure                               Abnormality         Status                                   ---------                               -----------         ------                                   Urinalysis with Reflex C...[[id number]]                      In process                               Extra Urine Gray Tube[[id number]]                            In process                                               Please view results for these tests on the individual orders.  COMPREHENSIVE METABOLIC PANEL  N-TERMINAL PROBNP  MAGNESIUM  URINALYSIS WITH REFLEX CULTURE AND MICROSCOPIC  EXTRA URINE GRAY TUBE  XR chest 2 views   Final Result    1.  Small right pleural effusion                      MACRO:    None          Signed by: Jung Corrigan 7/9/2024 10:21 PM    Dictation workstation:   [id number]     Medications - No data to display  New Prescriptions  No medications on file    I have seen and evaluated this patient and independently provided 31 minutes of nonconcurrent critical care time. This does not include separately billable procedures. Patient with high potential for deterioration, required frequent monitoring and assessment.        Procedure  Procedures     Jack Kennedy PA-C  07/09/24 2233       Jack Kennedy PA-C  07/09/24 2251       Jack Kennedy PA-C  07/09/24 2321

## 2024-07-10 NOTE — SIGNIFICANT EVENT
"Updated Neurology Recommendations:    Patient discussed and examined with attending this morning.    Per discussion with patient, she had 3 episodes of RUE movements that began on 7/9 PM.  See initial consult note for more details.  From her reenactment of the movements, they did not appear like typical clonic tonic (i.e. epileptic) or myoclonic movements.  She endorsed awareness of these movements (i.e. no loss of consciousness during these events).  Overall, her story and exam suggest a large functional overlay, suspected in the setting of increased stress from recent delivery.  However, she describes other symptoms such as right hemiface burning/\"face was melting\" sensation.  She also reported at some point yeah last night that she let out of scream and could not remember the episode.  Family at bedside reported that she was responsive and completely back to normal within 2 minutes of this episode.    Although leading differential for RUE abnormal movements is a functional movement disorder, would recommend routine EEG and MRI brain without contrast to definitively rule out seizures and structural lesion.    We had discussion with patient at bedside who inquired how long the workup would take.  She did express that if workup could not be completed by tomorrow/if her discharge had to be delayed due to these workup, she would be willing to have these test done outpatient.  We are agreeable to this.    Recommendations relayed to primary team.  "

## 2024-07-10 NOTE — H&P
Obstetrical Admission History and Physical     Rex Murphy is a 34 y.o.  who delivered 2024 by Vaginal, Spontaneous at 39w1d and is now PPD 5 from [facility], presenting after eclamptic seizure at OSH.     Chief Complaint: Postpartum Complications and Seizures (Eclampsia/)    Assessment/Plan    33yo  now PPD5 from  at 39.1wga presenting after eclamptic seizure at OSH.     Eclampsia  - 45-1min seizure at OSH, resolved with 2mg Versed.  - Mg gtt started at OSH, continue x24 hrs   - asymptomatic currently   - 1x mild range BP postpartum, otherwise normotensive at OSH and on arrival   - HELLP labs notable for mild transaminitis - ALT/AST 46/58, otherwise wnl. Repeat set pending on arrival   - CXR at OSH with R pleural effusion - currently asx, BNP ordered. Consider lasix +/- echo if elevated  - no focal deficits, plan for neuro consult to eval for further workup    Postpartum   - 3B laceration - cont tylenol/ibuprofen, bowel regimen while inpatient. ERAD referral placed  - breastfeeding/formula-feeding   - mood intermittently down/overwhelmed - consider SW vs psych consult if pt amenable, following acute stabilization     Seen and d/w Dr. Palmer Gilliam MD  PGY3, Obstetrics and Gynecology       Principal Problem:    Eclampsia ([facility])      Pregnancy Problems (from 23 to present)       Problem Noted Resolved    Eclampsia ([facility]) 7/10/2024 by Yoon Young MD No    Priority:  Medium      39 weeks gestation of pregnancy ([facility]) 2024 by Tanvi Silva MD No    Priority:  Medium      Encounter for induction of labor ([facility]) 2024 by DELICIA Gutierrez, APRN-CNP No    Priority:  Medium      Anemia affecting pregnancy in third trimester ([facility]) 2024 by AARON Gooden-RAYMUNDO No    Priority:  Medium      Overview Addendum 2024 12:57 PM by DELICIA Collazo, APRN-CNP      10.0 Hgb Ferritin 26, started on PO fe BID   hgb = 11.2          "Gestational diabetes mellitus (GDM) in third trimester ([facility]) 2024 by DELICIA Gooden No    Priority:  Medium      Overview Addendum 2024  3:06 PM by ALEKS Bunch     1hr 154  3hr abnormal     MFM appt on   Currently well-controlled with diet changes  2024 : nutrition  2024 Nutrition plan alone   2024 Nutrition plan alone   2024 Nutrition plan alone   2024 Nutrition plan   2024 No change - Nutrition plan     2024 Nutrition plan    2024  Nutrition plan               Family history of neural tube defect 2023 by DELICIA Gooden No    Priority:  Medium      Overview Addendum 2024 12:26 PM by DELICIA Gooden     Unknown specifically FOB had spine surgery at day 1 of life in [place]  Genetics consult placed and done see note         Decreased fetal movements in third trimester ([facility]) 2024 by DELICIA Gutierrez, LORENZA 2024 by DELICIA Collazo, AUGIECNP    Uterine size-date discrepancy in third trimester ([facility]) 2024 by DELICIA Gooden 2024 by DELICIA Collazo, APRN-CNP    Overview Addendum 5/3/2024  4:09 PM by Nevaeh Hardy MD     AC 23rd%tile EFW 10th%tile   Resolved!      US EFW 31%tile Ac16th%tile               Subjective   35yo  now PPD5 from  at 39.1wga presenting after eclamptic seizure at OSH.     Pt was discharged on , reports episode of R arm spasms q3-5 min  afternoon while feeding her baby. Intermittent spasms every 3-5min. Checked her blood pressure which was normotensive but elevated from her baseline (135/86).   Presented to ED for arm spasms, shortly after arrival had tonic-clonic seizure. Reports feeling like the R side of her body was \"melting\", and then blacking out. Per , pt's arms and legs were jerking, with R side of face twitching. Lasted 45 sec to 1min, broke with 2mg versed. Pt was then " postictal for ~5 min per . Reports biting her tongue with blood, uncertain if incontinent given she's had some urinary incontinence at baseline since delivery. Remained normotensive. Pt started on Mg bolus and transferred.     On arrival, pt reports feeling exhausted and sore, denies HA, VA changes, CP, SOB. Some discomfort in her R thigh.  Pt also reports significant swelling and discomfort since delivery throughout her arms and legs, symmetric, as well as intermittent chest heaviness immediately postpartum, now resolved. Reports having difficulty adjusting to physical discomfort and pain after delivery, feeling stressed about lactation and low milk supply as well as overall physical recovery.     Delivery/pregnancy notable for 3B laceration repair, GDMA1. Had 1x mild range blood pressure postpartum.        Obstetrical History   OB History    Para Term  AB Living   1 1 1     1   SAB IAB Ectopic Multiple Live Births         0 1      # Outcome Date GA Lbr Kay[place]/2nd Weight Sex Type Anes PTL Lv   1 Term 24 39w1d 31:32 / 04:13 3.21 kg M Vag-Spont EPI  ERROL       Past Medical History  Past Medical History:   Diagnosis Date    Syncope and collapse 2021    Near syncope        Past Surgical History   Past Surgical History:   Procedure Laterality Date    OTHER SURGICAL HISTORY  2021    No history of surgery       Social History  Social History     Tobacco Use    Smoking status: Never     Passive exposure: Never    Smokeless tobacco: Never   Substance Use Topics    Alcohol use: Never     Substance and Sexual Activity   Drug Use Never       Allergies  Amoxicillin and Cefazolin in dextrose (iso-os)     Medications  Medications Prior to Admission   Medication Sig Dispense Refill Last Dose    blood-glucose meter (Accu-Chek Guide Glucose Meter) misc Use for testing blood sugar during pregnancy 1 each 0 Past Month    lancets (Accu-Chek Softclix Lancets) misc Use 1 lancet 4-6 times per day during  pregnancy 200 each 3 Past Month    famotidine (Pepcid) 20 mg tablet Take 1 tablet (20 mg) by mouth once daily. 30 tablet 1 Unknown    ferrous sulfate 325 (65 Fe) MG EC tablet Take 1 tablet by mouth 2 times a day. Take 1 tablet twice daily every other day- with orange juice or other high citrus beverage or food 60 tablet 11 Unknown       Objective    Last Vitals  Temp Pulse Resp BP MAP O2 Sat   36.6 °C (97.9 °F) 69   121/71   96 %     Physical Examination  General: no acute distress  HEENT: normocephalic, atraumatic, PERRL, EOMI   Heart: warm and well perfused  Lungs: no increased WOB on RA  Abdomen: gravid, soft throughout, nontender  Extremities: moving all extremities, ROM intact. Symmetric swelling in bilateral lower extremities, nontender.  Neuro: awake and conversant, strength and motor grossly intact bilaterally   Psych: appropriate mood and affect     Lab Review  Lab Results   Component Value Date    WBC 9.9 07/09/2024    HGB 10.3 (L) 07/09/2024    HCT 31.7 (L) 07/09/2024    MCV 90 07/09/2024     07/09/2024      Lab Results   Component Value Date    GLUCOSE 91 07/09/2024    CALCIUM 8.9 07/09/2024     07/09/2024    K 3.9 07/09/2024    CO2 22 (L) 07/09/2024     07/09/2024    BUN 13 07/09/2024    CREATININE 0.60 07/09/2024

## 2024-07-10 NOTE — PROGRESS NOTES
Postpartum Progress Note    Assessment/Plan   Rex Murphy is a 34 y.o., , who delivered at 39w1d gestation and is now postpartum day 5, admitted s/p suspected eclamptic seizure.    Suspected Eclampsia  - Patient without any hypertensive history, however had one mild range blood pressure postpartum. Transfer from [facility] for 45sec-1min seizure at OSH concerning for eclamptic seizure, resolved with 2mg Versed.  - Mg gtt started at OSH at 2315, continue x24 hrs for suspected eclamptic seizure  - headache on arrival, now resolved. Asymptomatic.   - Neurology consulted to r/o alternate seizure disorder. Expressed concern for possible functional movement disorder over seizure, however rec MRI brain and EEG for evaluation  - MRI/A/V ordered, pending  - EEG ordered, pending. Per neurology can consider this outpatient  - HELLP labs notable for mild transaminitis: ALT/AST 46/58 > now improving to 43/39 > will trend in AM. Labs otherwise wnl  - CXR at OSH with R pleural effusion, BNP on admission 563. Echo wnl. Will defer lasix given asymptomatic, reassuring exam and vitals.     Left thigh pain  - suspect MSK given recent seizure, exam overall reassuring   - no erythema or swelling, distribution inconsistent with DVT (outer thigh)  - will monitor for improvement with ibuprofen, flexeril, heat packs. Consider duplex if remains unimproved.     Postpartum Care  - s/p  on    - 3B laceration - healing appropriately, cont tylenol/ibuprofen, bowel regimen while inpatient. ERAD referral placed.  - breastfeeding + formula-feeding   - mood intermittently down/overwhelmed with significant birth trauma, consider SW/psych referral outpatient if patient is amenable     Seen and discussed with Dr. Franklin Gilliam MD  PGY-3, Obstetrics and Gynecology      Principal Problem:    Eclampsia ([facility])    Pregnancy Problems (from 24 to present)       Problem Noted Resolved    Eclampsia ([facility]) 7/10/2024 by Yoon BUTTS  MD Hector No    Priority:  Medium      39 weeks gestation of pregnancy ([facility]) 5/31/2024 by Tanvi Silva MD No    Priority:  Medium      Encounter for induction of labor ([facility]) 5/16/2024 by DELICIA Gutierrez, APRN-CNP No    Priority:  Medium      Anemia affecting pregnancy in third trimester ([facility]) 4/19/2024 by DELICIA Gooden No    Priority:  Medium      Overview Addendum 6/28/2024 12:57 PM by DELICIA Collazo APRN-CNP     4/18 10.0 Hgb Ferritin 26, started on PO fe BID  5/16 hgb = 11.2         Gestational diabetes mellitus (GDM) in third trimester ([facility]) 4/19/2024 by DELICIA Gooden No    Priority:  Medium      Overview Addendum 6/25/2024  3:06 PM by ALEKS Bunch     1hr 154  3hr abnormal     MFM appt on 5/9  Currently well-controlled with diet changes  5/6/2024 : nutrition  5/14/2024 Nutrition plan alone   5/21/2024 Nutrition plan alone   5/28/2024 Nutrition plan alone   6/4/2024 Nutrition plan   6/11/2024 No change - Nutrition plan     6/18/2024 Nutrition plan    6/25/2024  Nutrition plan               Family history of neural tube defect 12/7/2023 by DELICIA Gooden No    Priority:  Medium      Overview Addendum 1/4/2024 12:26 PM by DELICIA Gooden     Unknown specifically FOB had spine surgery at day 1 of life in [place]  Genetics consult placed and done see note         Decreased fetal movements in third trimester ([facility]) 5/17/2024 by DELICIA Gutierrez APRN-CNP 7/5/2024 by DELICIA Collazo APRN-CNP    Uterine size-date discrepancy in third trimester ([facility]) 2/20/2024 by DELICIA Gooden 7/5/2024 by Lisset KING APRVERNELL-CNM, APRN-CNP    Overview Addendum 5/3/2024  4:09 PM by Nevaeh Hardy MD     AC 23rd%tile EFW 10th%tile   Resolved!     4/26  EFW 31%tile Ac16th%tile            Subjective   Feeling tired with Mag, reports headache resolved. Some  intermittent blurry vision, denies CP, SOB, RUQ Pain. Continued left quad/thigh pain.     Objective   Allergies:   Amoxicillin and Cefazolin in dextrose (iso-os)         Last Vitals:  Temp Pulse Resp BP MAP Pulse Ox   36.7 °C (98.1 °F) 80 20 109/69   97 %     Vitals Min/Max Last 24 Hours:  Temp  Min: 36.3 °C (97.3 °F)  Max: 37 °C (98.6 °F)  Pulse  Min: 67  Max: 102  Resp  Min: 14  Max: 24  BP  Min: 95/61  Max: 135/68    Intake/Output:     Intake/Output Summary (Last 24 hours) at 7/10/2024 1935  Last data filed at 7/10/2024 1827  Gross per 24 hour   Intake 1851 ml   Output 2450 ml   Net -599 ml       Physical Exam:  General: Well appearing, alert  HEENT: normocephalic, EOMI  Cardio: RRR  Resp: CTAB  Neuro: DTRs2+  Extremities: no calf tenderness, trace LE edema. Upper R thigh nonswollen, tender to touch over lateral aspect along muscle. No erythema noted.   Psych: A&O x3, appropriate mood and affect    Lab Data:  Results from last 7 days   Lab Units 07/10/24  0613 07/09/24  2206 07/04/24  1152   WBC AUTO x10*3/uL 10.2 9.9 6.4   HEMOGLOBIN g/dL 9.6* 10.3* 11.3*   HEMATOCRIT % 29.5* 31.7* 33.9*   PLATELETS AUTO x10*3/uL 212 210 187   AST U/L 43* 58*  --    ALT U/L 39 46*  --    CREATININE mg/dL 0.51 0.60  --

## 2024-07-10 NOTE — ED PROVIDER NOTES
ED Supervising Attending Note & Attestation   I was the Supervising Physician. I have seen face to face and examined the patient; reviewed history and physical, performed a substantive portion of the encounter, and discussed the medical decision making with the Medical Student, Resident, Fellow, PA and/or NP.     I personally saw the patient and made/approve the management plan and take responsibility for the patient management:     34-year-old female G1, P1 3 days postpartum presents emergency room with hand spasms as well as high blood pressure.  They note high blood pressures at home and is well as muscle spasms.  They deny any significant complications during birth and otherwise baby is doing well.  Patient denies any fevers, chills, nausea, vomiting, chest pain, shortness of breath, abdominal pain, diarrhea or constipation but does admit to some bilateral lower extremity swelling.    ED Triage Vitals [07/09/24 2148]   Temperature Heart Rate Respirations BP   36.8 °C (98.2 °F) 94 18 135/68      Pulse Ox Temp Source Heart Rate Source Patient Position   98 % Tympanic Monitor Sitting      BP Location FiO2 (%)     Left arm --       Vital signs reviewed: Afebrile, nontachycardic, normotensive, 98% on room air    Exam     Constitutional: No acute distress. Resting comfortably.   Head: Normocephalic, atraumatic.   Eyes: Pupils equal bilaterally, EOM grossly intact, conjunctiva normal.  Mouth/Throat: Oropharynx is clear, moist mucus membranes.   Neck: Supple. No lymphadenopathy.  Cardiovascular: Regular rate and regular rhythm. Extremities are well-perfused.   Pulmonary/Chest: No respiratory distress, breathing comfortably on room air.    Abdominal: Soft, non-tender, non-distended. No rebound or guarding.   Musculoskeletal: No lower extremity edema.       Skin: Warm, dry, and intact.   Neurological: Patient is oriented to person, place, time, and situation. Face symmetric, hearing intact to voice, speech normal. Moves  all extremities.     Differential includes but is not limited to:  Preeclampsia versus help syndrome versus a eclampsia versus hypertension    Labs: ordered.  Labs Reviewed   CBC WITH AUTO DIFFERENTIAL - Abnormal       Result Value    WBC 9.9      nRBC 0.0      RBC 3.54 (*)     Hemoglobin 10.3 (*)     Hematocrit 31.7 (*)     MCV 90      MCH 29.1      MCHC 32.5      RDW 15.7 (*)     Platelets 210      Neutrophils % 77.3      Immature Granulocytes %, Automated 0.7      Lymphocytes % 15.7      Monocytes % 5.1      Eosinophils % 0.9      Basophils % 0.3      Neutrophils Absolute 7.67      Immature Granulocytes Absolute, Automated 0.07      Lymphocytes Absolute 1.56      Monocytes Absolute 0.51      Eosinophils Absolute 0.09      Basophils Absolute 0.03     COMPREHENSIVE METABOLIC PANEL - Abnormal    Glucose 91      Sodium 140      Potassium 3.9      Chloride 106      Bicarbonate 22 (*)     Urea Nitrogen 13      Creatinine 0.60      eGFR >90      Calcium 8.9      Albumin 3.2 (*)     Alkaline Phosphatase 111      Total Protein 6.1      AST 58 (*)     Bilirubin, Total <0.2      ALT 46 (*)     Anion Gap 12     N-TERMINAL PROBNP - Abnormal    PROBNP 1,173 (*)     Narrative:     Reference ranges are based on clinical submission data. These ranges represent the 95th percentile of normal cut-off points. As NT Pro- BNP values approach 1000 pg/ml, clinical symptoms are more likely associated with CHF.   URINALYSIS WITH REFLEX CULTURE AND MICROSCOPIC - Abnormal    Color, Urine Colorless (*)     Appearance, Urine Clear      Specific Gravity, Urine 1.003 (*)     pH, Urine 6.0      Protein, Urine NEGATIVE      Glucose, Urine Normal      Blood, Urine 1.0 (3+) (*)     Ketones, Urine NEGATIVE      Bilirubin, Urine NEGATIVE      Urobilinogen, Urine Normal      Nitrite, Urine NEGATIVE      Leukocyte Esterase, Urine 250 Eva/µL (*)    MICROSCOPIC ONLY, URINE - Abnormal    WBC, Urine 11-20 (*)     RBC, Urine 1-2      Bacteria, Urine 1+ (*)     MAGNESIUM - Normal    Magnesium 1.90     URINE CULTURE   URINALYSIS WITH REFLEX CULTURE AND MICROSCOPIC    Narrative:     The following orders were created for panel order Urinalysis with Reflex Culture and Microscopic.  Procedure                               Abnormality         Status                     ---------                               -----------         ------                     Urinalysis with Reflex C...[[id number]]  Abnormal            Final result               Extra Urine Gray Tube[[id number]]                            In process                   Please view results for these tests on the individual orders.   EXTRA URINE GRAY TUBE       Radiology: ordered and independent interpretation performed.  Xray(s) chest x-rays interpreted by me shows no large pneumothorax at that time.    ECG/medicine tests: ordered and independent interpretation performed.  ED Course as of 07/09/24 2317 Tue Jul 09, 2024 2158 EKG presented to me at 9:58 PM     EKG as interpreted by me shows sinus bradycardia with sinus arrhythmia at a ventricular rate of 53 bpm, normal axis, normal intervals, no STEMI.   [DH]      ED Course User Index  [DH] Arie Hopkins MD         Diagnoses as of 07/09/24 2317   Eclampsia ([facility])     Patient had a seizure episode here in the emergency room, tonic-clonic lasting approximately 45 seconds to 1 minute.  Patient administered 2 mg of Versed IV after termination of seizure, patient is postictal at this time.  Patient started on 5 mg of magnesium IV and started on magnesium drip.    Lab work as interpreted by me shows no significant leukocytosis or anemia on CBC and otherwise comprehensive metabolic panel without electrolyte abnormality, SIMONA, and mild transaminitis.  Urinalysis shows evidence of urinary tract infection but otherwise without evidence of proteinuria.  Patient mag is 1.9.    Blood pressure at this time is 113/68 and patient initial triaging blood pressure 135/68.    Discussion  of management or test interpretation with external provider(s):  I personally discussed the patient's management with transfer center and Dr. Chakraborty, OB/GYN, who will accept patient at this time to transfer to [facility].    Patient discharged in stable state for transfer to [facility].    ED Medications managed:    Medications   labetaloL (Normodyne,Trandate) injection 20 mg ( intravenous Canceled Entry 7/9/24 8719)   magnesium sulfate 20 gram/500 mL (4 %) infusion (has no administration in time range)   calcium gluconate in NS IV 1 g (has no administration in time range)   cefTRIAXone (Rocephin) IVPB 1 g (has no administration in time range)   midazolam (Versed) injection  - Omnicell Override Pull (2 mg  Given 7/9/24 3021)   ondansetron (Zofran) injection 4 mg (4 mg intravenous Given 7/9/24 2304)       Arie Hopkins MD  11:17 PM    Attending Emergency Physician  [facility] EMERGENCY MEDICINE             Arie Hopkins MD  07/12/24 6533

## 2024-07-11 ENCOUNTER — APPOINTMENT (OUTPATIENT)
Dept: NEUROLOGY | Facility: HOSPITAL | Age: 34
End: 2024-07-11
Payer: COMMERCIAL

## 2024-07-11 ENCOUNTER — TELEPHONE (OUTPATIENT)
Dept: OBSTETRICS AND GYNECOLOGY | Facility: CLINIC | Age: 34
End: 2024-07-11
Payer: COMMERCIAL

## 2024-07-11 VITALS
SYSTOLIC BLOOD PRESSURE: 124 MMHG | TEMPERATURE: 98.2 F | HEART RATE: 69 BPM | RESPIRATION RATE: 19 BRPM | OXYGEN SATURATION: 96 % | DIASTOLIC BLOOD PRESSURE: 81 MMHG

## 2024-07-11 LAB
ALBUMIN SERPL BCP-MCNC: 3.1 G/DL (ref 3.4–5)
ALP SERPL-CCNC: 93 U/L (ref 33–110)
ALT SERPL W P-5'-P-CCNC: 26 U/L (ref 7–45)
ANION GAP SERPL CALC-SCNC: 14 MMOL/L (ref 10–20)
AST SERPL W P-5'-P-CCNC: 25 U/L (ref 9–39)
ATRIAL RATE: 53 BPM
BACTERIA UR CULT: NORMAL
BILIRUB SERPL-MCNC: 0.3 MG/DL (ref 0–1.2)
BUN SERPL-MCNC: 11 MG/DL (ref 6–23)
CALCIUM SERPL-MCNC: 7 MG/DL (ref 8.6–10.6)
CHLORIDE SERPL-SCNC: 108 MMOL/L (ref 98–107)
CO2 SERPL-SCNC: 21 MMOL/L (ref 21–32)
CREAT SERPL-MCNC: 0.55 MG/DL (ref 0.5–1.05)
EGFRCR SERPLBLD CKD-EPI 2021: >90 ML/MIN/1.73M*2
ERYTHROCYTE [DISTWIDTH] IN BLOOD BY AUTOMATED COUNT: 15.7 % (ref 11.5–14.5)
GLUCOSE SERPL-MCNC: 88 MG/DL (ref 74–99)
HCT VFR BLD AUTO: 34.2 % (ref 36–46)
HGB BLD-MCNC: 10.7 G/DL (ref 12–16)
MCH RBC QN AUTO: 29.6 PG (ref 26–34)
MCHC RBC AUTO-ENTMCNC: 31.3 G/DL (ref 32–36)
MCV RBC AUTO: 95 FL (ref 80–100)
NRBC BLD-RTO: 0 /100 WBCS (ref 0–0)
P AXIS: 23 DEGREES
P OFFSET: 192 MS
P ONSET: 143 MS
PLATELET # BLD AUTO: 210 X10*3/UL (ref 150–450)
POTASSIUM SERPL-SCNC: 4.2 MMOL/L (ref 3.5–5.3)
PR INTERVAL: 156 MS
PROT SERPL-MCNC: 6.2 G/DL (ref 6.4–8.2)
Q ONSET: 221 MS
QRS COUNT: 8 BEATS
QRS DURATION: 70 MS
QT INTERVAL: 400 MS
QTC CALCULATION(BAZETT): 375 MS
QTC FREDERICIA: 384 MS
R AXIS: 12 DEGREES
RBC # BLD AUTO: 3.61 X10*6/UL (ref 4–5.2)
SODIUM SERPL-SCNC: 139 MMOL/L (ref 136–145)
T AXIS: 41 DEGREES
T OFFSET: 421 MS
VENTRICULAR RATE: 53 BPM
WBC # BLD AUTO: 8.3 X10*3/UL (ref 4.4–11.3)

## 2024-07-11 PROCEDURE — 2500000001 HC RX 250 WO HCPCS SELF ADMINISTERED DRUGS (ALT 637 FOR MEDICARE OP): Performed by: STUDENT IN AN ORGANIZED HEALTH CARE EDUCATION/TRAINING PROGRAM

## 2024-07-11 PROCEDURE — 99238 HOSP IP/OBS DSCHRG MGMT 30/<: CPT

## 2024-07-11 PROCEDURE — 2500000005 HC RX 250 GENERAL PHARMACY W/O HCPCS: Performed by: STUDENT IN AN ORGANIZED HEALTH CARE EDUCATION/TRAINING PROGRAM

## 2024-07-11 PROCEDURE — 80053 COMPREHEN METABOLIC PANEL: CPT | Performed by: STUDENT IN AN ORGANIZED HEALTH CARE EDUCATION/TRAINING PROGRAM

## 2024-07-11 PROCEDURE — 2500000001 HC RX 250 WO HCPCS SELF ADMINISTERED DRUGS (ALT 637 FOR MEDICARE OP)

## 2024-07-11 PROCEDURE — 99222 1ST HOSP IP/OBS MODERATE 55: CPT | Performed by: STUDENT IN AN ORGANIZED HEALTH CARE EDUCATION/TRAINING PROGRAM

## 2024-07-11 PROCEDURE — 95819 EEG AWAKE AND ASLEEP: CPT

## 2024-07-11 PROCEDURE — 2500000004 HC RX 250 GENERAL PHARMACY W/ HCPCS (ALT 636 FOR OP/ED): Performed by: STUDENT IN AN ORGANIZED HEALTH CARE EDUCATION/TRAINING PROGRAM

## 2024-07-11 PROCEDURE — 85027 COMPLETE CBC AUTOMATED: CPT | Performed by: STUDENT IN AN ORGANIZED HEALTH CARE EDUCATION/TRAINING PROGRAM

## 2024-07-11 PROCEDURE — 36415 COLL VENOUS BLD VENIPUNCTURE: CPT | Performed by: STUDENT IN AN ORGANIZED HEALTH CARE EDUCATION/TRAINING PROGRAM

## 2024-07-11 RX ORDER — ADHESIVE BANDAGE
10 BANDAGE TOPICAL
Qty: 360 ML | Refills: 0 | Status: SHIPPED | OUTPATIENT
Start: 2024-07-11

## 2024-07-11 RX ORDER — POLYETHYLENE GLYCOL 3350 17 G/17G
17 POWDER, FOR SOLUTION ORAL 2 TIMES DAILY PRN
Qty: 30 EACH | Refills: 3 | Status: SHIPPED | OUTPATIENT
Start: 2024-07-11

## 2024-07-11 ASSESSMENT — PAIN SCALES - GENERAL: PAINLEVEL_OUTOF10: 6

## 2024-07-11 ASSESSMENT — PAIN DESCRIPTION - DESCRIPTORS: DESCRIPTORS: ACHING

## 2024-07-11 NOTE — CARE PLAN
Patient is progressing through goals. Vision changes have resolved, pain is well controlled, vital signs are stable, and lochia is light.

## 2024-07-11 NOTE — SIGNIFICANT EVENT
Patient meets criteria for home monitoring of blood pressure post discharge.  Reason: current eclampsia. Met with patient to assess for availability of home BP monitor.  Patient stated she owns home BP monitor. . Patient educated on importance of continuing to monitor BP at home, recording BP on home monitoring log and s/sx of when to call her provider.  Pt verbalized understanding the above information.

## 2024-07-11 NOTE — PROGRESS NOTES
Rex Murphy is a 34 y.o. female on day 1 of admission presenting with Eclampsia ([facility]).    Subjective/Overnight Events:   NAEO. Seen this AM and she feels much better compared to yesterday.     ---------------------------------------------------    Objective:   24 Hour Vitals  Temp:  [36 °C (96.8 °F)-37 °C (98.6 °F)] 36.8 °C (98.2 °F)  Heart Rate:  [69-91] 69  Resp:  [16-20] 19  BP: ()/(61-90) 124/81    Physical Exam   Awake, alert oriented x4  Speech fluent   Moving all extremities     24 hour Intake/Output    Intake/Output Summary (Last 24 hours) at 7/11/2024 1148  Last data filed at 7/10/2024 2238  Gross per 24 hour   Intake 1422.47 ml   Output 2400 ml   Net -977.53 ml       Labs  Results from last 72 hours   Lab Units 07/11/24  0738 07/10/24  0613 07/09/24  2206   WBC AUTO x10*3/uL 8.3 10.2 9.9   HEMOGLOBIN g/dL 10.7* 9.6* 10.3*      Results from last 72 hours   Lab Units 07/11/24  0738 07/10/24  0613 07/09/24  2206   SODIUM mmol/L 139 140 140   POTASSIUM mmol/L 4.2 3.4* 3.9   CHLORIDE mmol/L 108* 106 106   BUN mg/dL 11 10 13   CREATININE mg/dL 0.55 0.51 0.60   MAGNESIUM mg/dL  --   --  1.90        Medications   Scheduled medications     Continuous medications  lactated Ringer's, 125 mL/hr, Last Rate: 125 mL/hr (07/10/24 0417)  magnesium sulfate, 2 g/hr, Last Rate: 2 g/hr (07/10/24 2238)      PRN medications  PRN medications: acetaminophen, benzocaine-menthoL, bisacodyl, calcium gluconate, carboprost, hydrALAZINE, ibuprofen, labetaloL, lactated Ringer's, lactated Ringer's, loperamide, LORazepam, magnesium hydroxide, methylergonovine, metoclopramide **OR** metoclopramide, miSOPROStoL, NIFEdipine, ondansetron **OR** ondansetron, oxytocin, oxytocin, polyethylene glycol, psyllium, simethicone, tranexamic acid, witch hazel    Imaging:  MR brain wo IV contrast    Result Date: 7/11/2024  Interpreted By:  Dorian Martinez,  and Jw Muñoz STUDY: MR BRAIN WO IV CONTRAST; MR VENOGRAPHY INTRACRANIAL WO IV  CONTRAST; MR ANGIO HEAD WO IV CONTRAST;  7/10/2024 10:04 pm   INDICATION: Signs/Symptoms:c/f eclamptic seizure, headache.   COMPARISON: 07/25/2020   ACCESSION NUMBER(S): [id number]; [id number]; [id number]   ORDERING CLINICIAN: RAO HALE   TECHNIQUE: The brain was studied in the sagittal, axial and coronal planes utilizing FLAIR, T1 and T2 weighted images.   Time-of-flight MRA and MRV of the head was performed. The images were reviewed as source images and maximum intensity projections.   FINDINGS: MRI BRAIN: Parenchyma: There is no diffusion restriction abnormality to suggest acute infarct. There is no focal parenchymal signal abnormality. There is no mass effect or midline shift. Gradient echo T2 weighted images fail to demonstrate hemosiderin deposition or other evidence of hemorrhage. . Left frontal punctate cortical calcification again seen   CSF Spaces: The ventricles, sulci and basal cisterns are within normal limits.   Paranasal Sinuses and Mastoids: Visualized paranasal sinuses and mastoid air cells are unremarkable.   Orbits: Unremarkable appearance of the bilateral orbits.     MRA BRAIN: Anterior circulation: Mild irregularity along the proximal right anterior communicating artery with felt to be tortuous vessel. There is otherwise expected flow signal in bilateral intracranial internal carotid arteries, bilateral carotid terminals, bilateral proximal anterior and middle cerebral arteries. Posterior circulation: Bilateral intracranial vertebral arteries, vertebrobasilar junction, basilar artery and proximal posterior cerebral arteries demonstrate expected flow signal.   MRV BRAIN: Superior sagittal sinus: Normal. Torcula: Normal. Straight sinus:Normal. Internal cerebral veins/vein of Adriano: Normal. Transverse sinuses: Normal. Sigmoid sinuses: Normal. Proximal jugular veins: Normal.       1. There is no evidence of mass, infarction or hemorrhage. 2. Unremarkable MR a without evidence of stenosis or  aneurysm 3. Unremarkable MRV of the brain.   I personally reviewed the images/study and I agree with the findings as stated. This study was interpreted at [facility], [place], [place].   MACRO: None.   Signed by: Dorian Martinez 7/11/2024 3:25 AM Dictation workstation:   [id number]   No CT head results found for the past 14 days    Other Recent/Relevant Studies:  Transthoracic Echo (TTE) Complete    Result Date: 7/10/2024   [facility], [address] [place], [place] [address]                Tel [phone] and Fax [phone] TRANSTHORACIC ECHOCARDIOGRAM REPORT  Patient Name:      CRISTAL Garcia Physician:    [id number] Leslye Jacobson MD Study Date:        7/10/2024            Ordering Provider:    [id number] HALE MRN/PID:           [id number]             Fellow: Accession#:        [id number]         Nurse:                Sugey Alfaro RN Date of Birth/Age: 1990 / 34 years  Sonographer:          DILLON Jones RDCS Gender:            F                    Additional Staff: Height:            154.94 cm            Admit Date:           7/10/2024 Weight:            68.04 kg             Admission Status:     Inpatient -                                                               Routine BSA / BMI:         1.67 m2 / 28.34      Encounter#:           [id number]                    kg/m2 Blood Pressure:    95/61 mmHg           Department Location:  [place] [address] Study Type:    TRANSTHORACIC ECHO (TTE) COMPLETE Diagnosis/ICD: Other specified postprocedural states-Z98.890 Indication:    Post partum eclampsia CPT Code:      Echo  Complete w Full Doppler-[id number] Patient History: Pertinent History: Gestational DM, anemia, GERD, [place], post partum day 5 w/                    readmmit for eclampsia. Study Detail: The following Echo studies were performed: 2D, M-Mode, Doppler and               color flow. Technically challenging study due to prominent lung               artifact. Definity used as a contrast agent for endocardial border               definition. Total contrast used for this procedure was 2 mL via IV               push. The patient was awake.  PHYSICIAN INTERPRETATION: Left Ventricle: Left ventricular ejection fraction is normal, calculated by Anderson's biplane at 59%. There are no regional wall motion abnormalities. The left ventricular cavity size is normal. Spectral Doppler shows a normal pattern of left ventricular diastolic filling. Left Atrium: The left atrium is normal in size. Right Ventricle: The right ventricle is normal in size. There is normal right ventricular global systolic function. Right Atrium: The right atrium is normal in size. Aortic Valve: The aortic valve is trileaflet. There is no evidence of aortic valve regurgitation. The peak instantaneous gradient of the aortic valve is 10.2 mmHg. Mitral Valve: The mitral valve is normal in structure. There is mild to moderate mitral valve regurgitation which is posteriorly directed. Tricuspid Valve: The tricuspid valve is structurally normal. There is mild tricuspid regurgitation. The Doppler estimated RVSP is within normal limits at 29.8 mmHg. Pulmonic Valve: The pulmonic valve is structurally normal. There is mild to moderate pulmonic valve regurgitation. Pericardium: There is a trivial pericardial effusion. Aorta: The aortic root is normal. Systemic Veins: The inferior vena cava appears to be of normal size. There is IVC inspiratory collapse greater than 50%. In comparison to the previous echocardiogram(s): There are no prior studies on this patient for comparison  purposes. No prior echocardiogram available for comparison.  CONCLUSIONS:  1. Left ventricular ejection fraction is normal, calculated by Anderson's biplane at 59%.  2. There is normal right ventricular global systolic function.  3. Mild to moderate mitral valve regurgitation.  4. RVSP within normal limits.  5. No prior echocardiogram available for comparison. QUANTITATIVE DATA SUMMARY: 2D MEASUREMENTS:                          Normal Ranges: Ao Root d:     2.30 cm   (2.0-3.7cm) LAs:           4.10 cm   (2.7-4.0cm) IVSd:          0.70 cm   (0.6-1.1cm) LVPWd:         0.70 cm   (0.6-1.1cm) LVIDd:         5.00 cm   (3.9-5.9cm) LVIDs:         3.30 cm LV Mass Index: 68.6 g/m2 LV % FS        34.0 % LA VOLUME:                               Normal Ranges: LA Vol A4C:        56.7 ml    (22+/-6mL/m2) LA Vol A2C:        49.5 ml LA Vol BP:         53.6 ml LA Vol Index A4C:  33.9ml/m2 LA Vol Index A2C:  29.6 ml/m2 LA Vol Index BP:   32.0 ml/m2 LA Area A4C:       17.9 cm2 LA Area A2C:       16.9 cm2 LA Major Axis A4C: 4.8 cm LA Major Axis A2C: 4.9 cm RA VOLUME BY A/L METHOD:                               Normal Ranges: RA Vol A4C:        34.4 ml    (8.3-19.5ml) RA Vol Index A4C:  20.6 ml/m2 RA Area A4C:       13.5 cm2 RA Major Axis A4C: 4.5 cm AORTA MEASUREMENTS:                      Normal Ranges: Ao Sinus, d: 2.50 cm (2.1-3.5cm) Asc Ao, d:   2.60 cm (2.1-3.4cm) LV SYSTOLIC FUNCTION BY 2D PLANIMETRY (MOD):                      Normal Ranges: EF-A4C View:    60 % (>=55%) EF-A2C View:    58 % EF-Biplane:     59 % EF-3DQ:         56 % LV EF Reported: 59 % LV DIASTOLIC FUNCTION:                               Normal Ranges: MV Peak E:        1.24 m/s    (0.7-1.2 m/s) MV Peak A:        0.58 m/s    (0.42-0.7 m/s) E/A Ratio:        2.14        (1.0-2.2) MV e'             0.108 m/s   (>8.0) MV lateral e'     0.13 m/s MV medial e'      0.08 m/s MV A Dur:         111.00 msec E/e' Ratio:       11.49       (<8.0) PulmV Sys Rupert:    48.00 cm/s  "PulmV Beckwith Rupert:   75.00 cm/s PulmV S/D Rupert:    0.60 PulmV A Revs Rupert: 26.60 cm/s MITRAL VALVE:                 Normal Ranges: MV DT: 114 msec (150-240msec) AORTIC VALVE:                          Normal Ranges: AoV Vmax:      1.60 m/s  (<=1.7m/s) AoV Peak PG:   10.2 mmHg (<20mmHg) LVOT Max Rupert:  1.17 m/s  (<=1.1m/s) LVOT VTI:      25.80 cm LVOT Diameter: 1.70 cm   (1.8-2.4cm) AoV Area,Vmax: 1.53 cm2  (2.5-4.5cm2)  RIGHT VENTRICLE: RV Basal 3.40 cm RV Mid   2.50 cm RV Major 7.0 cm TAPSE:   23.6 mm RV s'    0.13 m/s TRICUSPID VALVE/RVSP:                             Normal Ranges: Peak TR Velocity: 2.59 m/s RV Syst Pressure: 29.8 mmHg (< 30mmHg) IVC Diam:         1.60 cm PULMONIC VALVE:                         Normal Ranges: PV Accel Time: 135 msec (>120ms) PV Max Rupert:    1.5 m/s  (0.6-0.9m/s) PV Max P.3 mmHg Pulmonary Veins: PulmV A Revs Rupert: 26.60 cm/s PulmV Beckwith Rupert:   75.00 cm/s PulmV S/D Rupert:    0.60 PulmV Sys Rupert:    48.00 cm/s  [id number] Leslye Jacobson MD Electronically signed on 7/10/2024 at 5:48:00 PM  ** Final **      Assessment/Plan   34 y.o. female with PMHx gestational diabetes presenting with HTN. Neurology consulted for RUE movements c/f seizure. Lack of stereotypy in RUE movements suggest against seizure; however, during evaluation with attending on 7/10, she describes other symptoms such as right hemiface burning/\"face was melting\" sensation. She also reported at some point yeah last night that she let out of scream and could not remember the episode. Family at bedside reported that she was responsive and completely back to normal within 2 minutes of this episode.     Overall, her story and exam suggest a large functional overlay in setting of recent increased stress after delivery- her reenactment of the movements, they did not appear like typical clonic tonic (i.e. epileptic) or myoclonic movements.     Routine EEG and MRI brain were both normal.    Neurology will sign off at this time.  She does not " need neurology follow up after discharge.    Amarilis Brantley MD  PGY-4 Neurology  Neurology [id number]

## 2024-07-11 NOTE — DISCHARGE SUMMARY
Discharge Summary    Admission Date: 7/10/2024  Discharge Date: 7/11/24    Discharge Diagnosis  Eclampsia ([facility])    Hospital Course  Delivery Date: 7/5/2024 11:58 PM  Delivery type: Vaginal, Spontaneous   GA at delivery: 39w1d  Outcome: Living  Anesthesia during delivery: Epidural  Intrapartum complications: None  Feeding method: Breastfeeding Status: Yes    [facility] witnessed seizure in [facility]. Previously without any hypertensive or preeclampsia diagnosis, however this event was most consistent with eclamptic seizure. On arrival, lab work up was notable for elevated BNP, echo was normal. HELLP labs otherwise notable for slight transaminitis which resolved during admission. Neurology was consulted to evaluate for alternate seizure disorder and has low suspicion for this. MRI/A/V were negative, EEG was also normal. On Hospital day 2, patient remained normotensive with only 1 mild range BP (also had 1 postpartum), her lab abnormalities resolved, and she was asymptomatic. She was stable for discharge with follow up for BP check, postpartum visit, and ERAD clinic visit to be scheduled.      Pertinent Physical Exam At Time of Discharge  General: Well appearing, alert  HEENT: normocephalic, EOMI  Cardio: RRR  Resp: CTAB  Neuro: DTRs2+  Extremities: no calf tenderness  Psych: A&O x3, appropriate mood and affect    Discharge Meds     Your medication list        START taking these medications        Instructions Last Dose Given Next Dose Due   magnesium hydroxide 400 mg/5 mL suspension  Commonly known as: Milk of Magnesia      Take 10 mL by mouth every 24 (twenty four) hours if needed for constipation.       polyethylene glycol 17 gram packet  Commonly known as: Glycolax, Miralax      Take 17 g by mouth 2 times a day as needed (first line).              CONTINUE taking these medications        Instructions Last Dose Given Next Dose Due   blood-glucose meter  misc  Commonly known as: Accu-Chek Guide Glucose Meter      Use for testing blood sugar during pregnancy       famotidine 20 mg tablet  Commonly known as: Pepcid      Take 1 tablet (20 mg) by mouth once daily.       ferrous sulfate 325 (65 Fe) MG EC tablet      Take 1 tablet by mouth 2 times a day. Take 1 tablet twice daily every other day- with orange juice or other high citrus beverage or food       lancets misc  Commonly known as: Accu-Chek Softclix Lancets      Use 1 lancet 4-6 times per day during pregnancy                 Where to Get Your Medications        These medications were sent to [facility]  - [place], [place] - [address]  [address] [place] [address] [place] [address]      Phone: [phone]   magnesium hydroxide 400 mg/5 mL suspension  polyethylene glycol 17 gram packet          Complications Requiring Follow-Up  3b laceration    Test Results Pending At Discharge  Pending Labs       No current pending labs.            Outpatient Follow-Up  Future Appointments   Date Time Provider Department Center   7/18/2024  9:30 AM Tanvi Silva MD [id number] [facility]   7/18/2024 10:30 AM Tahmina Mahoney MD MPH [id number] [facility]   8/15/2024 10:45 AM AARON Gooden-RONNIEM [id number] [facility]           Yamilet Farnsworth MD

## 2024-07-11 NOTE — TELEPHONE ENCOUNTER
RN reached pt for follow up appts scheduled following in pt readmit pp for exclampsia. Discharged today.   ERAD follow up and  per Dr. Cottrell to be scheduled same day with OB provider for early pp visit and BP check.  PT is scheduled ERAD and pp provider visit same day- I called her on her way home from the hospital and reviewed scheduling of follow up.

## 2024-07-12 ENCOUNTER — HOSPITAL ENCOUNTER (INPATIENT)
Facility: HOSPITAL | Age: 34
LOS: 1 days | Discharge: HOME | End: 2024-07-13
Attending: OBSTETRICS & GYNECOLOGY | Admitting: OBSTETRICS & GYNECOLOGY
Payer: COMMERCIAL

## 2024-07-12 ENCOUNTER — APPOINTMENT (OUTPATIENT)
Dept: CARDIOLOGY | Facility: HOSPITAL | Age: 34
End: 2024-07-12
Payer: COMMERCIAL

## 2024-07-12 ENCOUNTER — PATIENT OUTREACH (OUTPATIENT)
Dept: CARE COORDINATION | Facility: CLINIC | Age: 34
End: 2024-07-12
Payer: COMMERCIAL

## 2024-07-12 DIAGNOSIS — Z98.890 POSTOPERATIVE STATE: Primary | ICD-10-CM

## 2024-07-12 DIAGNOSIS — O14.10 SEVERE PRE-ECLAMPSIA, ANTEPARTUM (HHS-HCC): ICD-10-CM

## 2024-07-12 DIAGNOSIS — O15.9: ICD-10-CM

## 2024-07-12 LAB
ALBUMIN SERPL BCP-MCNC: 3.7 G/DL (ref 3.4–5)
ALP SERPL-CCNC: 117 U/L (ref 33–110)
ALT SERPL W P-5'-P-CCNC: 46 U/L (ref 7–45)
ANION GAP SERPL CALC-SCNC: 16 MMOL/L (ref 10–20)
AST SERPL W P-5'-P-CCNC: 42 U/L (ref 9–39)
BILIRUB SERPL-MCNC: 0.3 MG/DL (ref 0–1.2)
BNP SERPL-MCNC: 518 PG/ML (ref 0–99)
BUN SERPL-MCNC: 11 MG/DL (ref 6–23)
CALCIUM SERPL-MCNC: 8.8 MG/DL (ref 8.6–10.6)
CHLORIDE SERPL-SCNC: 107 MMOL/L (ref 98–107)
CO2 SERPL-SCNC: 22 MMOL/L (ref 21–32)
CREAT SERPL-MCNC: 0.57 MG/DL (ref 0.5–1.05)
EGFRCR SERPLBLD CKD-EPI 2021: >90 ML/MIN/1.73M*2
ERYTHROCYTE [DISTWIDTH] IN BLOOD BY AUTOMATED COUNT: 15.4 % (ref 11.5–14.5)
GLUCOSE SERPL-MCNC: 92 MG/DL (ref 74–99)
HCT VFR BLD AUTO: 34.2 % (ref 36–46)
HGB BLD-MCNC: 10.9 G/DL (ref 12–16)
MCH RBC QN AUTO: 28.6 PG (ref 26–34)
MCHC RBC AUTO-ENTMCNC: 31.9 G/DL (ref 32–36)
MCV RBC AUTO: 90 FL (ref 80–100)
NRBC BLD-RTO: 0 /100 WBCS (ref 0–0)
PLATELET # BLD AUTO: 242 X10*3/UL (ref 150–450)
POTASSIUM SERPL-SCNC: 4.5 MMOL/L (ref 3.5–5.3)
PROT SERPL-MCNC: 6.5 G/DL (ref 6.4–8.2)
RBC # BLD AUTO: 3.81 X10*6/UL (ref 4–5.2)
SODIUM SERPL-SCNC: 140 MMOL/L (ref 136–145)
WBC # BLD AUTO: 7.8 X10*3/UL (ref 4.4–11.3)

## 2024-07-12 PROCEDURE — 2500000004 HC RX 250 GENERAL PHARMACY W/ HCPCS (ALT 636 FOR OP/ED): Performed by: STUDENT IN AN ORGANIZED HEALTH CARE EDUCATION/TRAINING PROGRAM

## 2024-07-12 PROCEDURE — 99222 1ST HOSP IP/OBS MODERATE 55: CPT | Performed by: STUDENT IN AN ORGANIZED HEALTH CARE EDUCATION/TRAINING PROGRAM

## 2024-07-12 PROCEDURE — 1120000001 HC OB PRIVATE ROOM DAILY

## 2024-07-12 PROCEDURE — 99213 OFFICE O/P EST LOW 20 MIN: CPT

## 2024-07-12 PROCEDURE — 86900 BLOOD TYPING SEROLOGIC ABO: CPT | Performed by: STUDENT IN AN ORGANIZED HEALTH CARE EDUCATION/TRAINING PROGRAM

## 2024-07-12 PROCEDURE — 85027 COMPLETE CBC AUTOMATED: CPT | Performed by: STUDENT IN AN ORGANIZED HEALTH CARE EDUCATION/TRAINING PROGRAM

## 2024-07-12 PROCEDURE — 2500000002 HC RX 250 W HCPCS SELF ADMINISTERED DRUGS (ALT 637 FOR MEDICARE OP, ALT 636 FOR OP/ED): Performed by: STUDENT IN AN ORGANIZED HEALTH CARE EDUCATION/TRAINING PROGRAM

## 2024-07-12 PROCEDURE — 83880 ASSAY OF NATRIURETIC PEPTIDE: CPT | Performed by: STUDENT IN AN ORGANIZED HEALTH CARE EDUCATION/TRAINING PROGRAM

## 2024-07-12 PROCEDURE — 80053 COMPREHEN METABOLIC PANEL: CPT | Performed by: STUDENT IN AN ORGANIZED HEALTH CARE EDUCATION/TRAINING PROGRAM

## 2024-07-12 PROCEDURE — 2500000001 HC RX 250 WO HCPCS SELF ADMINISTERED DRUGS (ALT 637 FOR MEDICARE OP): Performed by: STUDENT IN AN ORGANIZED HEALTH CARE EDUCATION/TRAINING PROGRAM

## 2024-07-12 PROCEDURE — 93005 ELECTROCARDIOGRAM TRACING: CPT

## 2024-07-12 RX ORDER — HYDRALAZINE HYDROCHLORIDE 20 MG/ML
5 INJECTION INTRAMUSCULAR; INTRAVENOUS ONCE AS NEEDED
Status: COMPLETED | OUTPATIENT
Start: 2024-07-12 | End: 2024-07-12

## 2024-07-12 RX ORDER — NIFEDIPINE 10 MG/1
10 CAPSULE ORAL ONCE AS NEEDED
Status: DISCONTINUED | OUTPATIENT
Start: 2024-07-12 | End: 2024-07-13 | Stop reason: HOSPADM

## 2024-07-12 RX ORDER — ACETAMINOPHEN 325 MG/1
975 TABLET ORAL EVERY 6 HOURS PRN
Status: DISCONTINUED | OUTPATIENT
Start: 2024-07-12 | End: 2024-07-13 | Stop reason: HOSPADM

## 2024-07-12 RX ORDER — IBUPROFEN 600 MG/1
600 TABLET ORAL EVERY 6 HOURS PRN
Status: DISCONTINUED | OUTPATIENT
Start: 2024-07-12 | End: 2024-07-13 | Stop reason: HOSPADM

## 2024-07-12 RX ORDER — LOPERAMIDE HYDROCHLORIDE 2 MG/1
4 CAPSULE ORAL EVERY 2 HOUR PRN
Status: DISCONTINUED | OUTPATIENT
Start: 2024-07-12 | End: 2024-07-13 | Stop reason: HOSPADM

## 2024-07-12 RX ORDER — OXYTOCIN 10 [USP'U]/ML
10 INJECTION, SOLUTION INTRAMUSCULAR; INTRAVENOUS ONCE AS NEEDED
Status: DISCONTINUED | OUTPATIENT
Start: 2024-07-12 | End: 2024-07-13 | Stop reason: HOSPADM

## 2024-07-12 RX ORDER — LABETALOL HYDROCHLORIDE 5 MG/ML
20 INJECTION, SOLUTION INTRAVENOUS ONCE AS NEEDED
Status: DISCONTINUED | OUTPATIENT
Start: 2024-07-12 | End: 2024-07-13 | Stop reason: HOSPADM

## 2024-07-12 RX ORDER — METOCLOPRAMIDE HYDROCHLORIDE 5 MG/ML
10 INJECTION INTRAMUSCULAR; INTRAVENOUS EVERY 6 HOURS PRN
Status: DISCONTINUED | OUTPATIENT
Start: 2024-07-12 | End: 2024-07-13 | Stop reason: HOSPADM

## 2024-07-12 RX ORDER — FUROSEMIDE 10 MG/ML
20 INJECTION INTRAMUSCULAR; INTRAVENOUS ONCE
Status: COMPLETED | OUTPATIENT
Start: 2024-07-12 | End: 2024-07-12

## 2024-07-12 RX ORDER — ONDANSETRON 4 MG/1
4 TABLET, FILM COATED ORAL EVERY 6 HOURS PRN
Status: DISCONTINUED | OUTPATIENT
Start: 2024-07-12 | End: 2024-07-13 | Stop reason: HOSPADM

## 2024-07-12 RX ORDER — TRANEXAMIC ACID 100 MG/ML
1000 INJECTION, SOLUTION INTRAVENOUS ONCE AS NEEDED
Status: DISCONTINUED | OUTPATIENT
Start: 2024-07-12 | End: 2024-07-13 | Stop reason: HOSPADM

## 2024-07-12 RX ORDER — SIMETHICONE 80 MG
80 TABLET,CHEWABLE ORAL 4 TIMES DAILY PRN
Status: DISCONTINUED | OUTPATIENT
Start: 2024-07-12 | End: 2024-07-13 | Stop reason: HOSPADM

## 2024-07-12 RX ORDER — NIFEDIPINE 30 MG/1
30 TABLET, FILM COATED, EXTENDED RELEASE ORAL EVERY 24 HOURS
Status: DISCONTINUED | OUTPATIENT
Start: 2024-07-12 | End: 2024-07-13 | Stop reason: HOSPADM

## 2024-07-12 RX ORDER — METHYLERGONOVINE MALEATE 0.2 MG/ML
0.2 INJECTION INTRAVENOUS ONCE AS NEEDED
Status: DISCONTINUED | OUTPATIENT
Start: 2024-07-12 | End: 2024-07-13 | Stop reason: HOSPADM

## 2024-07-12 RX ORDER — ADHESIVE BANDAGE
10 BANDAGE TOPICAL
Status: DISCONTINUED | OUTPATIENT
Start: 2024-07-12 | End: 2024-07-13 | Stop reason: HOSPADM

## 2024-07-12 RX ORDER — CARBOPROST TROMETHAMINE 250 UG/ML
250 INJECTION, SOLUTION INTRAMUSCULAR ONCE AS NEEDED
Status: DISCONTINUED | OUTPATIENT
Start: 2024-07-12 | End: 2024-07-13 | Stop reason: HOSPADM

## 2024-07-12 RX ORDER — BISACODYL 10 MG/1
10 SUPPOSITORY RECTAL DAILY PRN
Status: DISCONTINUED | OUTPATIENT
Start: 2024-07-12 | End: 2024-07-13 | Stop reason: HOSPADM

## 2024-07-12 RX ORDER — ONDANSETRON HYDROCHLORIDE 2 MG/ML
4 INJECTION, SOLUTION INTRAVENOUS EVERY 6 HOURS PRN
Status: DISCONTINUED | OUTPATIENT
Start: 2024-07-12 | End: 2024-07-13 | Stop reason: HOSPADM

## 2024-07-12 RX ORDER — FAMOTIDINE 20 MG/1
20 TABLET, FILM COATED ORAL DAILY
Status: DISCONTINUED | OUTPATIENT
Start: 2024-07-13 | End: 2024-07-13 | Stop reason: DRUGHIGH

## 2024-07-12 RX ORDER — METOCLOPRAMIDE 10 MG/1
10 TABLET ORAL EVERY 6 HOURS PRN
Status: DISCONTINUED | OUTPATIENT
Start: 2024-07-12 | End: 2024-07-13 | Stop reason: HOSPADM

## 2024-07-12 RX ORDER — MISOPROSTOL 200 UG/1
800 TABLET ORAL ONCE AS NEEDED
Status: DISCONTINUED | OUTPATIENT
Start: 2024-07-12 | End: 2024-07-13 | Stop reason: HOSPADM

## 2024-07-12 RX ORDER — POLYETHYLENE GLYCOL 3350 17 G/17G
17 POWDER, FOR SOLUTION ORAL 2 TIMES DAILY PRN
Status: DISCONTINUED | OUTPATIENT
Start: 2024-07-12 | End: 2024-07-13 | Stop reason: HOSPADM

## 2024-07-12 SDOH — SOCIAL STABILITY: SOCIAL INSECURITY: DO YOU FEEL ANYONE HAS EXPLOITED OR TAKEN ADVANTAGE OF YOU FINANCIALLY OR OF YOUR PERSONAL PROPERTY?: NO

## 2024-07-12 SDOH — SOCIAL STABILITY: SOCIAL INSECURITY: ABUSE SCREEN: ADULT

## 2024-07-12 SDOH — SOCIAL STABILITY: SOCIAL INSECURITY: HAS ANYONE EVER THREATENED TO HURT YOUR FAMILY OR YOUR PETS?: NO

## 2024-07-12 SDOH — ECONOMIC STABILITY: GENERAL: WOULD YOU LIKE HELP WITH ANY OF THE FOLLOWING NEEDS?: I DONT NEED HELP WITH ANY OF THESE

## 2024-07-12 SDOH — SOCIAL STABILITY: SOCIAL INSECURITY: PHYSICAL ABUSE: DENIES

## 2024-07-12 SDOH — SOCIAL STABILITY: SOCIAL INSECURITY: ARE THERE ANY APPARENT SIGNS OF INJURIES/BEHAVIORS THAT COULD BE RELATED TO ABUSE/NEGLECT?: NO

## 2024-07-12 SDOH — ECONOMIC STABILITY: FOOD INSECURITY
ARE ANY OF YOUR NEEDS URGENT? FOR EXAMPLE, UNCERTAINTY OF WHERE YOU WILL GET YOUR NEXT MEAL OR NOT HAVING THE MEDICATIONS YOU NEED TO TAKE TOMORROW.: NO

## 2024-07-12 SDOH — SOCIAL STABILITY: SOCIAL INSECURITY: VERBAL ABUSE: DENIES

## 2024-07-12 SDOH — HEALTH STABILITY: MENTAL HEALTH: WERE YOU ABLE TO COMPLETE ALL THE BEHAVIORAL HEALTH SCREENINGS?: YES

## 2024-07-12 SDOH — SOCIAL STABILITY: SOCIAL INSECURITY: HAVE YOU HAD THOUGHTS OF HARMING ANYONE ELSE?: NO

## 2024-07-12 SDOH — SOCIAL STABILITY: SOCIAL INSECURITY: ARE YOU OR HAVE YOU BEEN THREATENED OR ABUSED PHYSICALLY, EMOTIONALLY, OR SEXUALLY BY ANYONE?: NO

## 2024-07-12 SDOH — ECONOMIC STABILITY: HOUSING INSECURITY: DO YOU FEEL UNSAFE GOING BACK TO THE PLACE WHERE YOU ARE LIVING?: NO

## 2024-07-12 SDOH — HEALTH STABILITY: MENTAL HEALTH: WISH TO BE DEAD (PAST 1 MONTH): NO

## 2024-07-12 SDOH — HEALTH STABILITY: MENTAL HEALTH: NON-SPECIFIC ACTIVE SUICIDAL THOUGHTS (PAST 1 MONTH): NO

## 2024-07-12 SDOH — HEALTH STABILITY: MENTAL HEALTH: SUICIDAL BEHAVIOR (LIFETIME): NO

## 2024-07-12 SDOH — SOCIAL STABILITY: SOCIAL INSECURITY: DOES ANYONE TRY TO KEEP YOU FROM HAVING/CONTACTING OTHER FRIENDS OR DOING THINGS OUTSIDE YOUR HOME?: NO

## 2024-07-12 ASSESSMENT — LIFESTYLE VARIABLES
AUDIT-C TOTAL SCORE: 0
HOW OFTEN DO YOU HAVE 6 OR MORE DRINKS ON ONE OCCASION: NEVER
HOW OFTEN DO YOU HAVE A DRINK CONTAINING ALCOHOL: NEVER
HOW MANY STANDARD DRINKS CONTAINING ALCOHOL DO YOU HAVE ON A TYPICAL DAY: PATIENT DOES NOT DRINK
SKIP TO QUESTIONS 9-10: 1
AUDIT-C TOTAL SCORE: 0

## 2024-07-12 ASSESSMENT — ACTIVITIES OF DAILY LIVING (ADL): LACK_OF_TRANSPORTATION: NO

## 2024-07-12 ASSESSMENT — PATIENT HEALTH QUESTIONNAIRE - PHQ9
1. LITTLE INTEREST OR PLEASURE IN DOING THINGS: NOT AT ALL
2. FEELING DOWN, DEPRESSED OR HOPELESS: NOT AT ALL
SUM OF ALL RESPONSES TO PHQ9 QUESTIONS 1 & 2: 0

## 2024-07-12 ASSESSMENT — PAIN SCALES - GENERAL: PAINLEVEL_OUTOF10: 0 - NO PAIN

## 2024-07-12 NOTE — PROGRESS NOTES
Outreach call to patient to support a smooth transition of care from recent admission.    Discharge facility: [facility]  Discharge diagnosis: Eclampsia   Admission date: 7/10/24   Discharge date:  7/11/24    PCP Appointment Date: needs new PCP  Specialist Appointment Date:  JEREL 7/18/24  Recommended follow-up lab work per discharge instructions:    Spoke with patient, reviewed discharge medications, discharge instructions, assessed social needs, and provided education on importance of follow-up appointment with provider.     See  Hospital Encounter and Summary, and Discharge assessment below for further details. Information obtained from discharge summary from EMR.    Hospital Course  Delivery Date: 7/5/2024 11:58 PM  Delivery type: Vaginal, Spontaneous   GA at delivery: 39w1d  Outcome: Living  Anesthesia during delivery: Epidural  Intrapartum complications: None  Feeding method: Breastfeeding Status: Yes     Patient was admitted as transfer from [facility] on 7/10 for witnessed seizure in [facility]. Previously without any hypertensive or preeclampsia diagnosis, however this event was most consistent with eclamptic seizure. On arrival, lab work up was notable for elevated BNP, echo was normal. HELLP labs otherwise notable for slight transaminitis which resolved during admission. Neurology was consulted to evaluate for alternate seizure disorder and has low suspicion for this. MRI/A/V were negative, EEG was also normal. On Hospital day 2, patient remained normotensive with only 1 mild range BP (also had 1 postpartum), her lab abnormalities resolved, and she was asymptomatic. She was stable for discharge with follow up for BP check, postpartum visit, and ERAD clinic visit to be scheduled.     Engagement  Call Start Time: 1331 (7/12/2024  1:59 PM)    Medications  Medications reviewed with patient/caregiver?: Yes (7/12/2024  1:59 PM)  Does the patient have all medications ordered at discharge?: Not  applicable (7/12/2024  1:59 PM)  Is the patient taking all medications as directed (includes completed medication regime)?: Yes (7/12/2024  1:59 PM)    Appointments  Does the patient have a primary care provider?: No (Patient reports that she has moved and not seen Dr. Pantoja in a long time.) (7/12/2024  1:59 PM)  Care Management Interventions: Verified appointment date/time/provider (Patient has follow-up appointment with OBGYN on 7/18/24) (7/12/2024  1:59 PM)  Has the patient kept scheduled appointments due by today?: Not applicable (7/12/2024  1:59 PM)  Follow Up Tasks: PCP needed (7/12/2024  1:59 PM)    Patient Teaching  Does the patient have access to their discharge instructions?: Yes (7/12/2024  1:59 PM)  Care Management Interventions: Reviewed instructions with patient (7/12/2024  1:59 PM)  What is the patient's perception of their health status since discharge?: Worsening (7/12/2024  1:59 PM)  Is the patient/caregiver able to teach back the hierarchy of who to call/visit for symptoms/problems? PCP, Specialist, Home Health nurse, Urgent Care, ED, 911: Yes (7/12/2024  1:59 PM)    Wrap Up  Wrap Up Additional Comments: Patient had magnesium in the hospital. Her blurry vision has improved. Patient reports she is feeling weak, and felt weaker yesterday. Patient reported that she is having pitting edema in her feet. Swelling goes down when she props her feet and wears compression socks. Then returns quickly upon standing. Patient reports that she noticed the swelling prior to her seizure and is concerned that something is happening. She has also noticed some shortness of breath with conversations.Patient also reported 2 episodes of fever. Stated that she told hospital staff prior to discharge that she felt febrile, was check and was WNL. Patient went home and she was 102 F. She took Tylenol. Afebrile today. Patient is monitoring her blood pressures, and has been in the 120/80s. Patient is breastfeeding. CM reached  out to OBGYN, and she will call patient. (7/12/2024  1:59 PM)  Call End Time: 1355 (7/12/2024  1:59 PM)      Enrolled patient in [employer] for additional support and education through transition period.  Will continue to monitor through transition period.    Provided patient with my contact information for potential needs.    Yadira Alvares RN BSN  [phone]

## 2024-07-13 VITALS
BODY MASS INDEX: 28.64 KG/M2 | HEART RATE: 66 BPM | TEMPERATURE: 97.5 F | SYSTOLIC BLOOD PRESSURE: 124 MMHG | HEIGHT: 61 IN | DIASTOLIC BLOOD PRESSURE: 81 MMHG | OXYGEN SATURATION: 97 % | RESPIRATION RATE: 18 BRPM | WEIGHT: 151.68 LBS

## 2024-07-13 LAB
ABO GROUP (TYPE) IN BLOOD: NORMAL
ALBUMIN SERPL BCP-MCNC: 3.1 G/DL (ref 3.4–5)
ALP SERPL-CCNC: 95 U/L (ref 33–110)
ALT SERPL W P-5'-P-CCNC: 40 U/L (ref 7–45)
ANION GAP SERPL CALC-SCNC: 15 MMOL/L (ref 10–20)
ANTIBODY SCREEN: NORMAL
AST SERPL W P-5'-P-CCNC: 35 U/L (ref 9–39)
BILIRUB SERPL-MCNC: 0.3 MG/DL (ref 0–1.2)
BUN SERPL-MCNC: 12 MG/DL (ref 6–23)
CALCIUM SERPL-MCNC: 8.2 MG/DL (ref 8.6–10.6)
CHLORIDE SERPL-SCNC: 107 MMOL/L (ref 98–107)
CO2 SERPL-SCNC: 22 MMOL/L (ref 21–32)
CREAT SERPL-MCNC: 0.48 MG/DL (ref 0.5–1.05)
EGFRCR SERPLBLD CKD-EPI 2021: >90 ML/MIN/1.73M*2
ERYTHROCYTE [DISTWIDTH] IN BLOOD BY AUTOMATED COUNT: 15.5 % (ref 11.5–14.5)
GLUCOSE SERPL-MCNC: 72 MG/DL (ref 74–99)
HCT VFR BLD AUTO: 31.7 % (ref 36–46)
HGB BLD-MCNC: 10 G/DL (ref 12–16)
MCH RBC QN AUTO: 29.2 PG (ref 26–34)
MCHC RBC AUTO-ENTMCNC: 31.5 G/DL (ref 32–36)
MCV RBC AUTO: 92 FL (ref 80–100)
NRBC BLD-RTO: 0 /100 WBCS (ref 0–0)
PLATELET # BLD AUTO: 225 X10*3/UL (ref 150–450)
POTASSIUM SERPL-SCNC: 3.7 MMOL/L (ref 3.5–5.3)
PROT SERPL-MCNC: 6.1 G/DL (ref 6.4–8.2)
RBC # BLD AUTO: 3.43 X10*6/UL (ref 4–5.2)
RH FACTOR (ANTIGEN D): NORMAL
SODIUM SERPL-SCNC: 140 MMOL/L (ref 136–145)
WBC # BLD AUTO: 6.3 X10*3/UL (ref 4.4–11.3)

## 2024-07-13 PROCEDURE — 2500000005 HC RX 250 GENERAL PHARMACY W/O HCPCS: Performed by: STUDENT IN AN ORGANIZED HEALTH CARE EDUCATION/TRAINING PROGRAM

## 2024-07-13 PROCEDURE — 99238 HOSP IP/OBS DSCHRG MGMT 30/<: CPT | Performed by: STUDENT IN AN ORGANIZED HEALTH CARE EDUCATION/TRAINING PROGRAM

## 2024-07-13 PROCEDURE — 2500000001 HC RX 250 WO HCPCS SELF ADMINISTERED DRUGS (ALT 637 FOR MEDICARE OP): Performed by: STUDENT IN AN ORGANIZED HEALTH CARE EDUCATION/TRAINING PROGRAM

## 2024-07-13 PROCEDURE — 36415 COLL VENOUS BLD VENIPUNCTURE: CPT | Performed by: STUDENT IN AN ORGANIZED HEALTH CARE EDUCATION/TRAINING PROGRAM

## 2024-07-13 PROCEDURE — 80053 COMPREHEN METABOLIC PANEL: CPT | Performed by: STUDENT IN AN ORGANIZED HEALTH CARE EDUCATION/TRAINING PROGRAM

## 2024-07-13 PROCEDURE — 85027 COMPLETE CBC AUTOMATED: CPT | Performed by: STUDENT IN AN ORGANIZED HEALTH CARE EDUCATION/TRAINING PROGRAM

## 2024-07-13 RX ORDER — FUROSEMIDE 20 MG/1
20 TABLET ORAL DAILY
Qty: 4 TABLET | Refills: 0 | Status: SHIPPED | OUTPATIENT
Start: 2024-07-14 | End: 2024-07-18

## 2024-07-13 RX ORDER — NIFEDIPINE 30 MG/1
30 TABLET, FILM COATED, EXTENDED RELEASE ORAL EVERY 24 HOURS
Qty: 90 TABLET | Refills: 3 | Status: SHIPPED | OUTPATIENT
Start: 2024-07-13

## 2024-07-13 RX ORDER — FAMOTIDINE 20 MG/1
20 TABLET, FILM COATED ORAL 2 TIMES DAILY
Status: DISCONTINUED | OUTPATIENT
Start: 2024-07-13 | End: 2024-07-13 | Stop reason: HOSPADM

## 2024-07-13 RX ORDER — FUROSEMIDE 20 MG/1
20 TABLET ORAL DAILY
Status: DISCONTINUED | OUTPATIENT
Start: 2024-07-13 | End: 2024-07-13 | Stop reason: HOSPADM

## 2024-07-13 ASSESSMENT — PAIN SCALES - GENERAL
PAINLEVEL_OUTOF10: 6
PAINLEVEL_OUTOF10: 5 - MODERATE PAIN
PAINLEVEL_OUTOF10: 3
PAINLEVEL_OUTOF10: 5 - MODERATE PAIN

## 2024-07-13 ASSESSMENT — PAIN DESCRIPTION - DESCRIPTORS: DESCRIPTORS: SORE

## 2024-07-13 NOTE — H&P
Obstetrical Admission History and Physical     Rex Murphy is a 34 y.o.  who delivered 2024 by Vaginal, Spontaneous at 39w1d, readmitted for elevated pressures in setting of known likely eclampsia    Chief Complaint: Hypertension    Assessment/Plan    Eclampsia  - Witnessed seizure in [facility] 7/10. Only one mild range BP at this time, but suspected eclampsia. S/p MRI/A/V wnl and EEG wnl; neuro consulted with low c/f seizure based on description of event but will presume eclampsia  - Readmit  with severe BP requiring IR txt; hydral 5 ( @ 2230)  -  7/10, s/p normal echo . Still bradycardic and having chest pressure and LE edema but not worsening sx and satting well ORA -> repeat BNP pending . Lasix 20 IV given  - EKG sinus john   - Prior HELLP labs n/f mild transaminitis that resolved prior to dc. Pending on admission  - BLE edema without tenderness; low c/f DVT. Lasix as above  - S/p Mg 7/10; defer Mg at this time d/t no current neuro sx  - Nifed 30 started    Postpartum  - Continue routine pp care    D/w Dr. Marianne Morgan MD  PGY-4, Obstetrics and Gynecology    Principal Problem:    Eclampsia ([facility])      Pregnancy Problems (from 23 to present)       Problem Noted Resolved    Eclampsia ([facility]) 7/10/2024 by Yoon Young MD No    Priority:  Medium      39 weeks gestation of pregnancy ([facility]) 2024 by Tanvi Silva MD No    Priority:  Medium      Encounter for induction of labor ([facility]) 2024 by DELICIA Gutierrez, APRN-CNP No    Priority:  Medium      Anemia affecting pregnancy in third trimester ([facility]) 2024 by DELICIA Gooden No    Priority:  Medium      Overview Addendum 2024 12:57 PM by DELICIA Collazo, APRN-CNP      10.0 Hgb Ferritin 26, started on PO fe BID   hgb = 11.2         Gestational diabetes mellitus (GDM) in third trimester ([facility]) 2024 by Nieves HERRERA  DELICIA Ortiz No    Priority:  Medium      Overview Addendum 2024  3:06 PM by ALEKS Bunch     1hr 154  3hr abnormal     MFM appt on   Currently well-controlled with diet changes  2024 : nutrition  2024 Nutrition plan alone   2024 Nutrition plan alone   2024 Nutrition plan alone   2024 Nutrition plan   2024 No change - Nutrition plan     2024 Nutrition plan    2024  Nutrition plan               Family history of neural tube defect 2023 by DELICIA Gooden No    Priority:  Medium      Overview Addendum 2024 12:26 PM by DELICIA Gooden     Unknown specifically FOB had spine surgery at day 1 of life in [place]  Genetics consult placed and done see note         Decreased fetal movements in third trimester ([facility]) 2024 by DELICIA Gutierrez APRN-CNP 2024 by DELICIA Collazo, AUGIECNP    Uterine size-date discrepancy in third trimester ([facility]) 2024 by DELICIA Gooden 2024 by DELICIA Collazo, APRN-CNP    Overview Addendum 5/3/2024  4:09 PM by Nevaeh Hardy MD     AC 23rd%tile EFW 10th%tile   Resolved!      US EFW 31%tile Ac16th%tile               Subjective   Pt presents with elevated BP in 160s systolic at home. Also notes chest pressure and feeling like there is air she can't get out. Has significant bilateral LE edema - equal bilaterally with pain in feet but not in legs. No HA, vision changes, RUQ pain.       Obstetrical History   OB History    Para Term  AB Living   1 1 1     1   SAB IAB Ectopic Multiple Live Births         0 1      # Outcome Date GA Lbr Kayden/2nd Weight Sex Type Anes PTL Lv   1 Term 24 39w1d 31:32 / 04:13 3.21 kg M Vag-Spont EPI  ERROL       Past Medical History  Past Medical History:   Diagnosis Date    Syncope and collapse 2021    Near syncope        Past Surgical History   Past Surgical History:    Procedure Laterality Date    OTHER SURGICAL HISTORY  09/02/2021    No history of surgery       Social History  Social History     Tobacco Use    Smoking status: Never     Passive exposure: Never    Smokeless tobacco: Never   Substance Use Topics    Alcohol use: Never     Substance and Sexual Activity   Drug Use Never       Allergies  Amoxicillin and Cefazolin in dextrose (iso-os)     Medications  Medications Prior to Admission   Medication Sig Dispense Refill Last Dose    blood-glucose meter (Accu-Chek Guide Glucose Meter) misc Use for testing blood sugar during pregnancy 1 each 0     famotidine (Pepcid) 20 mg tablet Take 1 tablet (20 mg) by mouth once daily. 30 tablet 1     ferrous sulfate 325 (65 Fe) MG EC tablet Take 1 tablet by mouth 2 times a day. Take 1 tablet twice daily every other day- with orange juice or other high citrus beverage or food 60 tablet 11     lancets (Accu-Chek Softclix Lancets) misc Use 1 lancet 4-6 times per day during pregnancy 200 each 3     magnesium hydroxide (Milk of Magnesia) 400 mg/5 mL suspension Take 10 mL by mouth every 24 (twenty four) hours if needed for constipation. 360 mL 0     polyethylene glycol (Glycolax, Miralax) 17 gram packet Take 17 g by mouth 2 times a day as needed (first line). 30 each 3        Objective    Last Vitals  Temp Pulse Resp BP MAP O2 Sat     (!) 46   (!) 153/74   97 %     Physical Examination  Physical Exam  Constitutional:       Appearance: Normal appearance.   HENT:      Head: Normocephalic and atraumatic.      Nose: Nose normal.      Mouth/Throat:      Mouth: Mucous membranes are moist.   Eyes:      Extraocular Movements: Extraocular movements intact.   Cardiovascular:      Rate and Rhythm: Normal rate and regular rhythm.      Heart sounds: Normal heart sounds.   Pulmonary:      Effort: Pulmonary effort is normal. No respiratory distress.      Breath sounds: Normal breath sounds.   Abdominal:      General: There is no distension.   Musculoskeletal:          General: No tenderness. Normal range of motion.      Right lower leg: Edema present.      Left lower leg: Edema present.   Skin:     General: Skin is warm and dry.   Neurological:      General: No focal deficit present.      Mental Status: She is alert and oriented to person, place, and time.   Psychiatric:         Mood and Affect: Mood normal.         Behavior: Behavior normal.         Lab Review  Labs in chart were reviewed.

## 2024-07-13 NOTE — CARE PLAN
Problem: Postpartum  Goal: Experiences normal postpartum course  Outcome: Adequate for Discharge  Goal: Appropriate maternal -  bonding  Outcome: Adequate for Discharge  Goal: Establish and maintain infant feeding pattern for adequate nutrition  Outcome: Adequate for Discharge  Goal: Incisions, wounds, or drain sites healing without S/S of infection  Outcome: Adequate for Discharge  Goal: No s/sx infection  Outcome: Adequate for Discharge  Goal: No s/sx of hemorrhage  Outcome: Adequate for Discharge  Goal: Minimal s/sx of HDP and BP<160/110  Outcome: Adequate for Discharge     The patient has met criteria for discharge.

## 2024-07-13 NOTE — LACTATION NOTE
Lactation Consultant Note  Lactation Consultation  Reason for Consult: Follow-up assessment  Consultant Name: Teresa Conley RN, IBCLC    Maternal Information       Maternal Assessment  Breast Assessment: Medium, Full  Nipple Assessment: Intact, Short, Erect  Areola Assessment: Normal    Infant Assessment  Infant Behavior: Other (Comment) (baby and mom seperated d/t mm readmitted- baby at home)    Feeding Assessment  Nutrition Source: Breastmilk  Feeding Method: Feeding expressed breastmilk, Paced bottle  Unable to assess infant feeding at this time: Other (Comment) (baby and maternal seperation d/t mom readmitted)    LATCH TOOL       Breast Pump  Pump: Hospital grade electric pump, Double breast pumping, Massage  Frequency: 8-10 times per day  Duration: 15-20 minutes per session  Breast Shield Size and Type: 21 mm, Other (comment) (she measured to be 16 MM diameter- advised while in hospital to use 21 MM but, advised where to get smaller flanges for home pump)  Units of Volume: mL per session    Other OB Lactation Tools  Lactation Tools: Flanges    Patient Follow-up  Outpatient Lactation Follow-up: Recommended    Other OB Lactation Documentation  Maternal Risk Factors: Hypertension, Diabetes (gestational, types 1 or 2), Other (comment) (Eclamptic seizure, infant and maternal seperation d/t mom readmitted)  Infant Risk Factors: Early term birth 37-39 weeks, Other (comment) (exclkusiveluy pumping d/t seperation)    Recommendations/Summary  Patient readmitted for elevated blood pressures - Eclamptic seizure   Baby remains at home and mom has been exclusively pumping.     Mom is aware of pump function but, had questions regarding supply with pumping decreased  Noted mom to be using the 24 MM flanges- she measures to be 16 MM diameter- advised her to use 21 MM while here in the hospital but, reviewed where to purchase smaller flanges or flange inserts.     Mom aslo had questions about latching the baby. She has  been  from baby and is upset that the baby isn't latching to the breast.   Reviewed verbally with rationalle on positioning and latching technique. Reviewed her anatomy -  noted short nipples - discussed nipple shields if needed. Also encouraged mom to start doing skin to skin with the baby first  giving a little pumped breast milk prior to trying to latch (so baby isn't frantic ), pumping for a few minutes first to get her milk flowing, and then attempt to latch.   Discussed using shield if needed.   But, baby was not present to attempt any of these suggestions. Therefore, I highly recommended her calling Brie at [facility] for assistance with latching.   Gave mom contact information again.     Questions answered.     Patient may be discharged today depending on blood pressures.

## 2024-07-13 NOTE — DISCHARGE SUMMARY
Discharge Summary    Admission Date: 2024  Discharge Date: 2024    Discharge Diagnosis  Persistent severe range blood pressures in setting of previous Eclampsia     Hospital Course  33 yo G1 now  who is postpartum day #8 from [facility], readmitted with persistent severe range blood pressures.     Patient had previously had  on , complicated by 3b lac, had 1 mild range blood pressure. Was routinely discharged home on , then on 7/10 represented after a suspected eclamptic seizure at [facility]. Received 24 hrs pp Mg, was normotensive, did not require PO BP meds. Had HA that resolved, MRI/A/V and EEG and neuro consult, negative. Prior HELLP labs n/f mild transaminitis that resolved prior to dc.  7/10, s/p normal echo . Patient then represented this admission on  with severe range Bps requiring IV treatment with hydral 5, was started on nifed 30 and a course of lasix. On  was normotensive, asymptomatic, and stable for discharge home with return precautions     Pertinent Physical Exam At Time of Discharge  General: Examination reveals a well developed, well nourished, female, in no acute distress. She is alert and cooperative.  HEENT: PERRLA. External ears normal. Nose normal, no erythema or discharge. Mouth and throat clear.  Neck: supple, no significant adenopathy.  Lungs: clear to auscultation bilaterally.  Cardiac: regular rate and rhythm, S1, S2 normal, no murmur, click, rub or gallop.  Abdomen: soft, fundus firm.  Fundus: firm and below umbilicus.  Extremities: no redness or tenderness in the calves or thighs, no edema.  Neurological: alert, oriented, normal speech, no focal findings or movement disorder noted.  Psychological: awake and alert; oriented to person, place, and time.    Discharge Meds     Your medication list        START taking these medications        Instructions Last Dose Given Next Dose Due   furosemide 20 mg tablet  Commonly known as: Lasix  Start taking on:  July 14, 2024      Take 1 tablet (20 mg) by mouth once daily for 4 doses.       NIFEdipine ER 30 mg 24 hr tablet  Commonly known as: Adalat CC      Take 1 tablet (30 mg) by mouth once every 24 hours. Do not crush, chew, or split.              CONTINUE taking these medications        Instructions Last Dose Given Next Dose Due   blood-glucose meter misc  Commonly known as: Accu-Chek Guide Glucose Meter      Use for testing blood sugar during pregnancy       famotidine 20 mg tablet  Commonly known as: Pepcid      Take 1 tablet (20 mg) by mouth once daily.       ferrous sulfate 325 (65 Fe) MG EC tablet      Take 1 tablet by mouth 2 times a day. Take 1 tablet twice daily every other day- with orange juice or other high citrus beverage or food       lancets misc  Commonly known as: Accu-Chek Softclix Lancets      Use 1 lancet 4-6 times per day during pregnancy       magnesium hydroxide 400 mg/5 mL suspension  Commonly known as: Milk of Magnesia      Take 10 mL by mouth every 24 (twenty four) hours if needed for constipation.       polyethylene glycol 17 gram packet  Commonly known as: Glycolax, Miralax      Take 17 g by mouth 2 times a day as needed (first line).                 Where to Get Your Medications        These medications were sent to [facility]  - [place], [place] - [address]  [address], [place] [address]      Phone: [phone]   furosemide 20 mg tablet  NIFEdipine ER 30 mg 24 hr tablet          Complications Requiring Follow-Up  sPEC, 3B laceration from delivery     Test Results Pending At Discharge  Pending Labs       No current pending labs.            Outpatient Follow-Up  Future Appointments   Date Time Provider Department Center   7/18/2024  9:30 AM Tanvi Silva MD [id number]   7/18/2024 10:30 AM Tahmina Mahoney MD [facility] [id number]   8/15/2024 10:45 AM Lisset Shukla V, APRN-CNM, APRN-CNP [id number]       Pt dw Dr Nohelia Mott MD

## 2024-07-13 NOTE — DISCHARGE INSTRUCTIONS
Please call [phone] with any concerns     Post Birth Warning Signs  Any woman can have complications after the birth of a baby including a blood clot, a heart problem, hypertensive disorder/eclampsia, depression, hemorrhage, or infection. Notify all providers of your delivery date up to one year after birth.*       Call 911 or go to nearest emergency room right away if you have: PAIN or pressure in chest; OBSTRUCTED breathing or shortness of breath; SEIZURES; THOUGHTS of hurting yourself or your baby; heart palpitations/racing; change in alertness/confusion.    Call your provider if you have: BLEEDING, soaking through a pad/hour, or blood clots the size of an egg or bigger; INCISION (episiotomy stitches or  site) that is not healing (increased redness, pain, drainage/pus, or separation); RED or swollen leg/calf that is painful or warm to touch, especially in one leg more than the other; TEMPERATURE of 100.4 F or higher or chills; HEADACHE that does not get better with medicine, rest or hydration, or bad headache with vision changes like spots or flashing lights; increased swelling of face, hands or legs; severe cramps or upper right belly pain; red or swollen breast that is painful or warm to touch; an unusual, foul odor from your vaginal discharge; pain, burning, or difficulty during urination; severe constipation (more than 5 days); feelings of depression (such as depressed mood, loss of interest in enjoyable things, unable to care for yourself, trouble sleeping, lack of appetite, or feeling worthless).     If you can’t reach your provider or symptoms worsen, call 911 or go to nearest emergency room.   *Information obtained from [facility]’s: Save Your Life: Get Care for These POST-BIRTH Warning Signs              Pacifier Use  “The American Academy of Pediatrics recommends that pacifier use is best avoided during the initiation of breastfeeding and used only after breastfeeding is well established.  In  some infants, early pacifier use may interfere with establishment of good breastfeeding practices, whereas in others it may indicate the presence of a breastfeeding problem that requires intervention.  Use of a pacifier may cause problems with latching, and lead to decreased milk supply by missing feeding opportunities.  Pacifiers may be used during painful procedures, but are not otherwise recommended while the infant is learning to breastfeed.”

## 2024-07-14 LAB
ATRIAL RATE: 45 BPM
P AXIS: 32 DEGREES
P OFFSET: 186 MS
P ONSET: 141 MS
PR INTERVAL: 160 MS
Q ONSET: 221 MS
QRS COUNT: 7 BEATS
QRS DURATION: 70 MS
QT INTERVAL: 442 MS
QTC CALCULATION(BAZETT): 382 MS
QTC FREDERICIA: 401 MS
R AXIS: 16 DEGREES
T AXIS: 43 DEGREES
T OFFSET: 442 MS
VENTRICULAR RATE: 45 BPM

## 2024-07-15 ENCOUNTER — TELEPHONE (OUTPATIENT)
Dept: OBSTETRICS AND GYNECOLOGY | Facility: CLINIC | Age: 34
End: 2024-07-15
Payer: COMMERCIAL

## 2024-07-15 LAB
LABORATORY COMMENT REPORT: NORMAL
PATH REPORT.FINAL DX SPEC: NORMAL
PATH REPORT.GROSS SPEC: NORMAL
PATH REPORT.RELEVANT HX SPEC: NORMAL
PATH REPORT.TOTAL CANCER: NORMAL

## 2024-07-15 NOTE — TELEPHONE ENCOUNTER
Spoke to pt- she went to Labor and delivery and was seen and started on meds. Pt has appointments this Thursday. Pt advised to call us if she has any problems before Thursday. Pt states she is feeling better

## 2024-07-17 ENCOUNTER — LACTATION CONSULT (OUTPATIENT)
Dept: LACTATION | Facility: CLINIC | Age: 34
End: 2024-07-17
Payer: COMMERCIAL

## 2024-07-17 DIAGNOSIS — O92.70 LACTATION DISORDER (HHS-HCC): Primary | ICD-10-CM

## 2024-07-17 PROCEDURE — 99211 OFF/OP EST MAY X REQ PHY/QHP: CPT | Performed by: ADVANCED PRACTICE MIDWIFE

## 2024-07-17 ASSESSMENT — ENCOUNTER SYMPTOMS
LOSS OF SENSATION IN FEET: 0
OCCASIONAL FEELINGS OF UNSTEADINESS: 0
DEPRESSION: 0

## 2024-07-17 NOTE — PATIENT INSTRUCTIONS
PATIENT DISCUSSION SUMMARY:  .  .  Mother and infant seen for concerns over ongoing difficulties latching.  Mother has been feeding expressed milk and formula via bottle.    Hospital pediatrician noted short posterior frenulum.    Mother has been feeding on demand.   Mother's milk in and milk easily expressible.      Infant weight gain  good.  Infant alert with moist mucous membranes.  Wet and messy diaper in office.    Mother's nipples erect and breasts soft,.  Infant able to sustain latch briefly after multiple attempts.  Infant retracting and biting frequently, and losing latch.  I      Suck assessment abnormal: reveals infant with tight maxillary frenulum, and curls upper lip, and upper lip not able to be fully everted.  Infant with good cupping and extension of tongue just past alveolar ridge,  and moderate lateralization at this exam.  Infant not noted to elevate tongue past mid mouth on this exam. Infant with frequent retraction and biting about every 3 sucks. Palate high and intact to palpation. Infant turns head more easily to right.    Discussed to bottle feed with more upright positioning and oliva lips to help infant have wide latch when using bottle by pulling down on chin and everting upper lip.    Mother instructed on suck training exercises.    Pumping session observed with  [employer] S1    pump with  17    mm flange inserts.  Discussed increasing vacuum to comfort, use of massage and compression, and hand expression after pumping to improve breast emptying.  Mother shown technique for hand expression. Pumping out approximately 80  ml in 20   minutes.    Reviewed post birth warning signs and safe sleep.    Plan:  Mother to do suck training exercises prior to every feed. Attempt to latchat least 8-12 times daily, both breasts for as long as infant is actively sucking and swallowing. Limit to 10 minutes one side unless actively swallowing Use massage and compression to aid transfer.       At least 8  times daily, mother to pump both breasts at same time for 20 minutes, using massage and compression, with hand expression after to fully drain breast. Feed expressed milk to infant until satisfied.    Due to poor tongue elevation and frequent biting,  infant needs to be evaluated by ENT or pediatric dentist for tight posterior frenulum, and to evaluate maxillary frenulum.    Will f/u with lactation as needed and with pediatrician for 1 month visit.    Denies questions.  LACTATION PROBLEMS AND STRATEGIES:  Problems latching baby to breast: Baby should latch to areola (dark area) not just the nipple.  Baby's lips should be flanged outward.  Bring the baby up to the level of your breast by putting a pillow under the baby.  Dribble milk over the nipple or express milk so that baby can taste it  Encourage a deeper latch with the asymetrical latch- lining baby's nose opposite mother's nipple.  Gently tickle your baby's lip with your nipple to encourage your baby to open his/her mouth wide.  Hold baby so that your breast is positioned deep in the baby's mouth.  Hold your baby close, tummy to tummy.  If baby does not latch to breast, express breast milk.  If the baby is not latched on well, break seal and gently try again.  Keep baby skin to skin and watch for feeding cues.  Massage breast to start milk-ejection reflex.  Place nipple and at least 1 inch of areola into baby's mouth.  Place your hand behind the baby's neck and shoulder.  Do not force baby's head into breast.  Put baby in the football hold or clutch hold, supporting his/her neck and head in sniffing position to open his/her throat.  Shape breast into oval shape (sandwich hold)  for deep latch.  Try different feeding positions (cradle, football, side etc.).  Use a breast feeding pillow to bring baby up to the level of the breast.  Use semi-reclined feeding position to allow baby to take an active role and trigger baby's inborn feeding behavior.  Use your hand  "to support your breast during feeding.  When your baby's mouth is wide open, quickly pull baby into your breast.  Your baby's chin should be pressed into your breast.  Pumping pointers: Air dry nipples, express milk and rub in or use an approved nipple cream after expressing., Apply heat to breasts before pumping., Choose a familiar comfortable place to express milk., If nipples are sore use less pressure and pump more frequently for shorter times., Listen to a tape of baby while pumping., Look at a photo of baby wile expressing., Massage breasts before and during pumping., Minimize distractions., Moisten flange before beginning to improve seal., Relax for 5 minutes before pumping., Stimulate the nipples and hand express a few drops before pumping., and Use pumping bra/band for \"hands free\" pumping.  PATIENT INSTRUCTION HANDOUTS GIVEN:   Tips for pumping with an electric breast pump and milk storage for your healthy baby (PI# 123)  Suck training (PI# 176)  Foods that promote good milk production  Breastfeeding: Tips to increase your milk supply (PI# 162)  Breastfeeding: Sore and cracked nipples (PI# 167)  Breastfeeding: Plugged milk ducts/mastitis (PI# 164)  Breast massage with milk expression by hand (PI# [id number])  LACTATION EDUCATION:  Correct Positioning, Correct Latch On, and Demo use of Pump   "

## 2024-07-17 NOTE — PROGRESS NOTES
Lactation Counseling Note    Subjective:    Rex Murphy is a 34 y.o. patient referred for lactation counseling. She is here today due to Breast pumping concerns/issues and Latch issues. She was referred by her Pediatrician Osmany and inpatient lactation consultant .     OB HISTORY:   Healthcare Provider: [facility]  Prenatal Screening: Abnormal  GDM diet controlled  Maternal Blood Type: B positive  /Para: : 1  Para: 1  Weeks Gestation: 39   Mode of Delivery: Normal vaginal route  Induction/Augmentation  Epidural/General Anesthesia  3rd degree tear  Delivery Complications: CPAP and deep suction  Maternal Complications: eclampsia, readmitted for magnesium, then readmitted for blood pressure   [place]man Information: Baby's Name: Grace Burgess  : 24  MRN: [id number]  Place of Birth: [facility] [place]  Healthcare Provider: Osmany  APGARS: 1 minute: 6  5 minutes: 3  10 minutes 8  Skin to skin contact: Delayed maternal tear repair  Infant's blood type: not tested  Birth parameters: AGA  Birth weight: 3210 gm  Birth length: 53.5 cm  Discharge weight: 2960 gm  Complications: latch issues not able to latch without shield, maternal infant separation due to eclampsia and persistent high blood pressure  No other concerns    Objective:    BREASTFEEDING ASSESSMENT:   Breast Assessment: Medium, Symmetrical, Soft, and Compressible  Nipple Assessment/Stage: intact, short, erect, distorted shape or flattened, and sore Stage I - Pain or irritation with no skin breakdown  Areola: Normal  Feeding Position: breast sandwich, cross - cradle, laid back, football/seated, nipple to nose, mother needs assistance with latch/positioning, and baby's head too high over breast  Latch: moderate assistance is needed, instructed on deep latch, cries while latching, latch achieved after repeated attempts, mouth not open wide enough, latch is painful, sucking and swallowing, bursts of sucking, swallowing, and rest, upper lip turned in,  flanged lips, chin moves in rhythmic motion, and clenched jaws  Suck/Feeding: sustained, coordinated suck/swallow/breathe, clenching/biting, baby led rhythmically, audible swallowing, cheeks dimple during sucking, and supplemented breast  Alternative Feeding Methods: nursing at the breast, feeding expressed breast milk, and paced bottle  Feeding and Cleaning instructions reviewed for: Paced bottle  Infant Feeding Method: Breast milk and Formula  Infant Behavior: awake, fussy, readiness to feed, feeding cues observed, rooting response, and suckles on and off, needs stimulation  Infant Information: Ankyloglossia  Palate- high/arch/bubble/normal  Poor occlusion of lips around areola  Tongue protrudes over alveolar ridge  Tongue humped/bunched/retracted/elevated  Good cupping of tongue  Fontanel: flat  Mucous Membranes: moist  No other concerns  Breast Pain: Pain scale 0-10 (10 most pain): 0  Nipple Pain: Pain scale 0-10 (10 most pain): 5-7  Pain description: bite, pull  Before feeding pain 0-10 (10 most pain): 0  After adjustment pain 0-10 (10 most pain): 0  Other pain relief methods: none  No other concerns  Weight: Reported pediatrician visit weight: 3260 gm  Date of pediatrician weight: 24  Weight before feedin gm  Weight after feedin g but had taken about 28 ml from bottle gm  Amount of breast milk transferred: 0 ml  Number of voids in the last 24 hours: 8  Number of stools in the last 24 hours: 6  Inffant took 60 ml enfamil via bottle        PATIENT DISCUSSION SUMMARY:  .  .  Mother and infant seen for concerns over ongoing difficulties latching.  Mother has been feeding expressed milk and formula via bottle.    Hospital pediatrician noted short posterior frenulum.    Mother has been feeding on demand.   Mother's milk in and milk easily expressible.      Infant weight gain  good.  Infant alert with moist mucous membranes.  Wet and messy diaper in office.    Mother's nipples erect and breasts  soft,.  Infant able to sustain latch briefly after multiple attempts.  Infant retracting and biting frequently, and losing latch.  I      Suck assessment abnormal: reveals infant with tight maxillary frenulum, and curls upper lip, and upper lip not able to be fully everted.  Infant with good cupping and extension of tongue just past alveolar ridge,  and moderate lateralization at this exam.  Infant not noted to elevate tongue past mid mouth on this exam. Infant with frequent retraction and biting about every 3 sucks. Palate high and intact to palpation. Infant turns head more easily to right.    Discussed to bottle feed with more upright positioning and oliva lips to help infant have wide latch when using bottle by pulling down on chin and everting upper lip.    Mother instructed on suck training exercises.    Pumping session observed with  [employer] S1    pump with  17    mm flange inserts.  Discussed increasing vacuum to comfort, use of massage and compression, and hand expression after pumping to improve breast emptying.  Mother shown technique for hand expression. Pumping out approximately 80  ml in 20   minutes.    Reviewed post birth warning signs and safe sleep.    Plan:  Mother to do suck training exercises prior to every feed. Attempt to latchat least 8-12 times daily, both breasts for as long as infant is actively sucking and swallowing. Limit to 10 minutes one side unless actively swallowing Use massage and compression to aid transfer.       At least 8 times daily, mother to pump both breasts at same time for 20 minutes, using massage and compression, with hand expression after to fully drain breast. Feed expressed milk to infant until satisfied.    Due to poor tongue elevation and frequent biting,  infant needs to be evaluated by ENT or pediatric dentist for tight posterior frenulum, and to evaluate maxillary frenulum.    Will f/u with lactation as needed and with pediatrician for 1 month visit.    Denies  questions.  LACTATION PROBLEMS AND STRATEGIES:  Problems latching baby to breast: Baby should latch to areola (dark area) not just the nipple.  Baby's lips should be flanged outward.  Bring the baby up to the level of your breast by putting a pillow under the baby.  Dribble milk over the nipple or express milk so that baby can taste it  Encourage a deeper latch with the asymetrical latch- lining baby's nose opposite mother's nipple.  Gently tickle your baby's lip with your nipple to encourage your baby to open his/her mouth wide.  Hold baby so that your breast is positioned deep in the baby's mouth.  Hold your baby close, tummy to tummy.  If baby does not latch to breast, express breast milk.  If the baby is not latched on well, break seal and gently try again.  Keep baby skin to skin and watch for feeding cues.  Massage breast to start milk-ejection reflex.  Place nipple and at least 1 inch of areola into baby's mouth.  Place your hand behind the baby's neck and shoulder.  Do not force baby's head into breast.  Put baby in the football hold or clutch hold, supporting his/her neck and head in sniffing position to open his/her throat.  Shape breast into oval shape (sandwich hold)  for deep latch.  Try different feeding positions (cradle, football, side etc.).  Use a breast feeding pillow to bring baby up to the level of the breast.  Use semi-reclined feeding position to allow baby to take an active role and trigger baby's inborn feeding behavior.  Use your hand to support your breast during feeding.  When your baby's mouth is wide open, quickly pull baby into your breast.  Your baby's chin should be pressed into your breast.  Pumping pointers: Air dry nipples, express milk and rub in or use an approved nipple cream after expressing., Apply heat to breasts before pumping., Choose a familiar comfortable place to express milk., If nipples are sore use less pressure and pump more frequently for shorter times., Listen to  "a tape of baby while pumping., Look at a photo of baby wile expressing., Massage breasts before and during pumping., Minimize distractions., Moisten flange before beginning to improve seal., Relax for 5 minutes before pumping., Stimulate the nipples and hand express a few drops before pumping., and Use pumping bra/band for \"hands free\" pumping.  PATIENT INSTRUCTION HANDOUTS GIVEN:   Tips for pumping with an electric breast pump and milk storage for your healthy baby (PI# 123)  Suck training (PI# 176)  Foods that promote good milk production  Breastfeeding: Tips to increase your milk supply (PI# 162)  Breastfeeding: Sore and cracked nipples (PI# 167)  Breastfeeding: Plugged milk ducts/mastitis (PI# 164)  Breast massage with milk expression by hand (PI# [id number])  LACTATION EDUCATION:  Correct Positioning, Correct Latch On, and Demo use of Pump       "

## 2024-07-18 ENCOUNTER — OFFICE VISIT (OUTPATIENT)
Dept: OBSTETRICS AND GYNECOLOGY | Facility: CLINIC | Age: 34
End: 2024-07-18
Payer: COMMERCIAL

## 2024-07-18 ENCOUNTER — TELEPHONE (OUTPATIENT)
Dept: LACTATION | Facility: CLINIC | Age: 34
End: 2024-07-18

## 2024-07-18 VITALS
HEIGHT: 61 IN | HEART RATE: 94 BPM | BODY MASS INDEX: 26.49 KG/M2 | SYSTOLIC BLOOD PRESSURE: 112 MMHG | WEIGHT: 140.3 LBS | DIASTOLIC BLOOD PRESSURE: 78 MMHG

## 2024-07-18 DIAGNOSIS — O15.9: ICD-10-CM

## 2024-07-18 DIAGNOSIS — F41.9 ANXIETY: ICD-10-CM

## 2024-07-18 DIAGNOSIS — O24.410 DIET CONTROLLED GESTATIONAL DIABETES MELLITUS (GDM) IN THIRD TRIMESTER (HHS-HCC): ICD-10-CM

## 2024-07-18 DIAGNOSIS — Z86.32 HISTORY OF GESTATIONAL DIABETES MELLITUS (GDM): ICD-10-CM

## 2024-07-18 DIAGNOSIS — O15.9: Primary | ICD-10-CM

## 2024-07-18 PROBLEM — Z3A.39 39 WEEKS GESTATION OF PREGNANCY (HHS-HCC): Status: RESOLVED | Noted: 2024-05-31 | Resolved: 2024-07-18

## 2024-07-18 PROBLEM — Z34.90 ENCOUNTER FOR INDUCTION OF LABOR (HHS-HCC): Status: RESOLVED | Noted: 2024-05-16 | Resolved: 2024-07-18

## 2024-07-18 PROCEDURE — 99245 OFF/OP CONSLTJ NEW/EST HI 55: CPT | Performed by: OBSTETRICS & GYNECOLOGY

## 2024-07-18 PROCEDURE — 1036F TOBACCO NON-USER: CPT

## 2024-07-18 PROCEDURE — 99213 OFFICE O/P EST LOW 20 MIN: CPT

## 2024-07-18 PROCEDURE — 1036F TOBACCO NON-USER: CPT | Performed by: OBSTETRICS & GYNECOLOGY

## 2024-07-18 PROCEDURE — 3008F BODY MASS INDEX DOCD: CPT | Performed by: OBSTETRICS & GYNECOLOGY

## 2024-07-18 PROCEDURE — 99213 OFFICE O/P EST LOW 20 MIN: CPT | Mod: GC

## 2024-07-18 RX ORDER — AMOXICILLIN AND CLAVULANATE POTASSIUM 875; 125 MG/1; MG/1
875 TABLET, FILM COATED ORAL 2 TIMES DAILY
Qty: 14 TABLET | Refills: 0 | Status: SHIPPED | OUTPATIENT
Start: 2024-07-18 | End: 2024-07-25

## 2024-07-18 RX ORDER — PRAMOXINE HYDROCHLORIDE 10 MG/G
AEROSOL, FOAM TOPICAL EVERY 2 HOUR PRN
Qty: 15 G | Refills: 0 | Status: SHIPPED | OUTPATIENT
Start: 2024-07-18 | End: 2024-07-28

## 2024-07-18 ASSESSMENT — EDINBURGH POSTNATAL DEPRESSION SCALE (EPDS)
I HAVE BEEN ABLE TO LAUGH AND SEE THE FUNNY SIDE OF THINGS: AS MUCH AS I ALWAYS COULD
THINGS HAVE BEEN GETTING ON TOP OF ME: NO, I HAVE BEEN COPING AS WELL AS EVER
THE THOUGHT OF HARMING MYSELF HAS OCCURRED TO ME: NEVER
I HAVE LOOKED FORWARD WITH ENJOYMENT TO THINGS: AS MUCH AS I EVER DID
I HAVE FELT SCARED OR PANICKY FOR NO GOOD REASON: NO, NOT AT ALL
I HAVE BEEN ANXIOUS OR WORRIED FOR NO GOOD REASON: NO, NOT AT ALL
I HAVE BLAMED MYSELF UNNECESSARILY WHEN THINGS WENT WRONG: NO, NEVER
I HAVE FELT SAD OR MISERABLE: NO, NOT AT ALL
TOTAL SCORE: 0
I HAVE BEEN SO UNHAPPY THAT I HAVE HAD DIFFICULTY SLEEPING: NOT AT ALL
I HAVE BEEN SO UNHAPPY THAT I HAVE BEEN CRYING: NO, NEVER

## 2024-07-18 ASSESSMENT — PAIN SCALES - GENERAL: PAINLEVEL: 6

## 2024-07-18 ASSESSMENT — PATIENT HEALTH QUESTIONNAIRE - PHQ9
SUM OF ALL RESPONSES TO PHQ9 QUESTIONS 1 AND 2: 0
1. LITTLE INTEREST OR PLEASURE IN DOING THINGS: NOT AT ALL
2. FEELING DOWN, DEPRESSED OR HOPELESS: NOT AT ALL

## 2024-07-18 NOTE — PROGRESS NOTES
I saw and evaluated the patient. I personally obtained the key and critical portions of the history and physical exam or was physically present for key and critical portions performed by the resident/fellow. I reviewed the resident/fellow's documentation and discussed the patient with the resident/fellow. I agree with the resident/fellow's medical decision making as documented in the note.    Callie De Jesus MD

## 2024-07-18 NOTE — PROGRESS NOTES
Early Postpartum follow up  2024         SUBJECTIVE    HPI: Rex Murphy is a 34 y.o.  now 2 weeks postpartum from  with 3b perineal laceration. Post op course complicated by presumed eclampsia after seizure-like activity, elevated BP, and significant edema. BNP was 563. Echo was normal. Patient was discharged on  with nifed 30 and a 5 day lasix course.     Patient reports she lost 11 lbs during lasix course and her edema has significantly improved. She is still taking nifedipine 30mg daily and takes her BP at home BID with pressures ranging from 110s-130s/70s-90s. Denies seizure like activity, HA, vision changes, CP, SOB, RUQ pain, or worsening edema.    Her anxiety has improved since discharge.     OBJECTIVE  Visit Vitals  LMP 10/05/2023 (Exact Date)   OB Status Recent pregnancy   Smoking Status Never     Physical Exam   General: Alert and oriented, in NAD  : deferred   Skin: No rashes/lesions/erythema    Neuro: Awake, alert, conversational  CV: Regular rate  Respiratory: Even and unlabored on RA  Abdominal: soft, nontender, nondistended  Extremities: Full ROM  Psych: appropriate mood and affect      ASSESSMENT & PLAN    Rex Murphy is a 34 y.o.  at 39w1d here for the following concerns we addressed today:    History of gestational diabetes mellitus (GDM)  Ordered 2h GTT to be completed by 6 weeks postpartum    Eclampsia ([facility])  - Normotensive and asymptomatic today. Normotensive to mild range at home.  - Continue nifedipine 30mg daily  - PCP referral placed       Orders Placed This Encounter   Procedures    Glucose Tolerance Test, 2 hour (Non-Pregnancy)     Standing Status:   Future     Standing Expiration Date:   2025     Order Specific Question:   Release result to [facility]     Answer:   Immediate [1]    Referral to Primary Care     Standing Status:   Future     Standing Expiration Date:   2025     Referral Priority:   Routine     Referral Type:   Consultation     Referral  Reason:   Specialty Services Required     Requested Specialty:   Family Medicine     Number of Visits Requested:   1        RTC in 4 weeks for routine postpartum visit    Seen & Discussed with Dr. Neal Silva MD, PGY-2

## 2024-07-18 NOTE — ASSESSMENT & PLAN NOTE
- Normotensive and asymptomatic today. Normotensive to mild range at home.  - Continue nifedipine 30mg daily  - PCP referral placed

## 2024-07-18 NOTE — PROGRESS NOTES
[facility] Department of Urogynecology   Tahmina Mahoney MD, MPH   [phone]    ASSESSMENT AND PLAN:   34 year old female presenting in the [facility] clinic following 3b perineal laceration s/p vaginal delivery on 7/5/24.     Diagnoses:  #1 3b perineal laceration    #2 hx of eclampsia  #3 pp anxiety  #4 breast feeding    Plan:  1. 3b perineal laceration, postpartum visit   - Upon exam, she has superficial separation at posterior introitus and some swelling  - We discussed that she may use comfort measures like a Sitz bath with soak & seal, ice packs, donut pillows, and Proctofoam. Counseled her that witch hazel and dermaplast can be irritating.   - She can take Ibuprofen and Tylenol for pain management.   - Rx: Augmentin 1 tablet (875 mg) by mouth 2 times a day for 7 days for superficial separation noted on exam. She can take Pepcid for GI upset with abx.   - It can take 4-6 weeks for bleeding to resolve.   - PFPT requisition sent today and gave her the list of local physical therapists with their corresponding locations/contact information.  - She already has a referral for OBGYN behavioral health for hx of eclampsia. Will send an email today to behavioral health so patient can get in contact with them.      2. Eclampsia  - follow up with obgyn = has appt today    3. Anxiety related to delivery and eclampsia  - has referral to behavioral health- encouraged her to reach out.   - sent an email to Isamar Gill    4. Breast feeding  - offered support, baby may have tongue tie    Follow-up in 2 weeks or sooner PRN with Dr. Mahoney.     Scribe Attestation:   Odilia WILSON am scribing for virtually, and in the presence of Tahmina Mahoney MD MPH on 7/18/24 at 1:16 PM.     Problem List Items Addressed This Visit    None  Visit Diagnoses       Type 3b perineal laceration ([facility])    -  Primary    Relevant Medications    amoxicillin-pot clavulanate (Augmentin) 875-125 mg tablet    pramoxine (Proctofoam) 1 % foam     Other Relevant Orders    Referral to Physical Therapy           I spent a total of 60 minutes in face to face and non face to face time.     I Dr. Mahoney, personally performed the services described in the documentation as scribed in my presence and confirm it is both complete and accurate.  Tahmina Mahoney MD, MPH, FACOG    I saw and evaluated the patient. I personally obtained the key and critical portions of the history and physical exam or was physically present for key and critical portions performed by the resident/fellow. I reviewed the resident/fellow's documentation and discussed the patient with the resident/fellow. I agree with the resident/fellow's medical decision making as documented in the note.    Tahmina Mahoney MD MPH       Tahmina Mahoney MD, MPH, FACOG     New    HISTORY OF PRESENT ILLNESS:   Rex Murphy is a 34 y.o.  who presents for 3b laceration in the [facility] clinic.     Record Review:  - 24 Discharge Summary for Eclampsia: Now postpartum day #8 from [facility], readmitted with persistent severe range blood pressures. Patient had previously had  on , complicated by 3b lac, had 1 mild range blood pressure. Was routinely discharged home on , then on 7/10 represented after a suspected eclamptic seizure at [facility]. Received 24 hrs pp Mg, was normotensive, did not require PO BP meds. Had HA that resolved, MRI/A/V and EEG and neuro consult, negative. Prior HELLP labs n/f mild transaminitis that resolved prior to dc.  7/10, s/p normal echo . Patient then represented this admission on  with severe range Bps requiring IV treatment with hydral 5, was started on nifed 30 and a course of lasix. On  was normotensive, asymptomatic, and stable for discharge home with return precautions.     Date delivered: 24   Gestational age: 39w1d   Delivery type:  complicated by 3b perineal laceration.   Infant weight: 7lb1oz   Laceration type: 3b perineal laceration   She had  eclampsia postpartum day 2-3. Patient was understandably anxious after this event. Mood has improved and she has a good support system.   She has small amounts of vaginal bleeding.   Denies fevers or chills.      Pain control: Uses Advil and tylenol, ice, dermaplast, and witch hazel pads. Endorses pain over perineum after being more active. She describes a tugging and burning pain.   Breast feeding: yes  Bowel regimen: not using, had one episode of constipation 2-3 days ago     UI: initially yes, but not currently  ABL: no  Flatal incontinence: initially yes, but not currently  [place]? no      Past Medical History:       Past Medical History:   Diagnosis Date    Syncope and collapse 09/02/2021    Near syncope          Past Surgical History:       Past Surgical History:   Procedure Laterality Date    OTHER SURGICAL HISTORY  09/02/2021    No history of surgery         Medications:       Prior to Admission medications    Medication Sig Start Date End Date Taking? Authorizing Provider   blood-glucose meter (Accu-Chek Guide Glucose Meter) misc Use for testing blood sugar during pregnancy 4/25/24  Yes DELICIA Gooden   famotidine (Pepcid) 20 mg tablet Take 1 tablet (20 mg) by mouth once daily. 12/7/23 12/6/24 Yes DELICIA Gooden   NIFEdipine ER (Adalat CC) 30 mg 24 hr tablet Take 1 tablet (30 mg) by mouth once every 24 hours. Do not crush, chew, or split. 7/13/24  Yes Yamilet Mott MD   polyethylene glycol (Glycolax, Miralax) 17 gram packet Take 17 g by mouth 2 times a day as needed (first line). 7/11/24  Yes Yamilet Farnsworth MD   amoxicillin-pot clavulanate (Augmentin) 875-125 mg tablet Take 1 tablet (875 mg) by mouth 2 times a day for 7 days. 7/18/24 7/25/24  Yulissa Morgan MD   ferrous sulfate 325 (65 Fe) MG EC tablet Take 1 tablet by mouth 2 times a day. Take 1 tablet twice daily every other day- with orange juice or other high citrus beverage or food  Patient not taking:  "Reported on 7/17/2024 4/19/24 4/19/25  AARON Gooden-RAYMUNDO   furosemide (Lasix) 20 mg tablet Take 1 tablet (20 mg) by mouth once daily for 4 doses.  Patient not taking: Reported on 7/18/2024 7/14/24 7/18/24  Yamilet Mott MD   lancets (Accu-Chek Softclix Lancets) misc Use 1 lancet 4-6 times per day during pregnancy 5/20/24   AARON Gutierrez-CNM, APRN-CNP   magnesium hydroxide (Milk of Magnesia) 400 mg/5 mL suspension Take 10 mL by mouth every 24 (twenty four) hours if needed for constipation.  Patient not taking: Reported on 7/17/2024 7/11/24   Yamilet Farnsworth MD   pramoxine (Proctofoam) 1 % foam Insert into the rectum every 2 hours if needed for irritation for up to 10 days. Do not insert into the rectum. Use a small amount of foam on the perineum daily 7/18/24 7/28/24  Yulissa Morgan MD        ROS  Review of Systems       PHYSICAL EXAM:      /78   Pulse 94   Ht 1.549 m (5' 1\")   Wt 63.6 kg (140 lb 4.8 oz)   LMP 10/05/2023 (Exact Date)   Breastfeeding Yes   BMI 26.51 kg/m²      Patient's last menstrual period was 10/05/2023 (exact date).      Declines chaperone for physical exam.      Well developed, well nourished, in no apparent distress.   Neurologic/Psychiatric:  Awake, Alert and Oriented times 3.  Affect normal.     GENITAL/URINARY:       External Genitalia:  The patient has normal appearing external genitalia, normal skenes and bartholins glands, and a normal hair distribution.  Her vulva is without lesions, erythema or discharge.  Superficial separation at posterior introitus. Exudate noted, but it does not appear actively infected.      Urethral Meatus:  Size normal, Location normal, Lesions absent, Prolapse absent,      Urethra:  Fullness absent, Masses absent,      Bladder:  Fullness absent, Masses absent, Tenderness absent,      Vagina:  General appearance normal, Estrogen effect normal, Discharge absent, Lesions absent, Posterior wall is intact.      Anus/Perineum:  " Lesions absent and Masses absent, visually concentric anal squeeze   Normal Perineum        Physical Exam  Genitourinary:      Genitourinary Comments: Normal sensation over bilateral labia and buttocks.        Anal wink absent and BC reflex absent.             Data and DIAGNOSTIC STUDIES REVIEWED   Electrocardiogram, 12-lead PRN ACS symptoms    Result Date: 7/14/2024  Sinus bradycardia Cannot rule out Anterior infarct (cited on or before 12-JUL-2024) Abnormal ECG When compared with ECG of 12-JUL-2024 19:51, Vent. rate has decreased BY  47 BPM QRS axis Shifted right Questionable change in initial forces of Anteroseptal leads QT has shortened Confirmed by Eduardo Kowalski ([id number]) on 7/14/2024 9:34:20 PM    ECG 12 lead    Result Date: 7/11/2024  Sinus bradycardia with sinus arrhythmia Otherwise normal ECG When compared with ECG of 22-APR-2024 08:41, Vent. rate has decreased BY  43 BPM QT has shortened Confirmed by Denzel Baum ([id number]) on 7/11/2024 9:37:33 PM    MR brain wo IV contrast    Result Date: 7/11/2024  Interpreted By:  Dorian Martinez,  and Jw Muñoz STUDY: MR BRAIN WO IV CONTRAST; MR VENOGRAPHY INTRACRANIAL WO IV CONTRAST; MR ANGIO HEAD WO IV CONTRAST;  7/10/2024 10:04 pm   INDICATION: Signs/Symptoms:c/f eclamptic seizure, headache.   COMPARISON: 07/25/2020   ACCESSION NUMBER(S): [id number]; [id number]; [id number]   ORDERING CLINICIAN: RAO HALE   TECHNIQUE: The brain was studied in the sagittal, axial and coronal planes utilizing FLAIR, T1 and T2 weighted images.   Time-of-flight MRA and MRV of the head was performed. The images were reviewed as source images and maximum intensity projections.   FINDINGS: MRI BRAIN: Parenchyma: There is no diffusion restriction abnormality to suggest acute infarct. There is no focal parenchymal signal abnormality. There is no mass effect or midline shift. Gradient echo T2 weighted images fail to demonstrate hemosiderin deposition or other evidence of hemorrhage.  . Left frontal punctate cortical calcification again seen   CSF Spaces: The ventricles, sulci and basal cisterns are within normal limits.   Paranasal Sinuses and Mastoids: Visualized paranasal sinuses and mastoid air cells are unremarkable.   Orbits: Unremarkable appearance of the bilateral orbits.     MRA BRAIN: Anterior circulation: Mild irregularity along the proximal right anterior communicating artery with felt to be tortuous vessel. There is otherwise expected flow signal in bilateral intracranial internal carotid arteries, bilateral carotid terminals, bilateral proximal anterior and middle cerebral arteries. Posterior circulation: Bilateral intracranial vertebral arteries, vertebrobasilar junction, basilar artery and proximal posterior cerebral arteries demonstrate expected flow signal.   MRV BRAIN: Superior sagittal sinus: Normal. Torcula: Normal. Straight sinus:Normal. Internal cerebral veins/vein of Adriano: Normal. Transverse sinuses: Normal. Sigmoid sinuses: Normal. Proximal jugular veins: Normal.       1. There is no evidence of mass, infarction or hemorrhage. 2. Unremarkable MR a without evidence of stenosis or aneurysm 3. Unremarkable MRV of the brain.   I personally reviewed the images/study and I agree with the findings as stated. This study was interpreted at [place], [place].   MACRO: None.   Signed by: Dorian Martinez 7/11/2024 3:25 AM Dictation workstation:   [id number]    MR angio head wo IV contrast    Result Date: 7/11/2024  Interpreted By:  Dorian Martinez,  and Jw Muñoz STUDY: MR BRAIN WO IV CONTRAST; MR VENOGRAPHY INTRACRANIAL WO IV CONTRAST; MR ANGIO HEAD WO IV CONTRAST;  7/10/2024 10:04 pm   INDICATION: Signs/Symptoms:c/f eclamptic seizure, headache.   COMPARISON: 07/25/2020   ACCESSION NUMBER(S): [id number]; [id number]; [id number]   ORDERING CLINICIAN: RAO HALE   TECHNIQUE: The brain was studied in the sagittal, axial and  coronal planes utilizing FLAIR, T1 and T2 weighted images.   Time-of-flight MRA and MRV of the head was performed. The images were reviewed as source images and maximum intensity projections.   FINDINGS: MRI BRAIN: Parenchyma: There is no diffusion restriction abnormality to suggest acute infarct. There is no focal parenchymal signal abnormality. There is no mass effect or midline shift. Gradient echo T2 weighted images fail to demonstrate hemosiderin deposition or other evidence of hemorrhage. . Left frontal punctate cortical calcification again seen   CSF Spaces: The ventricles, sulci and basal cisterns are within normal limits.   Paranasal Sinuses and Mastoids: Visualized paranasal sinuses and mastoid air cells are unremarkable.   Orbits: Unremarkable appearance of the bilateral orbits.     MRA BRAIN: Anterior circulation: Mild irregularity along the proximal right anterior communicating artery with felt to be tortuous vessel. There is otherwise expected flow signal in bilateral intracranial internal carotid arteries, bilateral carotid terminals, bilateral proximal anterior and middle cerebral arteries. Posterior circulation: Bilateral intracranial vertebral arteries, vertebrobasilar junction, basilar artery and proximal posterior cerebral arteries demonstrate expected flow signal.   MRV BRAIN: Superior sagittal sinus: Normal. Torcula: Normal. Straight sinus:Normal. Internal cerebral veins/vein of Adriano: Normal. Transverse sinuses: Normal. Sigmoid sinuses: Normal. Proximal jugular veins: Normal.       1. There is no evidence of mass, infarction or hemorrhage. 2. Unremarkable MR a without evidence of stenosis or aneurysm 3. Unremarkable MRV of the brain.   I personally reviewed the images/study and I agree with the findings as stated. This study was interpreted at [place], [place].   MACRO: None.   Signed by: Dorian Martinez 7/11/2024 3:25 AM Dictation workstation:    [id number]    MR venography intracranial wo IV contrast    Result Date: 7/11/2024  Interpreted By:  Dorian Martinez,  and Jw Muñoz STUDY: MR BRAIN WO IV CONTRAST; MR VENOGRAPHY INTRACRANIAL WO IV CONTRAST; MR ANGIO HEAD WO IV CONTRAST;  7/10/2024 10:04 pm   INDICATION: Signs/Symptoms:c/f eclamptic seizure, headache.   COMPARISON: 07/25/2020   ACCESSION NUMBER(S): [id number]; [id number]; [id number]   ORDERING CLINICIAN: RAO HALE   TECHNIQUE: The brain was studied in the sagittal, axial and coronal planes utilizing FLAIR, T1 and T2 weighted images.   Time-of-flight MRA and MRV of the head was performed. The images were reviewed as source images and maximum intensity projections.   FINDINGS: MRI BRAIN: Parenchyma: There is no diffusion restriction abnormality to suggest acute infarct. There is no focal parenchymal signal abnormality. There is no mass effect or midline shift. Gradient echo T2 weighted images fail to demonstrate hemosiderin deposition or other evidence of hemorrhage. . Left frontal punctate cortical calcification again seen   CSF Spaces: The ventricles, sulci and basal cisterns are within normal limits.   Paranasal Sinuses and Mastoids: Visualized paranasal sinuses and mastoid air cells are unremarkable.   Orbits: Unremarkable appearance of the bilateral orbits.     MRA BRAIN: Anterior circulation: Mild irregularity along the proximal right anterior communicating artery with felt to be tortuous vessel. There is otherwise expected flow signal in bilateral intracranial internal carotid arteries, bilateral carotid terminals, bilateral proximal anterior and middle cerebral arteries. Posterior circulation: Bilateral intracranial vertebral arteries, vertebrobasilar junction, basilar artery and proximal posterior cerebral arteries demonstrate expected flow signal.   MRV BRAIN: Superior sagittal sinus: Normal. Torcula: Normal. Straight sinus:Normal. Internal cerebral veins/vein of Adriano:  Normal. Transverse sinuses: Normal. Sigmoid sinuses: Normal. Proximal jugular veins: Normal.       1. There is no evidence of mass, infarction or hemorrhage. 2. Unremarkable MR a without evidence of stenosis or aneurysm 3. Unremarkable MRV of the brain.   I personally reviewed the images/study and I agree with the findings as stated. This study was interpreted at [place], [place].   MACRO: None.   Signed by: Dorian Martinez 7/11/2024 3:25 AM Dictation workstation:   [id number]    Transthoracic Echo (TTE) Complete    Result Date: 7/10/2024   [facility], [address] [place] [address]                Tel [phone] and Fax [phone] TRANSTHORACIC ECHOCARDIOGRAM REPORT  Patient Name:      CRISTAL Garcia Physician:    [id number] Leslye Jacobson MD Study Date:        7/10/2024            Ordering Provider:    [id number] HALE MRN/PID:           [id number]             Fellow: Accession#:        [id number]         Nurse:                Sugey Alfaro RN Date of Birth/Age: 1990 / 34 years  Sonographer:          DILLON Jones RDCS Gender:            F                    Additional Staff: Height:            154.94 cm            Admit Date:           7/10/2024 Weight:            68.04 kg             Admission Status:     Inpatient -                                                               Routine BSA / BMI:         1.67 m2 / 28.34      Encounter#:           [id number]                    kg/m2 Blood Pressure:    95/61 mmHg           Department Location:  [place] [address] Study Type:    TRANSTHORACIC ECHO (TTE)  COMPLETE Diagnosis/ICD: Other specified postprocedural states-Z98.890 Indication:    Post partum eclampsia CPT Code:      Echo Complete w Full Doppler-[id number] Patient History: Pertinent History: Gestational DM, anemia, GERD, [place], post partum day 5 w/                    readmmit for eclampsia. Study Detail: The following Echo studies were performed: 2D, M-Mode, Doppler and               color flow. Technically challenging study due to prominent lung               artifact. Definity used as a contrast agent for endocardial border               definition. Total contrast used for this procedure was 2 mL via IV               push. The patient was awake.  PHYSICIAN INTERPRETATION: Left Ventricle: Left ventricular ejection fraction is normal, calculated by Anderson's biplane at 59%. There are no regional wall motion abnormalities. The left ventricular cavity size is normal. Spectral Doppler shows a normal pattern of left ventricular diastolic filling. Left Atrium: The left atrium is normal in size. Right Ventricle: The right ventricle is normal in size. There is normal right ventricular global systolic function. Right Atrium: The right atrium is normal in size. Aortic Valve: The aortic valve is trileaflet. There is no evidence of aortic valve regurgitation. The peak instantaneous gradient of the aortic valve is 10.2 mmHg. Mitral Valve: The mitral valve is normal in structure. There is mild to moderate mitral valve regurgitation which is posteriorly directed. Tricuspid Valve: The tricuspid valve is structurally normal. There is mild tricuspid regurgitation. The Doppler estimated RVSP is within normal limits at 29.8 mmHg. Pulmonic Valve: The pulmonic valve is structurally normal. There is mild to moderate pulmonic valve regurgitation. Pericardium: There is a trivial pericardial effusion. Aorta: The aortic root is normal. Systemic Veins: The inferior vena cava appears to be of normal size. There is IVC inspiratory collapse  greater than 50%. In comparison to the previous echocardiogram(s): There are no prior studies on this patient for comparison purposes. No prior echocardiogram available for comparison.  CONCLUSIONS:  1. Left ventricular ejection fraction is normal, calculated by Anderson's biplane at 59%.  2. There is normal right ventricular global systolic function.  3. Mild to moderate mitral valve regurgitation.  4. RVSP within normal limits.  5. No prior echocardiogram available for comparison. QUANTITATIVE DATA SUMMARY: 2D MEASUREMENTS:                          Normal Ranges: Ao Root d:     2.30 cm   (2.0-3.7cm) LAs:           4.10 cm   (2.7-4.0cm) IVSd:          0.70 cm   (0.6-1.1cm) LVPWd:         0.70 cm   (0.6-1.1cm) LVIDd:         5.00 cm   (3.9-5.9cm) LVIDs:         3.30 cm LV Mass Index: 68.6 g/m2 LV % FS        34.0 % LA VOLUME:                               Normal Ranges: LA Vol A4C:        56.7 ml    (22+/-6mL/m2) LA Vol A2C:        49.5 ml LA Vol BP:         53.6 ml LA Vol Index A4C:  33.9ml/m2 LA Vol Index A2C:  29.6 ml/m2 LA Vol Index BP:   32.0 ml/m2 LA Area A4C:       17.9 cm2 LA Area A2C:       16.9 cm2 LA Major Axis A4C: 4.8 cm LA Major Axis A2C: 4.9 cm RA VOLUME BY A/L METHOD:                               Normal Ranges: RA Vol A4C:        34.4 ml    (8.3-19.5ml) RA Vol Index A4C:  20.6 ml/m2 RA Area A4C:       13.5 cm2 RA Major Axis A4C: 4.5 cm AORTA MEASUREMENTS:                      Normal Ranges: Ao Sinus, d: 2.50 cm (2.1-3.5cm) Asc Ao, d:   2.60 cm (2.1-3.4cm) LV SYSTOLIC FUNCTION BY 2D PLANIMETRY (MOD):                      Normal Ranges: EF-A4C View:    60 % (>=55%) EF-A2C View:    58 % EF-Biplane:     59 % EF-3DQ:         56 % LV EF Reported: 59 % LV DIASTOLIC FUNCTION:                               Normal Ranges: MV Peak E:        1.24 m/s    (0.7-1.2 m/s) MV Peak A:        0.58 m/s    (0.42-0.7 m/s) E/A Ratio:        2.14        (1.0-2.2) MV e'             0.108 m/s   (>8.0) MV lateral e'     0.13  m/s MV medial e'      0.08 m/s MV A Dur:         111.00 msec E/e' Ratio:       11.49       (<8.0) PulmV Sys Rupert:    48.00 cm/s PulmV Beckwith Rupert:   75.00 cm/s PulmV S/D Rupert:    0.60 PulmV A Revs Rupert: 26.60 cm/s MITRAL VALVE:                 Normal Ranges: MV DT: 114 msec (150-240msec) AORTIC VALVE:                          Normal Ranges: AoV Vmax:      1.60 m/s  (<=1.7m/s) AoV Peak PG:   10.2 mmHg (<20mmHg) LVOT Max Rupert:  1.17 m/s  (<=1.1m/s) LVOT VTI:      25.80 cm LVOT Diameter: 1.70 cm   (1.8-2.4cm) AoV Area,Vmax: 1.53 cm2  (2.5-4.5cm2)  RIGHT VENTRICLE: RV Basal 3.40 cm RV Mid   2.50 cm RV Major 7.0 cm TAPSE:   23.6 mm RV s'    0.13 m/s TRICUSPID VALVE/RVSP:                             Normal Ranges: Peak TR Velocity: 2.59 m/s RV Syst Pressure: 29.8 mmHg (< 30mmHg) IVC Diam:         1.60 cm PULMONIC VALVE:                         Normal Ranges: PV Accel Time: 135 msec (>120ms) PV Max Rupert:    1.5 m/s  (0.6-0.9m/s) PV Max P.3 mmHg Pulmonary Veins: PulmV A Revs Rupert: 26.60 cm/s PulmV Beckwith Rupert:   75.00 cm/s PulmV S/D Rupert:    0.60 PulmV Sys Rupert:    48.00 cm/s  [id number] Leslye Jacobson MD Electronically signed on 7/10/2024 at 5:48:00 PM  ** Final **     XR chest 2 views    Result Date: 2024  Interpreted By:  Jung Corrigan, STUDY: XR CHEST 2 VIEWS;  2024 10:14 pm   INDICATION: Signs/Symptoms:sob.   COMPARISON: None.   ACCESSION NUMBER(S): [id number]   ORDERING CLINICIAN: RISHI CRUZ   FINDINGS:         CARDIOMEDIASTINAL SILHOUETTE: Cardiomediastinal silhouette is normal in size and configuration.   LUNGS: There is blunting of the right costophrenic angle consistent with a small effusion. There is no consolidation. There is no edema.   ABDOMEN: No remarkable upper abdominal findings.   BONES: No acute osseous changes.       1.  Small right pleural effusion       MACRO: None   Signed by: Jung Corrigan 2024 10:21 PM Dictation workstation:   [id number]      Lab Results   Component Value Date     URINECULTURE No significant growth 07/09/2024      Lab Results   Component Value Date    GLUCOSE 72 (L) 07/13/2024    CALCIUM 8.2 (L) 07/13/2024     07/13/2024    K 3.7 07/13/2024    CO2 22 07/13/2024     07/13/2024    BUN 12 07/13/2024    CREATININE 0.48 (L) 07/13/2024     Lab Results   Component Value Date    WBC 6.3 07/13/2024    HGB 10.0 (L) 07/13/2024    HCT 31.7 (L) 07/13/2024    MCV 92 07/13/2024     07/13/2024

## 2024-07-18 NOTE — ASSESSMENT & PLAN NOTE
- Anxiety surrounding birth experience  - Has referral to OB Behavioral Health. Will reach out to coordinate scheduling

## 2024-07-18 NOTE — LACTATION NOTE
Follow up phone call for lactation visit. Has been doing suck training but has not been able to get baby to latch . Plans evaluations for ties by pediatric dentist.  Will follow up with lactation as needed. Denies questions.

## 2024-07-23 ENCOUNTER — PATIENT OUTREACH (OUTPATIENT)
Dept: CARE COORDINATION | Facility: CLINIC | Age: 34
End: 2024-07-23
Payer: COMMERCIAL

## 2024-07-23 RX ORDER — PRAMOXINE HYDROCHLORIDE HYDROCORTISONE ACETATE 100; 100 MG/10G; MG/10G
1 AEROSOL, FOAM TOPICAL 2 TIMES DAILY
Qty: 10 G | Refills: 1 | Status: SHIPPED | OUTPATIENT
Start: 2024-07-23

## 2024-07-24 ENCOUNTER — TELEPHONE (OUTPATIENT)
Dept: OBSTETRICS AND GYNECOLOGY | Facility: CLINIC | Age: 34
End: 2024-07-24
Payer: COMMERCIAL

## 2024-07-24 NOTE — PROGRESS NOTES
Outreach call to patient following up on appointment with OBGYN.  Discussed appointment, reviewed medications, and discussed plan of care. Patient needs new PCP, CM to assist. Appointment pending with follow-up urogyn, will need in 2 weeks. Patient with no additional questions at this time.  Will continue to follow.       Addendum: Scheduled patient with new PCP appt. Urogyn appt scheduled by office earlier today. LVM to notify patient, awaiting callback.

## 2024-07-26 ENCOUNTER — OFFICE VISIT (OUTPATIENT)
Dept: OBSTETRICS AND GYNECOLOGY | Facility: CLINIC | Age: 34
End: 2024-07-26
Payer: COMMERCIAL

## 2024-07-26 VITALS
HEART RATE: 80 BPM | BODY MASS INDEX: 26.06 KG/M2 | HEIGHT: 61 IN | SYSTOLIC BLOOD PRESSURE: 111 MMHG | WEIGHT: 138 LBS | DIASTOLIC BLOOD PRESSURE: 78 MMHG

## 2024-07-26 DIAGNOSIS — Z87.59 HISTORY OF ECLAMPSIA: ICD-10-CM

## 2024-07-26 DIAGNOSIS — N90.89 VULVAR IRRITATION: Primary | ICD-10-CM

## 2024-07-26 DIAGNOSIS — F41.9 ANXIETY: ICD-10-CM

## 2024-07-26 PROCEDURE — 99214 OFFICE O/P EST MOD 30 MIN: CPT | Performed by: OBSTETRICS & GYNECOLOGY

## 2024-07-26 PROCEDURE — 87205 SMEAR GRAM STAIN: CPT | Performed by: OBSTETRICS & GYNECOLOGY

## 2024-07-26 PROCEDURE — 3008F BODY MASS INDEX DOCD: CPT | Performed by: OBSTETRICS & GYNECOLOGY

## 2024-07-26 PROCEDURE — 1036F TOBACCO NON-USER: CPT | Performed by: OBSTETRICS & GYNECOLOGY

## 2024-07-26 RX ORDER — TRIAMCINOLONE ACETONIDE 1 MG/G
OINTMENT TOPICAL 2 TIMES DAILY PRN
Qty: 42 G | Refills: 1 | Status: SHIPPED | OUTPATIENT
Start: 2024-07-26

## 2024-07-26 ASSESSMENT — PAIN SCALES - GENERAL: PAINLEVEL: 6

## 2024-07-26 NOTE — PROGRESS NOTES
[facility] Department of Urogynecology   Tahmina Mahoney MD, MPH   [phone]    ASSESSMENT AND PLAN:   34 y.o. female presenting in the [facility] clinic following 3b perineal laceration s/p vaginal delivery on 7/5/24.         Diagnoses:  #1 3b perineal laceration    #2 Breast feeding  #3 Vulvar irritation    #4 pp anxiety    Plan:   1. 3b perineal laceration, postpartum visit,   - incision appears much improved since last visit, less inflammation   - She is healing on exam today. It can take a few more weeks before she has fully healed after her laceration.   - Given list of physical therapists with their corresponding locations/contact information.    2. breast feeding   - Advised her to use the slow flow bottle when bottle feeding to prevent her baby being frustrated with breastfeeding.      3. Vulvar irritation   - Suture material was trimmed today to help with the itching and irritation. Her itching is likely related to the healing process. Sutures lose half their strength and fall out at 1 month. In a few weeks, she can trim her pubic hair.   - Sent rx for Triamcinolone 0.1% ointment - she can use this at bedtime for irritation  - Use Aquaphor and ice packs (wrap them in wash cloths to avoid irritation from pads) PRN.  We discussed that she may do a Sitz bath 3x daily and do one right before bed to help with nighttime itching. Counseled her that dermaplast can be irritating.   - Swab taken to rule out yeast and BV.   - Given list of vulvar care measures to look over.     4. Pp anxiety  - referral given at last visit for behavioral health with Isamar Gill     Follow-up in 1 month with Dr. Mahoney.     Scribe Attestation:   Odilia WILSON, raymond scribing for virtually, and in the presence of Tahmina Mahoney MD MPH on 7/26/24 at 2:25 PM.     Problem List Items Addressed This Visit    None  Visit Diagnoses       Vulvar irritation    -  Primary    Relevant Medications    triamcinolone (Kenalog) 0.1 %  ointment    Other Relevant Orders    Vaginitis Gram Stain For Bacterial Vaginosis + Yeast           I spent a total of 30 minutes in face to face and non face to face time.     I Dr. Mahoney, personally performed the services described in the documentation as scribed in my presence and confirm it is both complete and accurate.  Tahmina Mahoney MD, MPH, FACOG       Tahmina Mahoney MD, MPH, FACOG     Established    HISTORY OF PRESENT ILLNESS:     Rex Murphy is a 34 y.o. female who presents for follow up on 3b perineal laceration.     Record Review:   - 24 Dr. Mahoney note:  3b perineal laceration - superficial separation at posterior introitus and some swelling, use Sitz bath, ice packs, donut pillows, and Proctofoam. Take Ibuprofen and Tylenol for pain. Rx: Augmentin. Referred to PFPT. Referred to [facility] for anxiety. Follows with OB for eclampsia. She is breastfeeding and baby had a tongue tie,   - 24 Discharge Summary for Eclampsia: Now postpartum day #8 from [facility], readmitted with persistent severe range blood pressures. Patient had previously had  on , complicated by 3b lac, had 1 mild range blood pressure. Was routinely discharged home on , then on 7/10 represented after a suspected eclamptic seizure at [facility]. Received 24 hrs pp Mg, was normotensive, did not require PO BP meds. Had HA that resolved, MRI/A/V and EEG and neuro consult, negative. Prior HELLP labs n/f mild transaminitis that resolved prior to dc.  7/10, s/p normal echo . Patient then represented this admission on  with severe range Bps requiring IV treatment with hydral 5, was started on nifed 30 and a course of lasix. On  was normotensive, asymptomatic, and stable for discharge home with return precautions.     Postpartum Symptoms:  - Finished her Augmentin rx.   - She doesn't have bleeding anymore.    - Lactation thinks her baby has a tongue tie because he won't latch on to her. Her baby  is gaining weight. She tries to breastfeed with a nipple shield, but she feels like her baby gets upset because her flow isn't fast enough. Uses Lansinoh bottles.      Vulvar Symptoms:   - Endorses itching that is disruptive to sleep.   - She didn't use Proctofoam because insurance doesn't cover it.   - Applies Aquaphor and uses ice packs, which helps some.   - For 2 days, she's had very bothersome itching at night.   - Pt has burning with sitting. Sitting on a pillow helps.    - Sitz baths helps for a few minutes.   - Itching extends down to her perineum.   - Stopped using Dermaplast, but then tried using it again due to itching.   - She no longer uses witch hazel.   - Reports pads are also irritating to her skin.     Past Medical History:     Past Medical History:   Diagnosis Date    Syncope and collapse 09/02/2021    Near syncope          Past Surgical History:     Past Surgical History:   Procedure Laterality Date    OTHER SURGICAL HISTORY  09/02/2021    No history of surgery         Medications:     Prior to Admission medications    Medication Sig Start Date End Date Taking? Authorizing Provider   blood-glucose meter (Accu-Chek Guide Glucose Meter) misc Use for testing blood sugar during pregnancy 4/25/24  Yes DELICIA Gooden   famotidine (Pepcid) 20 mg tablet Take 1 tablet (20 mg) by mouth once daily. 12/7/23 12/6/24 Yes DELICIA Gooden   ferrous sulfate 325 (65 Fe) MG EC tablet Take 1 tablet by mouth 2 times a day. Take 1 tablet twice daily every other day- with orange juice or other high citrus beverage or food 4/19/24 4/19/25 Yes DELICIA Gooden   lancets (Accu-Chek Softclix Lancets) misc Use 1 lancet 4-6 times per day during pregnancy 5/20/24  Yes DELICIA Gutierrez, APRN-CNP   NIFEdipine ER (Adalat CC) 30 mg 24 hr tablet Take 1 tablet (30 mg) by mouth once every 24 hours. Do not crush, chew, or split. 7/13/24  Yes Yamilet Mott MD   polyethylene glycol  "(Glycolax, Miralax) 17 gram packet Take 17 g by mouth 2 times a day as needed (first line). 7/11/24  Yes Yamilet Farnsworth MD   amoxicillin-pot clavulanate (Augmentin) 875-125 mg tablet Take 1 tablet (875 mg) by mouth 2 times a day for 7 days. 7/18/24 7/25/24  Yulissa Morgan MD   furosemide (Lasix) 20 mg tablet Take 1 tablet (20 mg) by mouth once daily for 4 doses.  Patient not taking: Reported on 7/18/2024 7/14/24 7/18/24  Yamilet Mott MD   hydrocortisone-pramoxine (Proctofoam HC) rectal foam Insert 1 applicator into the rectum 2 times a day. Do not insert into the rectum. Place a small pea sized amount on your finger and onto perineum  Patient not taking: Reported on 7/26/2024 7/23/24   Yulissa Morgan MD   magnesium hydroxide (Milk of Magnesia) 400 mg/5 mL suspension Take 10 mL by mouth every 24 (twenty four) hours if needed for constipation.  Patient not taking: Reported on 7/17/2024 7/11/24   Yamilet Farnsworth MD   triamcinolone (Kenalog) 0.1 % ointment Apply topically 2 times a day as needed for irritation. 7/26/24   Tahmina Mahoney MD MPH   pramoxine (Proctofoam) 1 % foam Insert into the rectum every 2 hours if needed for irritation for up to 10 days. Do not insert into the rectum. Use a small amount of foam on the perineum daily 7/18/24 7/23/24  Yulissa Morgan MD        ROS  Review of Systems       PHYSICAL EXAM:    /78   Pulse 80   Ht 1.549 m (5' 1\")   Wt 62.6 kg (138 lb)   LMP 10/05/2023 (Exact Date)   BMI 26.07 kg/m²   Patient's last menstrual period was 10/05/2023 (exact date).    Declines chaperone for physical exam.      Well developed, well nourished, in no apparent distress.   Neurologic/Psychiatric:  Awake, Alert and Oriented times 3.  Affect normal.     GENITAL/URINARY:     External Genitalia:  The patient has normal appearing external genitalia, normal skenes and bartholins glands, and a normal hair distribution.  Her vulva is without lesions, erythema or discharge.  " Perinium well approximated- slight amount of swelling on the right side of perineum but no erythema or induration. It is non-tender with appropriate sensation. Suture material was trimmed. No signs of infection.     Urethral Meatus:  Size normal, Location normal, Lesions absent, Prolapse absent.    Urethra:  Fullness absent, Masses absent.    Bladder:  Fullness absent, Masses absent, Tenderness absent.    Vagina:  General appearance normal, Estrogen effect normal, Discharge absent, Lesions absent. Posterior wall is normal.     Anus/Perineum:  Lesions absent and Masses absent, visually concentric anal squeeze   Normal Perineum      Data and DIAGNOSTIC STUDIES REVIEWED      Lab Results   Component Value Date    URINECULTURE No significant growth 07/09/2024      Lab Results   Component Value Date    GLUCOSE 72 (L) 07/13/2024    CALCIUM 8.2 (L) 07/13/2024     07/13/2024    K 3.7 07/13/2024    CO2 22 07/13/2024     07/13/2024    BUN 12 07/13/2024    CREATININE 0.48 (L) 07/13/2024     Lab Results   Component Value Date    WBC 6.3 07/13/2024    HGB 10.0 (L) 07/13/2024    HCT 31.7 (L) 07/13/2024    MCV 92 07/13/2024     07/13/2024

## 2024-07-26 NOTE — PATIENT INSTRUCTIONS
SOME SUGGESTED VULVAR PAIN & ITCHING MEASURES     The vulva is the external genitalia in the female. The skin of the vulva can be quite sensitive. Because it is moist and frequently subjected to friction while sitting and moving, this area can be easily injured. There are various strategies that can be used to prevent irritation and allow the vulva to heal. Keeping this area dry can accelerate healing. Chemicals found in toilet tissues, laundry soaps and detergents that come in contact with the vulva can cause irritation.   Avoiding contact with potential irritants that contain chemicals is important. Fabric softeners in undergarments, chemicals in deodorant soaps, bubble baths, feminine hygiene spray and panty liners etc., can all cause irritation to the vulva. The following recommendations are specific measures that can help minimize vulvar irritation.   Wear white 100% cotton underwear and do not wear underwear at night. Do not wear pantyhose, tights, or other close-fitting clothes. Enclosing this area with synthetic fibers holds both heat and moisture in the skin, conditions which potentiate the development of infections.   Tight-fitting clothes may also increase your symptoms of discomfort.   After washing underwear, put it through at least one whole cycle with water only. Some women have suffered needlessly from irritants in detergents whose residue was left in clothes by incomplete rinsing. Rinsing clothes thoroughly is more important than which detergent is used although to be on the safe side, the milder the soap, the better. Wash new underwear before wearing. Fabric softeners and dryer sheets should not be used.   Rinse skin off with plain water frequently. Use tap water, distilled water, sitz baths, squirt bottles, or bidets. Additionally, hand held showers can be helpful for rinsing the vulva. After rinsing, pat the skin gently dry.   If the anus is irritated, consider cutting white flannel squares and  placing the square in warm water. Wipe the anus with the wet flannel and discard or wash the flannel.   Use very mild soap for bathing. It is best not to use any soaps on the vulva. The vulva should be rinsed with warm water. Unscented soaps or soaps for sensitive skin are best to use on the body. Special soap products can be found at pharmacies or health food stores. Remember that frequent baths with soaps may increase the irritation.   A compress of oiled Aveeno (a powdered oatmeal bath treatment) has been recommended by some. It is placed over the vulva three to four times a day. Put two tablespoons of Aveeno in one quart of water. Mix in a jar and refrigerate. This is often helpful after intercourse or when symptoms of burning and itching are present.   Some patients find the use of cool gel packs on the vulva to be helpful.   Do not use products with benzocaine.   Consider using 100% cotton menstrual pads and tampons. Many women with vulvar pain experience a significant increase in irritation and pain every month when they use commercial paper pads or tampons. This monthly increase in pain can often be reduced by using 100% washable and reusable cotton menstrual pads. Pure cotton tampons are available.   Don’t sit or remain in a wet bathing suit for prolonged periods.   Additionally, it is often recommended that the vulva is left uncovered at night (i.e. no underwear) to allow adequate exposure to the air.       Adapted From:   The Vulvar Pain Foundation, “Natural and Prophylactic Measures Suggested”, Vulvar Pain   Newsletter 1993: Sprin-6   The Interstitial Cy[employer] Association Vulvar Pain handout

## 2024-07-27 LAB
BACTERIAL VAGINOSIS VAG-IMP: NORMAL
CLUE CELLS VAG LPF-#/AREA: NORMAL /[LPF]
NUGENT SCORE: 4
YEAST VAG WET PREP-#/AREA: NORMAL

## 2024-07-29 ENCOUNTER — TELEMEDICINE (OUTPATIENT)
Dept: BEHAVIORAL HEALTH | Facility: CLINIC | Age: 34
End: 2024-07-29
Payer: COMMERCIAL

## 2024-07-29 DIAGNOSIS — F43.22 ADJUSTMENT DISORDER WITH ANXIETY: Primary | ICD-10-CM

## 2024-07-29 PROCEDURE — 90791 PSYCH DIAGNOSTIC EVALUATION: CPT | Performed by: PSYCHOLOGIST

## 2024-07-29 NOTE — PROGRESS NOTES
START TIME: 2:00  END TIME: 2:50  TOTAL TIME FACE TO FACE: 50mins    Subjective   Patient ID: Rex Murphy is a 34 y.o. female who presents for   Problem List Items Addressed This Visit       Adjustment disorder with anxiety - Primary   .    Virtual or Telephone Consent    An interactive audio and video telecommunication system which permits real time communications between the patient (at the originating site) and provider (at the distant site) was utilized to provide this telehealth service.   Verbal consent was requested and obtained from Rex Murphy on this date, 07/29/24 for a telehealth visit.      CONTENT OF SESSION:   This was an Initial appointment between provider and patient to complete a psychological diagnostic evaluation of symptoms of trauma.  Provider reviewed limits of confidentiality with patient; patient expressed an understanding. Provider conducted a psychological evaluation interview and collected psychosocial history information from patient.     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 She is  and she is about 3 weeks postpartum with baby boy. Her mother is currently staying with her to provide some /postpartum support.    SELF-REPORT OF SYMPTOMS:  Rex reported exposure to traumatic childbirth and early postpartum experience. She had third degree tear and vaginal delivery and was later hospitalized for eclampsia. She is now home with her son and family.    Rex reported normative psychological response to trauma, though had some adjustment symptoms (e.g., difficulty sleeping; anxiety). She reported her initial distress has been steadily improving. Her sleep has been improving. She denied symptoms consistent with acute stress disorder or postpartum depression or anxiety. She reported positive bond with baby.    Provider gave psychoeducation on postpartum Ptsd and associated risk and protective factors (e.g., prioritizing sleep and positive  social support as protective factors).  Rex requested resource information regarding patient advocacy office (number was provided).        Objective   Social History     Substance and Sexual Activity   Alcohol Use Never      Social History     Social History Narrative    Not on file        Assessment/Plan   Problem List Items Addressed This Visit       Adjustment disorder with anxiety - Primary     PLAN: Rex declined followup at this time. She was informed she may make a followup appointment should she experience increase in symptoms or distress.

## 2024-08-01 ENCOUNTER — LAB (OUTPATIENT)
Dept: LAB | Facility: LAB | Age: 34
End: 2024-08-01
Payer: COMMERCIAL

## 2024-08-01 ENCOUNTER — OFFICE VISIT (OUTPATIENT)
Dept: PRIMARY CARE | Facility: CLINIC | Age: 34
End: 2024-08-01
Payer: COMMERCIAL

## 2024-08-01 VITALS
HEART RATE: 99 BPM | TEMPERATURE: 97.8 F | WEIGHT: 137 LBS | RESPIRATION RATE: 18 BRPM | HEIGHT: 61 IN | BODY MASS INDEX: 25.86 KG/M2 | OXYGEN SATURATION: 99 % | SYSTOLIC BLOOD PRESSURE: 118 MMHG | DIASTOLIC BLOOD PRESSURE: 62 MMHG

## 2024-08-01 DIAGNOSIS — D50.9 IRON DEFICIENCY ANEMIA, UNSPECIFIED IRON DEFICIENCY ANEMIA TYPE: ICD-10-CM

## 2024-08-01 DIAGNOSIS — O15.9: ICD-10-CM

## 2024-08-01 DIAGNOSIS — O15.9: Primary | ICD-10-CM

## 2024-08-01 LAB
ALBUMIN SERPL-MCNC: 4.3 G/DL (ref 3.5–5)
ALP BLD-CCNC: 90 U/L (ref 35–125)
ALT SERPL-CCNC: 22 U/L (ref 5–40)
ANION GAP SERPL CALC-SCNC: 10 MMOL/L
AST SERPL-CCNC: 24 U/L (ref 5–40)
BILIRUB SERPL-MCNC: 0.3 MG/DL (ref 0.1–1.2)
BUN SERPL-MCNC: 19 MG/DL (ref 8–25)
CALCIUM SERPL-MCNC: 10 MG/DL (ref 8.5–10.4)
CHLORIDE SERPL-SCNC: 105 MMOL/L (ref 97–107)
CO2 SERPL-SCNC: 27 MMOL/L (ref 24–31)
CREAT SERPL-MCNC: 0.7 MG/DL (ref 0.4–1.6)
EGFRCR SERPLBLD CKD-EPI 2021: >90 ML/MIN/1.73M*2
ERYTHROCYTE [DISTWIDTH] IN BLOOD BY AUTOMATED COUNT: 13.9 % (ref 11.5–14.5)
GLUCOSE SERPL-MCNC: 84 MG/DL (ref 65–99)
HCT VFR BLD AUTO: 39.9 % (ref 36–46)
HGB BLD-MCNC: 12.7 G/DL (ref 12–16)
MCH RBC QN AUTO: 28.8 PG (ref 26–34)
MCHC RBC AUTO-ENTMCNC: 31.8 G/DL (ref 32–36)
MCV RBC AUTO: 91 FL (ref 80–100)
NRBC BLD-RTO: 0 /100 WBCS (ref 0–0)
PLATELET # BLD AUTO: 341 X10*3/UL (ref 150–450)
POTASSIUM SERPL-SCNC: 4.4 MMOL/L (ref 3.4–5.1)
PROT SERPL-MCNC: 7 G/DL (ref 5.9–7.9)
RBC # BLD AUTO: 4.41 X10*6/UL (ref 4–5.2)
SODIUM SERPL-SCNC: 142 MMOL/L (ref 133–145)
WBC # BLD AUTO: 6 X10*3/UL (ref 4.4–11.3)

## 2024-08-01 PROCEDURE — 80053 COMPREHEN METABOLIC PANEL: CPT

## 2024-08-01 PROCEDURE — 99204 OFFICE O/P NEW MOD 45 MIN: CPT | Performed by: NURSE PRACTITIONER

## 2024-08-01 PROCEDURE — 3008F BODY MASS INDEX DOCD: CPT | Performed by: NURSE PRACTITIONER

## 2024-08-01 PROCEDURE — 1036F TOBACCO NON-USER: CPT | Performed by: NURSE PRACTITIONER

## 2024-08-01 PROCEDURE — 36415 COLL VENOUS BLD VENIPUNCTURE: CPT

## 2024-08-01 PROCEDURE — 85027 COMPLETE CBC AUTOMATED: CPT

## 2024-08-01 ASSESSMENT — ENCOUNTER SYMPTOMS
MUSCULOSKELETAL NEGATIVE: 1
HYPERTENSION: 1
RESPIRATORY NEGATIVE: 1
GASTROINTESTINAL NEGATIVE: 1
CARDIOVASCULAR NEGATIVE: 1
CONSTITUTIONAL NEGATIVE: 1

## 2024-08-01 ASSESSMENT — PATIENT HEALTH QUESTIONNAIRE - PHQ9
1. LITTLE INTEREST OR PLEASURE IN DOING THINGS: NOT AT ALL
2. FEELING DOWN, DEPRESSED OR HOPELESS: NOT AT ALL
SUM OF ALL RESPONSES TO PHQ9 QUESTIONS 1 AND 2: 0

## 2024-08-01 ASSESSMENT — PAIN SCALES - GENERAL: PAINLEVEL: 0-NO PAIN

## 2024-08-01 NOTE — PROGRESS NOTES
"Chief Complaint  Rex Murphy is a 34 y.o. female presenting for \"Hypertension ([facility] care- high blood pressure after pregnancy- had a seizure the day after discharge /States she was started on bp and it has been better but two day was low and she did not take her bp meds ).\"    Hypertension         Rex Murphy is a 34 y.o. female presenting for       Past Medical History  Patient Active Problem List    Diagnosis Date Noted    Adjustment disorder with anxiety 07/29/2024    Anxiety 07/18/2024    Eclampsia ([facility]) 07/10/2024    Dyspnea on exertion 07/04/2024    Gastroesophageal reflux disease 07/04/2024    Anemia affecting pregnancy in third trimester ([facility]) 04/19/2024    History of gestational diabetes mellitus (GDM) 04/19/2024    Asymptomatic microscopic hematuria 12/07/2023    Gastritis 12/07/2023    High-tone pelvic floor dysfunction 12/07/2023    Family history of neural tube defect 12/07/2023        Medications  Current Outpatient Medications   Medication Instructions    blood-glucose meter (Accu-Chek Guide Glucose Meter) misc Use for testing blood sugar during pregnancy    lancets (Accu-Chek Softclix Lancets) misc Use 1 lancet 4-6 times per day during pregnancy    NIFEdipine ER (ADALAT CC) 30 mg, oral, Every 24 hours, Do not crush, chew, or split.        Surgical History  She has a past surgical history that includes Other surgical history (09/02/2021).     Social History  She reports that she has never smoked. She has never been exposed to tobacco smoke. She has never used smokeless tobacco. She reports that she does not drink alcohol and does not use drugs.    Family History  Family History   Problem Relation Name Age of Onset    Asthma Mother      Hypertension Father          Allergies  Amoxicillin and Cefazolin in dextrose (iso-os)    ROS  Review of Systems   Constitutional: Negative.    Respiratory: Negative.     Cardiovascular: Negative.    Gastrointestinal: Negative.    Genitourinary: Negative.  "   Musculoskeletal: Negative.         Last Recorded Vitals  /62 (BP Location: Right arm, Patient Position: Sitting, BP Cuff Size: Adult)   Pulse 99   Temp 36.6 °C (97.8 °F)   Resp 18   Wt 62.1 kg (137 lb)   SpO2 99%     Physical Exam  Vitals and nursing note reviewed.   Constitutional:       Appearance: Normal appearance.   Eyes:      Pupils: Pupils are equal, round, and reactive to light.   Cardiovascular:      Rate and Rhythm: Normal rate and regular rhythm.      Pulses: Normal pulses.      Heart sounds: Normal heart sounds.   Pulmonary:      Effort: Pulmonary effort is normal.      Breath sounds: Normal breath sounds.   Neurological:      Mental Status: She is alert.         Relevant Results      Assessment/Plan   Rex was seen today for hypertension.  Diagnoses and all orders for this visit:  Eclampsia ([facility]) (Primary)  -     Referral to Primary Care  -     Comprehensive Metabolic Panel; Future  Iron deficiency anemia, unspecified iron deficiency anemia type  -     CBC; Future          COUNSELING      Medication education:              Education:  The patient is counseled regarding potential side-effects of any and all new medications             Understanding:  Patient expressed understanding             Adherence:  No barriers to adherence identified        Krystle Ding, APRN-CNP

## 2024-08-02 ENCOUNTER — TELEPHONE (OUTPATIENT)
Dept: OBSTETRICS AND GYNECOLOGY | Facility: CLINIC | Age: 34
End: 2024-08-02
Payer: COMMERCIAL

## 2024-08-02 ENCOUNTER — TELEPHONE (OUTPATIENT)
Dept: OBSTETRICS AND GYNECOLOGY | Facility: HOSPITAL | Age: 34
End: 2024-08-02
Payer: COMMERCIAL

## 2024-08-02 DIAGNOSIS — N61.0 MASTITIS: Primary | ICD-10-CM

## 2024-08-02 RX ORDER — CLINDAMYCIN HYDROCHLORIDE 150 MG/1
450 CAPSULE ORAL 3 TIMES DAILY
Qty: 90 CAPSULE | Refills: 0 | Status: SHIPPED | OUTPATIENT
Start: 2024-08-02 | End: 2024-08-12

## 2024-08-02 NOTE — TELEPHONE ENCOUNTER
Called patient to discuss her concern for mastitis. Patient reports significant pain in R breast and associated fever with onset last night. Patient has amoxicillin and cefazolin allergies. Will treat mastitis with Clindamycin 450mg TID x 10 days. Rx sent.   Lisset Shukla, APRN-CNM, APRN-CNP

## 2024-08-02 NOTE — TELEPHONE ENCOUNTER
----- Message from Tammie MARIANO sent at 8/2/2024  2:08 PM EDT -----  Regarding: MEDICATION REQUEST  Good afternoon,    Pt is calling to request antibiotic pt states she think she has [employer]. Thanks

## 2024-08-07 ENCOUNTER — APPOINTMENT (OUTPATIENT)
Dept: OBSTETRICS AND GYNECOLOGY | Facility: CLINIC | Age: 34
End: 2024-08-07
Payer: COMMERCIAL

## 2024-08-10 ENCOUNTER — HOSPITAL ENCOUNTER (EMERGENCY)
Facility: HOSPITAL | Age: 34
Discharge: HOME | End: 2024-08-10
Attending: EMERGENCY MEDICINE
Payer: COMMERCIAL

## 2024-08-10 ENCOUNTER — HOSPITAL ENCOUNTER (EMERGENCY)
Facility: HOSPITAL | Age: 34
Discharge: HOME | End: 2024-08-10
Attending: STUDENT IN AN ORGANIZED HEALTH CARE EDUCATION/TRAINING PROGRAM
Payer: COMMERCIAL

## 2024-08-10 ENCOUNTER — DOCUMENTATION (OUTPATIENT)
Dept: OBSTETRICS AND GYNECOLOGY | Facility: HOSPITAL | Age: 34
End: 2024-08-10
Payer: COMMERCIAL

## 2024-08-10 VITALS
OXYGEN SATURATION: 97 % | RESPIRATION RATE: 16 BRPM | BODY MASS INDEX: 25.89 KG/M2 | SYSTOLIC BLOOD PRESSURE: 104 MMHG | TEMPERATURE: 97 F | DIASTOLIC BLOOD PRESSURE: 71 MMHG | WEIGHT: 137 LBS | HEART RATE: 78 BPM

## 2024-08-10 VITALS
WEIGHT: 137 LBS | BODY MASS INDEX: 25.86 KG/M2 | HEART RATE: 79 BPM | RESPIRATION RATE: 18 BRPM | TEMPERATURE: 97.2 F | SYSTOLIC BLOOD PRESSURE: 130 MMHG | DIASTOLIC BLOOD PRESSURE: 85 MMHG | HEIGHT: 61 IN | OXYGEN SATURATION: 98 %

## 2024-08-10 DIAGNOSIS — M62.838 MUSCLE SPASM: Primary | ICD-10-CM

## 2024-08-10 DIAGNOSIS — O15.9: Primary | ICD-10-CM

## 2024-08-10 LAB
ALBUMIN SERPL BCP-MCNC: 4.2 G/DL (ref 3.4–5)
ALP SERPL-CCNC: 59 U/L (ref 33–110)
ALT SERPL W P-5'-P-CCNC: 18 U/L (ref 7–45)
ANION GAP SERPL CALC-SCNC: 12 MMOL/L (ref 10–20)
AST SERPL W P-5'-P-CCNC: 16 U/L (ref 9–39)
BASOPHILS # BLD AUTO: 0.03 X10*3/UL (ref 0–0.1)
BASOPHILS NFR BLD AUTO: 0.5 %
BILIRUB SERPL-MCNC: 0.4 MG/DL (ref 0–1.2)
BUN SERPL-MCNC: 14 MG/DL (ref 6–23)
CALCIUM SERPL-MCNC: 9.4 MG/DL (ref 8.6–10.3)
CHLORIDE SERPL-SCNC: 106 MMOL/L (ref 98–107)
CO2 SERPL-SCNC: 25 MMOL/L (ref 21–32)
CREAT SERPL-MCNC: 0.52 MG/DL (ref 0.5–1.05)
EGFRCR SERPLBLD CKD-EPI 2021: >90 ML/MIN/1.73M*2
EOSINOPHIL # BLD AUTO: 0.16 X10*3/UL (ref 0–0.7)
EOSINOPHIL NFR BLD AUTO: 2.7 %
ERYTHROCYTE [DISTWIDTH] IN BLOOD BY AUTOMATED COUNT: 14 % (ref 11.5–14.5)
GLUCOSE SERPL-MCNC: 92 MG/DL (ref 74–99)
HCT VFR BLD AUTO: 37.6 % (ref 36–46)
HGB BLD-MCNC: 12.6 G/DL (ref 12–16)
IMM GRANULOCYTES # BLD AUTO: 0.1 X10*3/UL (ref 0–0.7)
IMM GRANULOCYTES NFR BLD AUTO: 1.7 % (ref 0–0.9)
LYMPHOCYTES # BLD AUTO: 1.91 X10*3/UL (ref 1.2–4.8)
LYMPHOCYTES NFR BLD AUTO: 32.3 %
MAGNESIUM SERPL-MCNC: 2.24 MG/DL (ref 1.6–2.4)
MCH RBC QN AUTO: 29 PG (ref 26–34)
MCHC RBC AUTO-ENTMCNC: 33.5 G/DL (ref 32–36)
MCV RBC AUTO: 87 FL (ref 80–100)
MONOCYTES # BLD AUTO: 0.29 X10*3/UL (ref 0.1–1)
MONOCYTES NFR BLD AUTO: 4.9 %
NEUTROPHILS # BLD AUTO: 3.42 X10*3/UL (ref 1.2–7.7)
NEUTROPHILS NFR BLD AUTO: 57.9 %
NRBC BLD-RTO: 0 /100 WBCS (ref 0–0)
PLATELET # BLD AUTO: 306 X10*3/UL (ref 150–450)
POTASSIUM SERPL-SCNC: 3.9 MMOL/L (ref 3.5–5.3)
PROT SERPL-MCNC: 7.2 G/DL (ref 6.4–8.2)
RBC # BLD AUTO: 4.34 X10*6/UL (ref 4–5.2)
SODIUM SERPL-SCNC: 139 MMOL/L (ref 136–145)
WBC # BLD AUTO: 5.9 X10*3/UL (ref 4.4–11.3)

## 2024-08-10 PROCEDURE — 96360 HYDRATION IV INFUSION INIT: CPT

## 2024-08-10 PROCEDURE — 83735 ASSAY OF MAGNESIUM: CPT | Performed by: EMERGENCY MEDICINE

## 2024-08-10 PROCEDURE — 99284 EMERGENCY DEPT VISIT MOD MDM: CPT

## 2024-08-10 PROCEDURE — 36415 COLL VENOUS BLD VENIPUNCTURE: CPT | Performed by: EMERGENCY MEDICINE

## 2024-08-10 PROCEDURE — 80051 ELECTROLYTE PANEL: CPT | Performed by: EMERGENCY MEDICINE

## 2024-08-10 PROCEDURE — 2500000004 HC RX 250 GENERAL PHARMACY W/ HCPCS (ALT 636 FOR OP/ED): Performed by: EMERGENCY MEDICINE

## 2024-08-10 PROCEDURE — 85025 COMPLETE CBC W/AUTO DIFF WBC: CPT | Performed by: EMERGENCY MEDICINE

## 2024-08-10 PROCEDURE — 99283 EMERGENCY DEPT VISIT LOW MDM: CPT | Mod: 25

## 2024-08-10 RX ORDER — HEPARIN SODIUM 10000 [USP'U]/100ML
0-4000 INJECTION, SOLUTION INTRAVENOUS CONTINUOUS
Status: DISCONTINUED | OUTPATIENT
Start: 2024-08-10 | End: 2024-08-10

## 2024-08-10 RX ADMIN — SODIUM CHLORIDE 1000 ML: 9 INJECTION, SOLUTION INTRAVENOUS at 11:59

## 2024-08-10 ASSESSMENT — PAIN - FUNCTIONAL ASSESSMENT
PAIN_FUNCTIONAL_ASSESSMENT: 0-10
PAIN_FUNCTIONAL_ASSESSMENT: 0-10

## 2024-08-10 ASSESSMENT — LIFESTYLE VARIABLES
TOTAL SCORE: 0
EVER HAD A DRINK FIRST THING IN THE MORNING TO STEADY YOUR NERVES TO GET RID OF A HANGOVER: NO
HAVE YOU EVER FELT YOU SHOULD CUT DOWN ON YOUR DRINKING: NO
HAVE PEOPLE ANNOYED YOU BY CRITICIZING YOUR DRINKING: NO
EVER FELT BAD OR GUILTY ABOUT YOUR DRINKING: NO

## 2024-08-10 ASSESSMENT — COLUMBIA-SUICIDE SEVERITY RATING SCALE - C-SSRS
2. HAVE YOU ACTUALLY HAD ANY THOUGHTS OF KILLING YOURSELF?: NO
2. HAVE YOU ACTUALLY HAD ANY THOUGHTS OF KILLING YOURSELF?: NO
6. HAVE YOU EVER DONE ANYTHING, STARTED TO DO ANYTHING, OR PREPARED TO DO ANYTHING TO END YOUR LIFE?: NO
6. HAVE YOU EVER DONE ANYTHING, STARTED TO DO ANYTHING, OR PREPARED TO DO ANYTHING TO END YOUR LIFE?: NO
1. IN THE PAST MONTH, HAVE YOU WISHED YOU WERE DEAD OR WISHED YOU COULD GO TO SLEEP AND NOT WAKE UP?: NO
1. IN THE PAST MONTH, HAVE YOU WISHED YOU WERE DEAD OR WISHED YOU COULD GO TO SLEEP AND NOT WAKE UP?: NO

## 2024-08-10 ASSESSMENT — PAIN SCALES - GENERAL
PAINLEVEL_OUTOF10: 0 - NO PAIN
PAINLEVEL_OUTOF10: 0 - NO PAIN

## 2024-08-10 ASSESSMENT — VISUAL ACUITY: VA_NORMAL: 1

## 2024-08-10 NOTE — ED PROVIDER NOTES
"HPI   Chief Complaint   Patient presents with    Headache     Feels like she is goingto have a seizure hx of ecclampsia and sz 5 weeks post partum         34-year-old  weeks postpartum imaginal delivery, prolonged labor, grade 2 laceration status post repair. She was hospitalized at [facility] after having a brief seizure in the emergency department. Blood pressure was elevated at time. She was started on blood pressure medication. Her blood pressure has been normalizing so she has been weaning off of the medication at her doctor's direction. She came in today because she started having twitching in her right hand. She is concerned that her magnesium levels below. No seizure or seizure like activity today. She never had a tonic clonic seizure, it was more of a twitching and \"stiffening\" sensation when she was at [facility]. Her blood pressure was normal throughout her pregnancy. She had no history of hypertension before this. She has been running around 110 systolic at home. No vaginally bleeding or discharge. No abdominal pain. She is breast-feeding. She is not currently on vitamins.              Patient History   Past Medical History:   Diagnosis Date    Syncope and collapse 2021    Near syncope     Past Surgical History:   Procedure Laterality Date    OTHER SURGICAL HISTORY  2021    No history of surgery     Family History   Problem Relation Name Age of Onset    Asthma Mother      Hypertension Father       Social History     Tobacco Use    Smoking status: Never     Passive exposure: Never    Smokeless tobacco: Never   Vaping Use    Vaping status: Never Used   Substance Use Topics    Alcohol use: Never    Drug use: Never       Physical Exam   ED Triage Vitals [08/10/24 1132]   Temperature Heart Rate Respirations BP   36.2 °C (97.2 °F) 79 18 130/85      Pulse Ox Temp src Heart Rate Source Patient Position   98 % -- -- --      BP Location FiO2 (%)     -- --       Physical Exam  Vitals and nursing " note reviewed.   Constitutional:       General: She is not in acute distress.     Appearance: She is well-developed.   HENT:      Head: Normocephalic and atraumatic.   Eyes:      Conjunctiva/sclera: Conjunctivae normal.   Cardiovascular:      Rate and Rhythm: Normal rate and regular rhythm.      Heart sounds: No murmur heard.  Pulmonary:      Effort: Pulmonary effort is normal. No respiratory distress.      Breath sounds: Normal breath sounds.   Abdominal:      Palpations: Abdomen is soft.      Tenderness: There is no abdominal tenderness.   Musculoskeletal:         General: No swelling.      Cervical back: Neck supple.   Skin:     General: Skin is warm and dry.      Capillary Refill: Capillary refill takes less than 2 seconds.   Neurological:      Mental Status: She is alert.   Psychiatric:         Mood and Affect: Mood normal.           ED Course & MDM   ED Course as of 08/11/24 0720   Sat Aug 10, 2024   1202 Her headache is mild at this time. She states she believes it is from crying about having to come back to the emergency department again. [CD]      ED Course User Index  [CD] Clarence Garcia MD         Diagnoses as of 08/11/24 0720   Muscle spasm                 No data recorded                                 Medical Decision Making  Neurologic exam was normal. EKG was interpreted by me. Sinus rhythm. Rate 75. No acute ischemic changes. No ST elevation. Normal QRS duration. No abnormalities.    Labs are within normal limits. Not in distress. Magnesium normal. Normal neurologic exam. No evidence of seizure seizure like activity. Her symptoms are essentially twitching in her hand. She had extensive workup at [facility]. Her blood pressure is normal here. She is normal neurologic exam. No clonus. She has follow-up this week with her PCP and OB/GYN. She is going to restart her prenatal vitamin. At this time, she appear safe or discharge home and close follow-up. She will return if  worse.            Procedure  Procedures     Clarence Garcia MD  08/11/24 0755

## 2024-08-10 NOTE — DISCHARGE INSTRUCTIONS
You have been evaluated in the Emergency Department today for:  1. Eclampsia ([facility])  Referral to Neurology          Please follow up with your neurologist within 1 week or schedule an appointment as soon as you can and discuss the results of your Emergency Department evaluation with them.    Return to the Emergency Department if you experience any worsening of your symptoms, inability to eat or drink, worsening or new symptoms (difficulty breathing, chest pain, or fever). Please also return if you are not feeling better or have had difficulty following up with an outpatient doctor within one week. You may call of visit the Emergency Department at any time.    Thank you for choosing us for your care.

## 2024-08-10 NOTE — ED PROVIDER NOTES
History of Present Illness     History provided by: Patient  Limitations to History: None  External Records Reviewed with Brief Summary: None    HPI:  Rex Murphy is a 34 y.o. female PMH  5 weeks PPM w/ hx of eclampsia, JAZMÍN, and recently diagnosed adjustment disorder presenting to the ED with similar prodrome to the eclamptic episode that involved right hand spasms and a generalized feeling of impending doom.   Patient reports having 1 witnessed seizure on 7/10/24 at [facility] ED days after the birth of her child. She states that it first started as right hand spasms and feeling of impending doom. She then reports that the entire right side of her body started to twitch and spasm including right side of face, right arm, and right leg. She cannot recall the events during the seizure but does not endorse typical post-ictal state involving confusion and sleepiness. At that time she was presumed eclamptic seizure despite normotension at that time treated empirically with magnesium sulfate. During admission course of admission from 7/10 - , she did have severe range of blood pressure that requiring IV treatment with hydral 5, was started on nifed 30 and a course of lasix. Neuro evaluated her at this time with MRI and EEG that both were normal.  Earlier today, she felt the same prodrome of R hand spasms and uncomfortableness that has been persistent throughout the day. She first went to [place] ED for evaluation. They discharged her after doing basic workup with normal magnesium and reassured her. However, symptoms still persisted and she contacted her OBGYN who advised her to come to the ED in order to receive neuro evaluation.   She endorses tension headaches associated with sleep deprivation. Denies aura, positional headaches, thunderclap. Denies lightheadedness dizziness. Denies numbness tingling in BUE and BLE. Denies weakness in extremities.          Physical Exam   Triage vitals:  T 36.7 °C (98 °F)  HR  83  /77  RR 16  O2 96 %      Physical Exam  Constitutional:       Appearance: Normal appearance.   HENT:      Head: Normocephalic.      Nose: No congestion.   Eyes:      Extraocular Movements: Extraocular movements intact.      Pupils: Pupils are equal, round, and reactive to light.   Cardiovascular:      Rate and Rhythm: Normal rate and regular rhythm.      Heart sounds: Normal heart sounds.   Pulmonary:      Effort: Pulmonary effort is normal.      Breath sounds: Normal breath sounds.   Abdominal:      General: Abdomen is flat.      Palpations: Abdomen is soft.   Musculoskeletal:         General: No swelling or tenderness.      Cervical back: No tenderness.      Right lower leg: No edema.      Left lower leg: No edema.   Neurological:      General: No focal deficit present.      Mental Status: She is alert and oriented to person, place, and time.      Cranial Nerves: No cranial nerve deficit.      Sensory: No sensory deficit.      Motor: No weakness.      Gait: Gait normal.      Deep Tendon Reflexes: Reflexes normal (brisk but symmetric and equal in BUE and BLE).      Episodic focal spasm causing pronounced right thumb extension and wrist extension     Medical Decision Making & ED Course   Medical Decision Makin y.o. female PMH hx of presumed eclamptic seizure presents with similar prodrome of R hand spasms and felling of impending doom. Patient vitals - afebrile, normotensive, with normal HR. Neurology was consulted for further recommendations to r/o eclamptic seizure vs. Other etiologies. Appreciating further recs.   ----      Differential diagnoses considered include but are not limited to: eclamptic seizure vs. Functional movement disorder vs. PNES      Social Determinants of Health which Significantly Impact Care: None identified     EKG Independent Interpretation: EKG not obtained    Independent Result Review and Interpretation: Relevant laboratory and radiographic results were reviewed and  independently interpreted by myself.  As necessary, they are commented on in the ED Course.    Chronic conditions affecting the patient's care: As documented above in [facility]    The patient was discussed with the following consultants/services: Neurology regarding evaluation of seizure prodrome    Care Considerations: As documented above in [facility]    ED Course:  ED Course as of 08/10/24 1842   Sat Aug 10, 2024   1716 Reached out to neuro for further evaluation of eclamptic seizure vs. Functional movement disorder [ER]   1717 Neuro will see patient. Inpatient consult order signed. Appreciate further recs. [ER]   1810 Attestation note  34-year-old  weeks postpartum imaginal delivery, prolonged labor, grade 2 laceration status post repair, eclampsia pw r hand twitching today (which was her trigger for initial seizure).  The patient had her only seizure occurred at [facility] when postpartum 7/10.  Did not have preeclampsia while she was pregnant.  During her evaluation for eclampsia she was given antihypertensive and discharged with antihypertensive medications.  She was not discharged with any magnesium treatment.  She had a normal EEG.  Today she was told to come to ED by GYN for neuro consult.  Has been noticing that her right arm is twitching and this was the prodrome of her prior seizure.  Not had any seizure-like activity.  Her initial seizure on 7/10 started with a scream, right arm flailed, then patient had generalized shaking which was witnessed by her family.  Per the patient she received some IV medication to abort the seizure.  She does not recall what happened afterwards.  She feels that she had a seizure and does not remember the entire event after the screen.  Delivery day 24 - today is 5wk and 1d from delivery  Exam:   /70. AAOX3.  Cranial nerves II to XII are intact.  Distal sensation intact throughout, 5 out of 5 strength in 4 extremities.    [facility]  34-year-old female history of eclampsia  presenting with right arm twitching which was prodrome of seizures before.  Sent in by GYN doctor for neurology evaluation.  Plan for neurology consult for seizure.  Patient already on antihypertensive nifedipine.  Blood pressure controlled.  Currently not in status.  No seizures today.  She had blood work earlier today.  Not Concerning for electrolyte or renal abnormalities. [FN]      ED Course User Index  [ER] Jamison Harper MD  [FN] Carmencita Su MD     Disposition   Patient was signed out to Dr. Eulalio Chakraborty at 1900 pending completion of their work-up.  Please see the next provider's transition of care note for the remainder of the patient's care.     Procedures   Procedures    Patient seen and discussed with ED attending physician.    Jamison Harper MD  Emergency Medicine     Jamison Harper MD  Resident  08/10/24 8054

## 2024-08-10 NOTE — HOSPITAL COURSE
Pt presents to the ED c/o R hand twitching. Pt states that she recently had a baby a month ago and the next day she had a seizure. Pt states that this AM when she woke up she noticed her R hand started twitching and this was a sign before she had the other seizure. Pt was see at [facility] today and Dc'd but she called her OB who asked her to come to the ER here for a neuro consult. Pt does not take seizure medications and has only ever had the one seizure.         She had a presumed eclamapsic seizure one-da post-partum on July 6th. Treated with Magnesium. MRI and EEG which were both unremarkable. She woke up today and saw the     Right hand spasms; thumbs up twitch, right wrist extnesion and then generalized sense of impending doom. [facility]    Treat empirically for a presumed eclmaptic seizure.

## 2024-08-10 NOTE — PROGRESS NOTES
Patient called regarding right hand twitching which she states happened immediately prior to her eclamptic seizure several weeks ago.   States she presented to the ED earlier today and was provided reassurance and recommended to take PNV.     She states she is still experiencing the same symptoms.  I told her that if she feels symptomatic she can present to the ED to be evaluated.  I told her I cannot provide any other recommendations over the phone nor I can I say whether or not this is a prodrome to another seizure.  I suggested she may be evaluated by neurology- but at this time without seeing her I do not have any additional recommendations aside from being evaluated.     Renetta Santos MD

## 2024-08-10 NOTE — ED TRIAGE NOTES
Pt presents to the ED c/o R hand twitching. Pt states that she recently had a baby a month ago and the next day she had a seizure. Pt states that this AM when she woke up she noticed her R hand started twitching and this was a sign before she had the other seizure. Pt was see at [facility] today and Dc'd but she called her OB who asked her to come to the ER here for a neuro consult. Pt does not take seizure medications and has only ever had the one seizure.

## 2024-08-11 NOTE — CONSULTS
"Consults  History Of Present Illness  Rex Murphy is a 34 y.o.  female now 5-weeks post-partum presenting with right hand twitching that started around 1000 this morning. Neurology was consulted to evaluate for eclamptic seizure. She reports the jerking movements occur intermittently throughout the day and stop when she grabs onto objects. She has also noticed some send subtle for movements. Systolic blood pressures today were 110s-130s. Last time she had this prodrome she also experienced \"melting away of her cheek\".   who is at bedside mentions she scrunching up her face with eye twitching lasting 30 to 40 seconds. At the time she was on Lasix and nifedipine. She has been on clindamycin in the last few days in the setting of mastitis.  She is currently afraid of holding her son and that she will drop him.  She endorses stressors related to  baby.    During the previous event in July, she had a similar presentation. She then had a 45 second to 1-minute seizure at OSH, resolved with 2mg Versed. Neurology was consulted and reported these movements did not appear like typical clonic tonic (i.e. epileptic) or myoclonic movements. She endorsed awareness of these movements (i.e. no loss of consciousness during these events). Overall, her story and exam suggest a large functional overlay, suspected in the setting of increased stress from recent delivery. Obtained an MRI brain without contrast to rule out seizures from a structural lesion and it was normal. Normal awake and asleep EEG on  normal. No events between these two presentations.    Epilepsy Risk Factors: None; No personal or family history of seizures.   Previous EEG results 2023: Normal awake and asleep EEG.    Past Medical History  Past Medical History:   Diagnosis Date    Syncope and collapse 2021    Near syncope     Surgical History  Past Surgical History:   Procedure Laterality Date    OTHER SURGICAL HISTORY  2021    No " history of surgery     Social History  Social History     Tobacco Use    Smoking status: Never     Passive exposure: Never    Smokeless tobacco: Never   Vaping Use    Vaping status: Never Used   Substance Use Topics    Alcohol use: Never    Drug use: Never     Allergies  Amoxicillin and Cefazolin in dextrose (iso-os)  (Not in a hospital admission)      Review of Systems  Neurological Exam  Mental Status   Speech is normal.    Cranial Nerves  CN II: Visual acuity is normal. Visual fields full to confrontation.  CN III, IV, VI: Extraocular movements intact bilaterally. Normal lids and orbits bilaterally. Pupils equal round and reactive to light bilaterally.  CN V: Facial sensation is normal.  CN VII: Full and symmetric facial movement.  CN VIII: Hearing is normal.  CN IX, X: Palate elevates symmetrically. Normal gag reflex.  CN XI: Shoulder shrug strength is normal.  CN XII: Tongue midline without atrophy or fasciculations.    Motor  Normal muscle bulk throughout. Normal muscle tone. The following abnormal movements were seen: Infrequent right thumb jerk, rare right wrist flexion.   Strength is 5/5 throughout all four extremities.    Coordination  Right: Finger-to-nose normal. Rapid alternating movement normal.Left: Finger-to-nose normal. Rapid alternating movement normal.    Physical Exam  Eyes:      General: Lids are normal.      Extraocular Movements: Extraocular movements intact.      Pupils: Pupils are equal, round, and reactive to light.   Neurological:      Motor: Motor strength is normal.  Psychiatric:         Speech: Speech normal.       Last Recorded Vitals  Blood pressure 104/71, pulse 78, temperature 36.1 °C (97 °F), resp. rate 16, weight 62.1 kg (137 lb), last menstrual period 10/05/2023, SpO2 97%, currently breastfeeding.    Relevant Results  Results for orders placed or performed during the hospital encounter of 08/10/24 (from the past 24 hour(s))   CBC and Auto Differential   Result Value Ref Range     WBC 5.9 4.4 - 11.3 x10*3/uL    nRBC 0.0 0.0 - 0.0 /100 WBCs    RBC 4.34 4.00 - 5.20 x10*6/uL    Hemoglobin 12.6 12.0 - 16.0 g/dL    Hematocrit 37.6 36.0 - 46.0 %    MCV 87 80 - 100 fL    MCH 29.0 26.0 - 34.0 pg    MCHC 33.5 32.0 - 36.0 g/dL    RDW 14.0 11.5 - 14.5 %    Platelets 306 150 - 450 x10*3/uL    Neutrophils % 57.9 40.0 - 80.0 %    Immature Granulocytes %, Automated 1.7 (H) 0.0 - 0.9 %    Lymphocytes % 32.3 13.0 - 44.0 %    Monocytes % 4.9 2.0 - 10.0 %    Eosinophils % 2.7 0.0 - 6.0 %    Basophils % 0.5 0.0 - 2.0 %    Neutrophils Absolute 3.42 1.20 - 7.70 x10*3/uL    Immature Granulocytes Absolute, Automated 0.10 0.00 - 0.70 x10*3/uL    Lymphocytes Absolute 1.91 1.20 - 4.80 x10*3/uL    Monocytes Absolute 0.29 0.10 - 1.00 x10*3/uL    Eosinophils Absolute 0.16 0.00 - 0.70 x10*3/uL    Basophils Absolute 0.03 0.00 - 0.10 x10*3/uL   Comprehensive Metabolic Panel   Result Value Ref Range    Glucose 92 74 - 99 mg/dL    Sodium 139 136 - 145 mmol/L    Potassium 3.9 3.5 - 5.3 mmol/L    Chloride 106 98 - 107 mmol/L    Bicarbonate 25 21 - 32 mmol/L    Anion Gap 12 10 - 20 mmol/L    Urea Nitrogen 14 6 - 23 mg/dL    Creatinine 0.52 0.50 - 1.05 mg/dL    eGFR >90 >60 mL/min/1.73m*2    Calcium 9.4 8.6 - 10.3 mg/dL    Albumin 4.2 3.4 - 5.0 g/dL    Alkaline Phosphatase 59 33 - 110 U/L    Total Protein 7.2 6.4 - 8.2 g/dL    AST 16 9 - 39 U/L    Bilirubin, Total 0.4 0.0 - 1.2 mg/dL    ALT 18 7 - 45 U/L   Magnesium   Result Value Ref Range    Magnesium 2.24 1.60 - 2.40 mg/dL                    Lilia Coma Scale  Best Eye Response: Spontaneous  Best Verbal Response: Oriented  Best Motor Response: Follows commands  Lilia Coma Scale Score: 15               I have personally reviewed the following imaging results Electrocardiogram, 12-lead PRN ACS symptoms    Result Date: 7/14/2024  Sinus bradycardia Cannot rule out Anterior infarct (cited on or before 12-JUL-2024) Abnormal ECG When compared with ECG of 12-JUL-2024 19:51, Vent.  rate has decreased BY  47 BPM QRS axis Shifted right Questionable change in initial forces of Anteroseptal leads QT has shortened Confirmed by Eduardo Kowalski ([id number]) on 2024 9:34:20 PM  .      Assessment/Plan   Active Problems:  There are no active Hospital Problems.  Rex Murphy is a 34 y.o.  female now 5-weeks post-partum presenting with right hand twitching that started around 5-6 hours prior to presentation. Neurology was consulted to evaluate for eclamptic seizure. Her movements are not characteristic of myoclonic seizures and are suppressible with holding onto objects. She has maintained awareness during this extensive events and no epilepsy risk factors. Previous EEG and MRI were normal. Therefore, , and the  thus decreasing clinical suspicion for seizures.     Recommendations:  - No acute interventions or work-up recommended at this time.  - Return to ED for movements that are not suppressible, if they're continuous and stereotyped/consistent, if they generalize, or if she loses awareness   - Outpatient follow up with Dr. Rodríguez    Patient discussed with attending physician, Dr. Rodríguez.     Kim Sherman MD  Child Neurology PGY3     ================    Senior addendum:  History and examination contributed to, and note reviewed and edited as appropriate by Juan Velazquez, Neurology resident, PGY-3

## 2024-08-12 ENCOUNTER — APPOINTMENT (OUTPATIENT)
Dept: PHYSICAL THERAPY | Facility: CLINIC | Age: 34
End: 2024-08-12
Payer: COMMERCIAL

## 2024-08-13 ENCOUNTER — EVALUATION (OUTPATIENT)
Dept: PHYSICAL THERAPY | Facility: CLINIC | Age: 34
End: 2024-08-13
Payer: COMMERCIAL

## 2024-08-13 DIAGNOSIS — M62.81 MUSCLE WEAKNESS: ICD-10-CM

## 2024-08-13 DIAGNOSIS — M62.89 MUSCLE TIGHTNESS: ICD-10-CM

## 2024-08-13 PROCEDURE — 97535 SELF CARE MNGMENT TRAINING: CPT | Mod: GP | Performed by: PHYSICAL THERAPIST

## 2024-08-13 PROCEDURE — 97162 PT EVAL MOD COMPLEX 30 MIN: CPT | Mod: GP | Performed by: PHYSICAL THERAPIST

## 2024-08-13 NOTE — PROGRESS NOTES
Physical Therapy  Physical Therapy Pelvic Floor Evaluation    Name: Rex Murphy  MRN: [id number]  : 1990  Today's Date: 24     Time Calculation  Start Time: 730  Stop Time: 840  Time Calculation (min): 70 min    Insurance:  Employee Medical, AUTH NEEDED, PT+OT+SLP  Visit # 1 of 30 for   Auth: 1 of ??? From ??? To ???    Current Problem:  1. Type 3b perineal laceration (HHS-HCC)  Referral to Physical Therapy    Follow Up In Physical Therapy      2. Muscle tightness  Follow Up In Physical Therapy      3. Muscle weakness  Follow Up In Physical Therapy          General  Reason for Referral: 3b perineal tear  Referred By: Tahmina Mahoney Fall Risk Score (The score of 4 or more indicates an increased risk of falling): 0  Vital Signs     Pain       Subjective  Chief Complaint: 24: vaginal delivery, 3b perineal laceration  - pregnancy went well  - medicated, induced at 39w, long labor, supine, pushing for 4+ hours  - some mild suture pain at times  - mild lower back pain throughout the day  - postpartum seizure - multiple ER visits  Onset: 24  KIMBERLEY: labor and delivery    Current Condition:  better    PAIN  Intensity (0-10): 0  Location:   Description:     Aggravating Factors: prolonged position  Relieving Factors: none    Relevant Information (PMH & Previous Tests/Imaging): see chart  Previous Interventions/Treatments: topical cream    Prior Level of Function (PLOF)  Exercise/Physical Activity: NA  Work/School: Registered Nurse, 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 Yes, pushing 4+ hours with 3b perineal tearing  Prolapse? no    Painful [place] or vaginal penetration? Yes, initially  and then not painful    BLADDER - feeling more urgency  Frequency of Urination  Daytime:  every 4 hours  Nighttime: 0-3 - when pumping  Screening = Blood in urine? no Painful urination? no Recurrent infections/UTIs? no  Other Symptoms: no incontinence  Urinary Incontinence/Leakage - no  Present with cough and/or sneeze? No  Present with physical activity and/or exertion? No  Average Fluid Intake (glasses/day): water, body armor/gatorade    BOWEL - feeling more urgency  Frequency of Bowel Movements: regular  Management Techniques: no, loose stool a couple of days ago, antibiotics/loose stools  Bowel Incontinence/Leakage? No  Average Fiber Intake (per day): yes, veggies and whole grains    Objective  Patient was educated on pelvic floor anatomy, function, and dysfunction.   Patient was educated on an orthopedic assessment & external pelvic floor assessment technique and verbalized consent.   The patient is aware they have full autonomy and can stop the assessment at any time.     Standing Assessment:   Posture: forward head, rounded shoulders, decreased lumbar lordosis  SL Stance: hip drop  bilaterally  DL Squat: dkv and ppt and att  Standing Lumbar Mobility: mild limitations all directions    Supine Movement Assessment   SLR: doming and pelvic rocking  Bridge: doming and lumbar extension  Hip PROM: mild limitation ER/IR  MMT: sup hip: 4-/5    Supine Mobility Assessment  - Hamstrings: moderate tightness  - Piriformis: moderate tightness    Diaphragmatic Breathing: abdominal - poor, difficult diaphragmatic breathing  Assess Abdomen  Obliques: mild tightness  Iliopsoas: mild tightness    OUTCOMES MEASURE:  Francia Pelvic Dysfunction Screening Tool : negative, 1    NIH-CPSI  -Pain: 4  - Urinary Symp: 0  - QOL: 1    Goals:   Active       PT Problem       PT Goal 1       Start:  08/13/24    Expected End:  11/11/24       Patient will return to prior level of function with minimal to no pain and without limitations for  participation in ADLs, health, and exercise.   Patient demonstrate home exercise program adherence to supplement symptom reduction and functional gains made in clinic  Patient will reports minimal to no pain for participation in ADLs and return to PLOF.   Patient will demonstrate coordination of pelvic floor, strength & relaxation wnl for return to ADLs and PLOF without restriction.   Pt will demonstrate improvement on the outcome measure score to demonstrate overall improved functional abilities and quality of life.              Education: home exercise program, plan of care, activity modifications, pain management, and injury pathology  Bladder Habits: Just in Case Pee, Hover over the toilet, relax & breathe, squatty potty use  Hydration Recommendation: 50% of your body wt (pounds) in fluid ounces  Bladder Irritants: caffeine, artificial sweeteners, carbonated beverages, alcohol  Fiber Intake Recommendation: 25-30g/day    Treatments: education  - diaphragmatic breathing  - happy baby  - cat/cow  - child's pose    Plan for Next Visit:   - deep core/pelvic floor/hip strengthening  - spinal mobility, hip/glute stretching  - breath-work  - internal pelvic floor assessment and hep    Assessment: Patient presents with signs and symptoms consistent with 3rd degree perineal laceration during labor and delivery, resulting in limited participation in pain-free ADLs and inability to perform at their prior level of function. Pt would benefit from physical therapy to address the impairments found & listed previously in the objective section in order to return to safe and pain-free ADLs and prior level of function.    Plan:   Planned Interventions include: therapeutic exercise, self-care home management, manual therapy, therapeutic activities, gait training, neuromuscular coordination, aquatic therapy  Patient and/or family understands and agrees with goals and plan documented: yes  Frequency: 2x/month  Duration: 2-4 months      Silvia Stanford, PT

## 2024-08-15 ENCOUNTER — APPOINTMENT (OUTPATIENT)
Dept: OBSTETRICS AND GYNECOLOGY | Facility: CLINIC | Age: 34
End: 2024-08-15
Payer: COMMERCIAL

## 2024-08-15 VITALS
DIASTOLIC BLOOD PRESSURE: 78 MMHG | WEIGHT: 137.56 LBS | BODY MASS INDEX: 25.99 KG/M2 | HEART RATE: 90 BPM | SYSTOLIC BLOOD PRESSURE: 121 MMHG

## 2024-08-15 PROCEDURE — 99214 OFFICE O/P EST MOD 30 MIN: CPT | Performed by: NURSE PRACTITIONER

## 2024-08-15 PROCEDURE — 0503F POSTPARTUM CARE VISIT: CPT | Performed by: NURSE PRACTITIONER

## 2024-08-15 ASSESSMENT — EDINBURGH POSTNATAL DEPRESSION SCALE (EPDS)
THE THOUGHT OF HARMING MYSELF HAS OCCURRED TO ME: NEVER
I HAVE FELT SCARED OR PANICKY FOR NO GOOD REASON: NO, NOT MUCH
I HAVE FELT SAD OR MISERABLE: NO, NOT AT ALL
I HAVE BEEN SO UNHAPPY THAT I HAVE BEEN CRYING: NO, NEVER
I HAVE BEEN ANXIOUS OR WORRIED FOR NO GOOD REASON: HARDLY EVER
I HAVE BEEN ABLE TO LAUGH AND SEE THE FUNNY SIDE OF THINGS: AS MUCH AS I ALWAYS COULD
THINGS HAVE BEEN GETTING ON TOP OF ME: NO, I HAVE BEEN COPING AS WELL AS EVER
I HAVE BLAMED MYSELF UNNECESSARILY WHEN THINGS WENT WRONG: NOT VERY OFTEN
I HAVE LOOKED FORWARD WITH ENJOYMENT TO THINGS: AS MUCH AS I EVER DID
TOTAL SCORE: 3
I HAVE BEEN SO UNHAPPY THAT I HAVE HAD DIFFICULTY SLEEPING: NOT AT ALL

## 2024-08-15 ASSESSMENT — PAIN SCALES - GENERAL: PAINLEVEL: 0-NO PAIN

## 2024-08-15 NOTE — PROGRESS NOTES
"Plan    Advised to call office for breast complaints, abnormal bleeding, mood changes, or other concerning symptoms.   FMLA leave to be extended 4 additional weeks d/t ongoing postpartum complications  Patient with neuro/epilepsy referral placed from ED, has appointment scheduled for   Established in [facility] clinic, next follow up scheduled for     Diagnoses and all orders for this visit:  Postpartum exam ([facility])  Eclampsia in the puerperium, postpartum condition or complication ([facility])    Follow up in about 4 months (around 12/15/2024) for Annual exam.    DELICIA Alcala, APRN-CNP    Subjective   34 y.o.  presenting for postpartum follow-up     Delivery Date: 24  Gestational Age: 39.1  Type of Delivery: Vaginal, Spontaneous, c/b third degree lac, readmission following eclamptic seizure on -, readmission  for severe range BP and severe edema - discharged on nifed 30mg; mastitis dx on     Pregnancy Problems (from 23 to 08/15/24)       Problem Noted Resolved    Eclampsia ([facility]) 7/10/2024 by Yoon Young MD No    Priority:  Medium      History of gestational diabetes mellitus (GDM) 2024 by DELICIA Gooden No    Family history of neural tube defect 2023 by DELICIA Gooden No    Anemia affecting pregnancy in third trimester ([facility]) 2024 by DELICIA Gooden No    Uterine size-date discrepancy in third trimester ([facility]) 2024 by DELICIA Gooden 2024 by DELICIA Collazo, APRN-CNP     Concerns: reports episode of \"twitching/rhythmic movements of R hand\" this past weekend, presented to ED and was evaluated and cleared by neuro with recommendation for outpatient follow up. Continues to be taking nifedipine 30mg daily, afraid of discontinuing    Pain: controlled  Lacerations: 3rd degree  Lochia: resolved  Sexual Intimacy: No  Contraceptive Method: None  Feeding Method: " She is breast feeding with some supplementation. Reports mastitis symptoms resolved; reports occasionally feeling clogged duct in R breast, no concerns today.   Lactation Consult Needed?: No    Birth Trauma: Yes, describe: patient endorses significant trauma related to postpartum course - verbalizes feeling like her concerns were ignored on multiple occasions, has ongoing anxiety related to fear of having another seizure, afraid to hold baby or drive    Bonding with Baby: well with baby boy, Nihaal   Mood: Stable    Postpartum Depression: Low Risk  (8/15/2024)    [facility] [place]  Depression Scale     Last EPDS Total Score: 3     Last EPDS Self Harm Result: Never     Last pap: 2023 NILM, HPV (-)    Objective    /78   Pulse 90   Wt 62.4 kg (137 lb 9 oz)   LMP 10/05/2023 (Exact Date) Comment: Breastmilk and formula  Breastfeeding Yes   BMI 25.99 kg/m²    Physical Exam  Constitutional:       Appearance: Normal appearance.   Genitourinary:   Breasts:     Breasts are symmetrical.      Breasts are soft.     Right: No swelling, mass, nipple discharge, skin change or tenderness.      Left: No swelling, mass, nipple discharge, skin change or tenderness.   Cardiovascular:      Rate and Rhythm: Normal rate and regular rhythm.   Pulmonary:      Effort: Pulmonary effort is normal.      Breath sounds: Normal breath sounds.   Abdominal:      Palpations: Abdomen is soft.      Tenderness: There is no abdominal tenderness.   Neurological:      General: No focal deficit present.      Mental Status: She is alert and oriented to person, place, and time. Mental status is at baseline.   Skin:     General: Skin is warm and dry.   Psychiatric:         Mood and Affect: Mood normal.         Behavior: Behavior normal.         Thought Content: Thought content normal.         Judgment: Judgment normal.

## 2024-08-21 ENCOUNTER — OFFICE VISIT (OUTPATIENT)
Dept: NEUROLOGY | Facility: CLINIC | Age: 34
End: 2024-08-21
Payer: COMMERCIAL

## 2024-08-21 VITALS
HEART RATE: 87 BPM | BODY MASS INDEX: 25.51 KG/M2 | SYSTOLIC BLOOD PRESSURE: 101 MMHG | WEIGHT: 135 LBS | DIASTOLIC BLOOD PRESSURE: 68 MMHG | RESPIRATION RATE: 18 BRPM

## 2024-08-21 DIAGNOSIS — R56.9 SEIZURE-LIKE ACTIVITY (MULTI): Primary | ICD-10-CM

## 2024-08-21 PROCEDURE — 1036F TOBACCO NON-USER: CPT | Performed by: STUDENT IN AN ORGANIZED HEALTH CARE EDUCATION/TRAINING PROGRAM

## 2024-08-21 PROCEDURE — 99204 OFFICE O/P NEW MOD 45 MIN: CPT | Performed by: STUDENT IN AN ORGANIZED HEALTH CARE EDUCATION/TRAINING PROGRAM

## 2024-08-21 PROCEDURE — 99214 OFFICE O/P EST MOD 30 MIN: CPT | Performed by: STUDENT IN AN ORGANIZED HEALTH CARE EDUCATION/TRAINING PROGRAM

## 2024-08-21 ASSESSMENT — ENCOUNTER SYMPTOMS
SLEEP DISTURBANCE: 1
SEIZURES: 1

## 2024-08-21 ASSESSMENT — PAIN SCALES - GENERAL: PAINLEVEL: 0-NO PAIN

## 2024-08-21 NOTE — PROGRESS NOTES
Adult Epilepsy Clinic History and Physical    History Of Present Illness  Rex Murphy is a 34 y.o. female presenting after recent hospitalization for concerns of seizures. She was [place] [facility]24 and second on 8/10/24.     Anamnesis (per patient): During the first period, it was 5 days after the delivery of her . She noticed at home that her blood pressure was high (SBP >160) and she started having jerking of her right arm, face, and leg causing her to drop her baby bottle or her phone. She could feel her face being pulled to he right and her leg kicking up. She also reported an unusual sensation in her face such as that her face was burning or melting. She didn't lose conciousness for most of this but during one bad one she took some time to recover. She notes right lateral tongue bite with this. On admission, her SBP was 130-140. She received medicine to stop the seizure and then received magnesium. MRI and EEG were done which were normal, although no events were captured. When it happened the next month, her blood pressure was normal and it was mostly isolated jerking of the fingers, hand, or arm on the right. She would also get an unusual sensation of fear that the jerking was happening again but she's not sure if the fear sensation is as a result of the jerking or preceded the jerking.     At her 8/10/24 visit, epilepsy thought her jerking was not related to epilepsy so she was discharged without further work-up or treatment but recommended seizure precautions. That night she continued to have the jerking but was able to fall asleep. When she woke up the next morning, the jerking had stopped and hasn't had any more jerking since then.    Anamnesis (per witness):  is present at today's visit who assists in the description. He recalls a similar description to the patient.     Epilepsy Co-morbidites:  Reports significant mental and phyiscal stress  during her recent delivery due to prolonged delivery requiring epidural and induction  Sleep disorders: Decreased sleep currently in her postpartum period    4D-Classification:  Event type: paroxysmal event  Seizure Semiology: Right face somatosensory aura -> Right face, arm, and leg motor event  Frequency: 2 days of events (8/10/24 and 7/10/24)  Etiology: Unknown  Co-morbidities: Gestational diabetes, hypertension    Onset of events: 7/10/24  Current ASMs: None  Previous ASMs: None      Social History:   Occupation:  NICU Nurse  Driving: Denies    Past Medical History  Past Medical History:   Diagnosis Date    Syncope and collapse 09/02/2021    Near syncope     Surgical History  Past Surgical History:   Procedure Laterality Date    OTHER SURGICAL HISTORY  09/02/2021    No history of surgery     Social History  Social History     Tobacco Use    Smoking status: Never     Passive exposure: Never    Smokeless tobacco: Never   Vaping Use    Vaping status: Never Used   Substance Use Topics    Alcohol use: Never    Drug use: Never     Allergies  Amoxicillin and Cefazolin in dextrose (iso-os)  (Not in a hospital admission)    Medications  Current Outpatient Medications   Medication Instructions    NIFEdipine ER (ADALAT CC) 30 mg, oral, Every 24 hours, Do not crush, chew, or split.           Review of Systems   Eyes:  Positive for visual disturbance.   Neurological:  Positive for seizures.   Psychiatric/Behavioral:  Positive for sleep disturbance.    All other systems reviewed and are negative.    GENERAL APPEARANCE:  No distress, alert and cooperative.   MENTAL STATE:  Orientation was normal to time, place and person. Recent and remote memory was intact.  Attention span and concentration were normal. Language testing was normal for comprehension, repetition, expression, and naming. General fund of knowledge was intact.   CRANIAL NERVES:     CN 2- Visual Acuity was grossly normal.      CN 3, 4, 6-  Pupils round, 4 mm in  diameter. No ptosis. EOMs normal alignment, full range of movement, no nystagmus     CN 7- Normal and symmetric facial strength. Nasolabial folds symmetric.     CN 8- Hearing intact     CN 12- Tongue midline, with normal bulk and strength; no fasciculations.   MOTOR:  Motor exam was normal. Muscle bulk and tone were normal in both upper and lower extremities. No fasciculations, tremor or other abnormal movements were present.   COORDINATION: Finger tapping and open-close hand maneuvers were smooth and without interruptions.  Finger-nose-finger was intact without dysmetria or overshoot.    GAIT: Station was stable with a normal base and negative Romberg sign. Gait was stable with a normal arm swing and speed. No ataxia, shuffling, steppage or waddling was noted. Tandem gait intact  Last Recorded Vitals  Blood pressure 101/68, pulse 87, resp. rate 18, weight 61.2 kg (135 lb), last menstrual period 10/05/2023, currently breastfeeding.    Relevant Results/Neurodiagnostics  MR brain wo IV contrast    Result Date: 7/11/2024  Interpreted By:  Dorian Martinez,  and Jw Muñoz STUDY: MR BRAIN WO IV CONTRAST; MR VENOGRAPHY INTRACRANIAL WO IV CONTRAST; MR ANGIO HEAD WO IV CONTRAST;  7/10/2024 10:04 pm   INDICATION: Signs/Symptoms:c/f eclamptic seizure, headache.   COMPARISON: 07/25/2020   ACCESSION NUMBER(S): [id number]; [id number]; [id number]   ORDERING CLINICIAN: RAO HALE   TECHNIQUE: The brain was studied in the sagittal, axial and coronal planes utilizing FLAIR, T1 and T2 weighted images.   Time-of-flight MRA and MRV of the head was performed. The images were reviewed as source images and maximum intensity projections.   FINDINGS: MRI BRAIN: Parenchyma: There is no diffusion restriction abnormality to suggest acute infarct. There is no focal parenchymal signal abnormality. There is no mass effect or midline shift. Gradient echo T2 weighted images fail to demonstrate hemosiderin deposition or other evidence  of hemorrhage. . Left frontal punctate cortical calcification again seen   CSF Spaces: The ventricles, sulci and basal cisterns are within normal limits.   Paranasal Sinuses and Mastoids: Visualized paranasal sinuses and mastoid air cells are unremarkable.   Orbits: Unremarkable appearance of the bilateral orbits.     MRA BRAIN: Anterior circulation: Mild irregularity along the proximal right anterior communicating artery with felt to be tortuous vessel. There is otherwise expected flow signal in bilateral intracranial internal carotid arteries, bilateral carotid terminals, bilateral proximal anterior and middle cerebral arteries. Posterior circulation: Bilateral intracranial vertebral arteries, vertebrobasilar junction, basilar artery and proximal posterior cerebral arteries demonstrate expected flow signal.   MRV BRAIN: Superior sagittal sinus: Normal. Torcula: Normal. Straight sinus:Normal. Internal cerebral veins/vein of Adriano: Normal. Transverse sinuses: Normal. Sigmoid sinuses: Normal. Proximal jugular veins: Normal.       1. There is no evidence of mass, infarction or hemorrhage. 2. Unremarkable MR a without evidence of stenosis or aneurysm 3. Unremarkable MRV of the brain.   I personally reviewed the images/study and I agree with the findings as stated. This study was interpreted at [place], [place].   MACRO: None.   Signed by: Dorian Martinez 7/11/2024 3:25 AM Dictation workstation:   [id number]      Routine EEG (7/11/2024): This routine EEG is normal. No epileptiform discharges or lateralizing signs were seen.    I have personally reviewed the above imaging and EEG results.     Assessment/Plan  4D-Classification:  Event type: Paroxysmal event  1. Seizure Semiology: Right face somatosensory aura -> Right face, arm, and leg motor event  Frequency: 2 days of events (8/10/24 and 7/10/24)  3. Etiology: Unknown  4. Co-morbidities: Gestational diabetes,  hypertension    Onset of events: 7/10/24  Current ASMs: None  Previous ASMs: None    Rex is a 34 y.o. woman who presents after two events of right hemibody motor events concerning for epileptic seizure. The description of the first event seems more likely consistent with an epileptic seizure while the second may also be but without ongoing activity or video to review, this remains uncertain. She was evaluated by  neurology and epilepsy teams who were both not convinced that the events were epileptic in nature although events were never captured on vEEG and sEMG studies were not completed to further elucidate the type of motor activity. I discussed with the patient both possibilities that they could be epileptic in setting of post-partum eclampsia or that they are non-epileptic spells. Nonetheless, they are likely related to the immediate post-partum period with a small risk for long term recurrence.     Plan:  - Continue seizure precautions and restrictions during maternity leave (planned to continue through October).  - I requested the patient to check in near the end of her maternity leave to let us know how she is doing and if there are any concerns for recurrence  - Record with phone video any future events to review and help establish a diagnosis  - Go to the ED if any future events with loss of consciousness or concerns for injury  - Follow-up as needed      Isaak Burgess MD  Epilepsy [facility], [facility]

## 2024-08-22 ENCOUNTER — LAB (OUTPATIENT)
Dept: LAB | Facility: LAB | Age: 34
End: 2024-08-22
Payer: COMMERCIAL

## 2024-08-22 ENCOUNTER — PATIENT OUTREACH (OUTPATIENT)
Dept: CARE COORDINATION | Facility: CLINIC | Age: 34
End: 2024-08-22

## 2024-08-22 DIAGNOSIS — O24.410 DIET CONTROLLED GESTATIONAL DIABETES MELLITUS (GDM) IN THIRD TRIMESTER (HHS-HCC): ICD-10-CM

## 2024-08-22 LAB
GLUCOSE 2H P 75 G GLC PO SERPL-MCNC: 82 MG/DL
GLUCOSE P FAST SERPL-MCNC: 79 MG/DL

## 2024-08-22 PROCEDURE — 82947 ASSAY GLUCOSE BLOOD QUANT: CPT

## 2024-08-22 PROCEDURE — 82950 GLUCOSE TEST: CPT

## 2024-08-22 PROCEDURE — 36415 COLL VENOUS BLD VENIPUNCTURE: CPT

## 2024-08-22 NOTE — PROGRESS NOTES
Outreach call to patient to support a smooth transition of care from recent admission.  Left voicemail message for patient with my contact information.    Yadira Alvares RN BSN  [facility] Care Manager  [phone]

## 2024-08-26 ENCOUNTER — TREATMENT (OUTPATIENT)
Dept: PHYSICAL THERAPY | Facility: CLINIC | Age: 34
End: 2024-08-26
Payer: COMMERCIAL

## 2024-08-26 DIAGNOSIS — M62.89 MUSCLE TIGHTNESS: ICD-10-CM

## 2024-08-26 DIAGNOSIS — M62.81 MUSCLE WEAKNESS: ICD-10-CM

## 2024-08-26 PROCEDURE — 97535 SELF CARE MNGMENT TRAINING: CPT | Mod: GP | Performed by: PHYSICAL THERAPIST

## 2024-08-26 PROCEDURE — 97140 MANUAL THERAPY 1/> REGIONS: CPT | Mod: GP | Performed by: PHYSICAL THERAPIST

## 2024-08-26 NOTE — PROGRESS NOTES
Physical Therapy  Physical Therapy Pelvic Floor Evaluation    Name: Rex Murphy  MRN: [id number]  : 1990  Today's Date: 24     Time Calculation  Start Time: 930  Stop Time: 0  Time Calculation (min): 60 min    Insurance:  Employee Medical, AUTH NEEDED, PT+OT+SLP  Visit # 2 of 30 for   Auth: 2 of 8 From 24 To 24    Current Problem:  1. Type 3b perineal laceration (HHS-HCC)  Follow Up In Physical Therapy      2. Muscle tightness  Follow Up In Physical Therapy      3. Muscle weakness  Follow Up In Physical Therapy          General     Precautions     Vital Signs     Pain       Subjective  Chief Complaint: 24: vaginal delivery, 3b perineal laceration  - pregnancy went well  - medicated, induced at 39w, long labor, supine, pushing for 4+ hours  - some mild suture pain at times  - mild lower back pain throughout the day  - postpartum seizure - multiple ER visits  - *discusses having symptoms of vaginismus and use of dilator training prior to pregnancy*  Onset: 24  KIMBERLEY: labor and delivery    Today:  - no more neurological symptoms since last visit, saw neurologist  - continued increased fatigue, pumping  - lower back soreness, changing and leaning over  - pain with sitting on the toilet around the vulva    PAIN  Intensity (0-10): 4  Location:  vulva with sitting on the toilet  Description: intermittent, throbbing    Prior Level of Function (PLOF)  Exercise/Physical Activity: NA  Work/School: Registered Nurse, 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 Yes, pushing 4+ hours with 3b perineal tearing  Painful [place] or vaginal penetration? Yes, initially and then not painful    BLADDER - feeling more urgency (no increased frequency)  BOWEL - feeling more urgency    Objective: abnormal posture, total  lumbo-pelvic/core/hip weakness - PF weakness  Posture: forward head, rounded shoulders, decreased lumbar lordosis  SL Stance: hip drop  bilaterally  DL Squat: dkv and ppt and att  Standing Lumbar Mobility: mild limitations all directions  SLR: doming and pelvic rocking  Bridge: doming and lumbar extension  Hip PROM: mild limitation ER/IR  MMT: sup hip: 4-/5  - Hamstrings: moderate tightness  - Piriformis: moderate tightness  Diaphragmatic Breathing: abdominal - poor, difficult diaphragmatic breathing  Assess Abdomen  Obliques: mild tightness  Iliopsoas: mild tightness    Patient was educated on pelvic floor anatomy, function, and dysfunction.   Patient was educated on an intra-vaginal pelvic floor assessment technique and verbalized consent.   The patient is aware they have full autonomy and can stop the assessment at any time.     Perineal Observation - At Rest: mild scar tissue present at 5-6 o'clock  Contraction: visible  Relaxation: visible    Bony Palpation: no tenderness to palpation  External Soft Tissue Palpation/Pelvic Clock: tightness/tenderness of adductors & glutes    Internal Intra-vaginal Exam  - Layer 1/2/3: layer 1: painful, sensitivity, tightness, spasm response to initial touch   - further assessment stopped due to pain, tightness, and discomfort   - demonstrated external soft tissue stretching around introitus for desensitization and scar tissue mobilizatio    Treatments:   - Continue: diaphragmatic breathing, happy baby, cat/cow, child's pose  - Discussed and recommend self/partner PF: STM  - Discussed and provided dilator program, will not begin static dilation yet, only external and layer 1 partner STM    Plan for Next Visit: assess response to program  - deep core/pelvic floor/hip strengthening  - spinal mobility, hip/glute stretching  - breath-work  - internal pelvic floor assessment and hep    Assessment: Patient presents with signs and symptoms consistent with 3rd degree perineal laceration  during labor and delivery, resulting in limited participation in pain-free ADLs and inability to perform at their prior level of function. Pt would benefit from physical therapy to address the impairments found & listed previously in the objective section in order to return to safe and pain-free ADLs and prior level of function.  - experienced spasm and pain with intravaginal PF assessment, limited assessment revealed sensitivity and global layer 1 PF tightness - no lingering/refractor symptoms following assessment    Plan:   Planned Interventions include: therapeutic exercise, self-care home management, manual therapy, therapeutic activities, gait training, neuromuscular coordination, aquatic therapy  Patient and/or family understands and agrees with goals and plan documented: yes  Frequency: 2x/month  Duration: 2-4 months     Silvia Stanford, PT

## 2024-08-30 ENCOUNTER — OFFICE VISIT (OUTPATIENT)
Dept: OBSTETRICS AND GYNECOLOGY | Facility: CLINIC | Age: 34
End: 2024-08-30
Payer: COMMERCIAL

## 2024-08-30 DIAGNOSIS — O15.9: ICD-10-CM

## 2024-08-30 DIAGNOSIS — F41.9 ANXIETY: ICD-10-CM

## 2024-08-30 PROCEDURE — 99214 OFFICE O/P EST MOD 30 MIN: CPT | Performed by: OBSTETRICS & GYNECOLOGY

## 2024-09-06 ENCOUNTER — APPOINTMENT (OUTPATIENT)
Dept: PHYSICAL THERAPY | Facility: CLINIC | Age: 34
End: 2024-09-06
Payer: COMMERCIAL

## 2024-09-12 ENCOUNTER — TREATMENT (OUTPATIENT)
Dept: PHYSICAL THERAPY | Facility: CLINIC | Age: 34
End: 2024-09-12
Payer: COMMERCIAL

## 2024-09-12 DIAGNOSIS — M62.81 MUSCLE WEAKNESS: ICD-10-CM

## 2024-09-12 DIAGNOSIS — M62.89 MUSCLE TIGHTNESS: ICD-10-CM

## 2024-09-12 PROCEDURE — 97535 SELF CARE MNGMENT TRAINING: CPT | Mod: GP | Performed by: PHYSICAL THERAPIST

## 2024-09-12 PROCEDURE — 97110 THERAPEUTIC EXERCISES: CPT | Mod: GP | Performed by: PHYSICAL THERAPIST

## 2024-09-12 NOTE — PROGRESS NOTES
Physical Therapy  Physical Therapy Pelvic Floor Evaluation    Name: Rex Murphy  MRN: [id number]  : 1990  Today's Date: 24          Insurance:  Employee Medical, AUTH NEEDED, PT+OT+SLP  Visit # 3 of 30 for   Auth: 3 of 8 From 24 To 24    Current Problem:  1. Type 3b perineal laceration (HHS-HCC)  Follow Up In Physical Therapy      2. Muscle tightness  Follow Up In Physical Therapy      3. Muscle weakness  Follow Up In Physical Therapy          General     Precautions     Vital Signs     Pain       Subjective  Chief Complaint: 24: vaginal delivery, 3b perineal laceration  - pregnancy went well  - medicated, induced at 39w, long labor, supine, pushing for 4+ hours  - some mild suture pain at times  - mild lower back pain throughout the day  - postpartum seizure - multiple ER visits  - *discusses having symptoms of vaginismus and use of dilator training prior to pregnancy*  Onset: 24  KIMBERLEY: labor and delivery    Today:  - some stretching with partner, but still recovering and prioritizing stress  - vulva pain, short and intermittent episodes of pain with movement (running up stairs)  - lower back pain stuck or tight when transitioning from sitting to standing    PAIN  Intensity (0-10): 4  Location:  vulva with sitting on the toilet  Description: intermittent, throbbing    Prior Level of Function (PLOF)  Exercise/Physical Activity: NA  Work/School: Registered Nurse, 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 Yes, pushing 4+ hours with 3b perineal tearing  Painful [place] or vaginal penetration? Yes, initially and then not painful    BLADDER - feeling more urgency (no increased frequency)  BOWEL - feeling more urgency    Objective: abnormal posture, total lumbo-pelvic/core/hip weakness - PF weakness  Posture:  forward head, rounded shoulders, decreased lumbar lordosis  SL Stance: hip drop  bilaterally  DL Squat: dkv and ppt and att  Standing Lumbar Mobility: mild limitations all directions  SLR: doming and pelvic rocking  Bridge: doming and lumbar extension  Hip PROM: mild limitation ER/IR  MMT: sup hip: 4-/5  - Hamstrings: moderate tightness  - Piriformis: moderate tightness  Diaphragmatic Breathing: abdominal - poor, difficult diaphragmatic breathing  Assess Abdomen  Obliques: mild tightness  Iliopsoas: mild tightness    Patient was educated on pelvic floor anatomy, function, and dysfunction.   Patient was educated on an intra-vaginal pelvic floor assessment technique and verbalized consent.   The patient is aware they have full autonomy and can stop the assessment at any time.     Perineal Observation - At Rest: mild scar tissue present at 5-6 o'clock  Contraction: visible  Relaxation: visible    Bony Palpation: no tenderness to palpation  External Soft Tissue Palpation/Pelvic Clock: tightness/tenderness of adductors & glutes    Internal Intra-vaginal Exam  - Layer 1/2/3: layer 1: painful, sensitivity, tightness, spasm response to initial touch   - further assessment stopped due to pain, tightness, and discomfort   - demonstrated external soft tissue stretching around introitus for desensitization and scar tissue mobilizatio    Treatments:   - Continue: diaphragmatic breathing, happy baby, cat/cow, child's pose  - Discussed and recommend self/partner PF: STM  - Discussed and provided dilator program, will not begin static dilation yet, only external and layer 1 partner STM    Access Code: [email or web address]  - provided and performed  - Supine Lower Trunk Rotation  - 3-5 x weekly - 10-30 reps  - Supine Pelvic Floor Stretch  - 3-5 x weekly - 3 sets - 5 breaths hold  - Supine Figure 4 Piriformis Stretch  - 3-5 x weekly - 3 sets - 20 seconds hold  - Sidelying Lumbar Rotation Stretch  - 3-5 x weekly - 3 sets - 20 seconds hold  - Cat  Cow  - 3-5 x weekly - 10 reps  - Child's Pose Stretch  - 3-5 x weekly - 1-3 minutes hold  - Lying Prone  - 3-5 x weekly - 5-10 minutes hold  - Seated Cervical Flexion Stretch with Finger Support Behind Neck  - 1 x daily - 3 sets - 15 seconds hold  - Seated Upper Trapezius Stretch  - 1 x daily - 3 sets - 15 seconds hold    Plan for Next Visit: assess response to program  - deep core/pelvic floor/hip strengthening  - spinal mobility, hip/glute stretching  - breath-work  - internal pelvic floor assessment and hep    Assessment: Patient presents with signs and symptoms consistent with 3rd degree perineal laceration during labor and delivery, resulting in limited participation in pain-free ADLs and inability to perform at their prior level of function. Pt would benefit from physical therapy to address the impairments found & listed previously in the objective section in order to return to safe and pain-free ADLs and prior level of function.  - emphasis on continued slow and gentle stretching of perineum and pelvic floor  - tolerated stretching and mobility exercises well without increased pain or symptoms  - vulvar symptoms consistent with vaginismus, possible mild pudendal neuralgia, tight pelvic floor, core weakness, compensatory post-partum posture changes     Plan:   Planned Interventions include: therapeutic exercise, self-care home management, manual therapy, therapeutic activities, gait training, neuromuscular coordination, aquatic therapy  Patient and/or family understands and agrees with goals and plan documented: yes  Frequency: 2x/month  Duration: 2-4 months     Silvia Stanford, PT

## 2024-09-13 ENCOUNTER — PATIENT OUTREACH (OUTPATIENT)
Dept: CARE COORDINATION | Facility: CLINIC | Age: 34
End: 2024-09-13
Payer: COMMERCIAL

## 2024-09-13 NOTE — PROGRESS NOTES
Outreach call to patient to support a smooth transition of care from recent admission.  Left voicemail message for patient with my contact information.    Yadira Alvares RN BSN  [facility] Care Manager  [phone]

## 2024-09-18 ENCOUNTER — PATIENT OUTREACH (OUTPATIENT)
Dept: CARE COORDINATION | Facility: CLINIC | Age: 34
End: 2024-09-18
Payer: COMMERCIAL

## 2024-09-18 NOTE — PROGRESS NOTES
Outreach call to patient to check in after hospital discharge to support smooth transition of care. Patient reports that her blood pressures are now under control and within normal limits. Patient has not needed blood pressure medication in over a week. She is active with pelvic floor therapy and has found it to be beneficial. She saw neurology, and if she has another seizure episode to record it, unsure etiology. Patient with no additional needs noted. No additional outreach needed at this time.      Yadira Alvares RN BSN  O Care Manager  [phone]

## 2024-09-20 ENCOUNTER — TREATMENT (OUTPATIENT)
Dept: PHYSICAL THERAPY | Facility: CLINIC | Age: 34
End: 2024-09-20
Payer: COMMERCIAL

## 2024-09-20 DIAGNOSIS — M62.89 MUSCLE TIGHTNESS: ICD-10-CM

## 2024-09-20 DIAGNOSIS — M62.81 MUSCLE WEAKNESS: ICD-10-CM

## 2024-09-20 PROCEDURE — 97535 SELF CARE MNGMENT TRAINING: CPT | Mod: GP | Performed by: PHYSICAL THERAPIST

## 2024-09-20 PROCEDURE — 97140 MANUAL THERAPY 1/> REGIONS: CPT | Mod: GP | Performed by: PHYSICAL THERAPIST

## 2024-09-20 NOTE — PROGRESS NOTES
Physical Therapy  Physical Therapy Pelvic Floor Evaluation    Name: Rex Murphy  MRN: [id number]  : 1990  Today's Date: 24     Time Calculation  Start Time: 1440  Stop Time: 1515  Time Calculation (min): 35 min    Insurance:  Employee Medical, AUTH NEEDED, PT+OT+SLP  Visit # 4 of 30 for   Auth: 4 of 8 From 24 To 24    Current Problem:  1. Type 3b perineal laceration (HHS-HCC)  Follow Up In Physical Therapy      2. Muscle tightness  Follow Up In Physical Therapy      3. Muscle weakness  Follow Up In Physical Therapy          General     Precautions     Vital Signs     Pain       Subjective  Chief Complaint: 24: vaginal delivery, 3b perineal laceration  - pregnancy went well  - medicated, induced at 39w, long labor, supine, pushing for 4+ hours  - some mild suture pain at times  - mild lower back pain throughout the day  - postpartum seizure - multiple ER visits  - *discusses having symptoms of vaginismus and use of dilator training prior to pregnancy*  Onset: 24  KIMBERLEY: labor and delivery    Today:  - some stretching with partner, but still recovering and prioritizing stress  - vulva pain, short and intermittent episodes of pain with movement (running up stairs)  - lower back pain stuck or tight when transitioning from sitting to standing    PAIN  Intensity (0-10): 4  Location:  vulva with sitting on the toilet  Description: intermittent, throbbing    Prior Level of Function (PLOF)  Exercise/Physical Activity: NA  Work/School: Registered Nurse, 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 Yes, pushing 4+ hours with 3b perineal tearing  Painful [place] or vaginal penetration? Yes, initially and then not painful    BLADDER - feeling more urgency (no increased frequency)  BOWEL - feeling more urgency    Objective:  abnormal posture, total lumbo-pelvic/core/hip weakness - PF weakness  Posture: forward head, rounded shoulders, decreased lumbar lordosis  SL Stance: hip drop  bilaterally  DL Squat: dkv and ppt and att  Standing Lumbar Mobility: mild limitations all directions  SLR: doming and pelvic rocking  Bridge: doming and lumbar extension  Hip PROM: mild limitation ER/IR  MMT: sup hip: 4-/5  - Hamstrings: moderate tightness  - Piriformis: moderate tightness  Diaphragmatic Breathing: abdominal - poor, difficult diaphragmatic breathing  Assess Abdomen  Obliques: mild tightness  Iliopsoas: mild tightness    Patient was educated on pelvic floor anatomy, function, and dysfunction.   Patient was educated on an intra-vaginal pelvic floor assessment technique and verbalized consent.   The patient is aware they have full autonomy and can stop the assessment at any time.     Perineal Observation - At Rest: mild scar tissue present at 5-6 o'clock  Contraction: visible  Relaxation: visible    Bony Palpation: no tenderness to palpation  External Soft Tissue Palpation/Pelvic Clock: tightness/tenderness of adductors & glutes    Internal Intra-vaginal Exam  - Layer 1/2/3: layer 1: painful, sensitivity, tightness, spasm response to initial touch   - further assessment stopped due to pain, tightness, and discomfort   - demonstrated external soft tissue stretching around introitus for desensitization and scar tissue mobilizatio  9/20/24: continued tenderness and pain with perineal/direct scar tissue mobility - improved tolerance and tissue mobility with stretching just lateral to scar tissue    Treatments:   - Continue: diaphragmatic breathing, happy baby, cat/cow, child's pose  - Discussed and recommend self/partner PF: STM  - Discussed and provided dilator program, will not begin static dilation yet, only external and layer 1 partner STM    Access Code: [email or web address]  - provided and performed  - Supine Lower Trunk Rotation  - 3-5 x weekly - 10-30  reps  - Supine Pelvic Floor Stretch  - 3-5 x weekly - 3 sets - 5 breaths hold  - Supine Figure 4 Piriformis Stretch  - 3-5 x weekly - 3 sets - 20 seconds hold  - Sidelying Lumbar Rotation Stretch  - 3-5 x weekly - 3 sets - 20 seconds hold  - Cat Cow  - 3-5 x weekly - 10 reps  - Child's Pose Stretch  - 3-5 x weekly - 1-3 minutes hold  - Lying Prone  - 3-5 x weekly - 5-10 minutes hold  - Seated Cervical Flexion Stretch with Finger Support Behind Neck  - 1 x daily - 3 sets - 15 seconds hold  - Seated Upper Trapezius Stretch  - 1 x daily - 3 sets - 15 seconds hold    Plan for Next Visit: assess response to program  - deep core/pelvic floor/hip strengthening  - spinal mobility, hip/glute stretching  - breath-work  - internal pelvic floor assessment and hep    Assessment: Patient presents with signs and symptoms consistent with 3rd degree perineal laceration during labor and delivery, resulting in limited participation in pain-free ADLs and inability to perform at their prior level of function. Pt would benefit from physical therapy to address the impairments found & listed previously in the objective section in order to return to safe and pain-free ADLs and prior level of function.  - emphasis on continued slow and gentle stretching of perineum and pelvic floor  - tolerated stretching and mobility exercises well without increased pain or symptoms  - vulvar symptoms consistent with vaginismus, possible mild pudendal neuralgia, tight pelvic floor, core weakness, compensatory post-partum posture changes     Plan:   Planned Interventions include: therapeutic exercise, self-care home management, manual therapy, therapeutic activities, gait training, neuromuscular coordination, aquatic therapy  Patient and/or family understands and agrees with goals and plan documented: yes  Frequency: 2x/month  Duration: 2-4 months     Silvia Stanford, PT

## 2024-10-11 ENCOUNTER — TELEMEDICINE CLINICAL SUPPORT (OUTPATIENT)
Dept: PHYSICAL THERAPY | Facility: CLINIC | Age: 34
End: 2024-10-11
Payer: COMMERCIAL

## 2024-10-11 DIAGNOSIS — M62.81 MUSCLE WEAKNESS: ICD-10-CM

## 2024-10-11 DIAGNOSIS — M62.89 MUSCLE TIGHTNESS: ICD-10-CM

## 2024-10-11 PROCEDURE — 97535 SELF CARE MNGMENT TRAINING: CPT | Mod: GP | Performed by: PHYSICAL THERAPIST

## 2024-10-11 NOTE — PROGRESS NOTES
Physical Therapy  Physical Therapy Pelvic Floor (virtual)    Name: Rex Murphy  MRN: [id number]  : 1990  Today's Date: 10/11/24          Insurance:  Employee Medical, AUTH NEEDED, PT+OT+SLP  Visit # 5 of 30 for   Auth: 5 of 8 From 24 To 24    Current Problem:  1. Type 3b perineal laceration (HHS-HCC)  Follow Up In Physical Therapy      2. Muscle tightness  Follow Up In Physical Therapy      3. Muscle weakness  Follow Up In Physical Therapy          General     Precautions     Vital Signs     Pain       Subjective  Chief Complaint: 24: vaginal delivery, 3b perineal laceration  - pregnancy went well  - medicated, induced at 39w, long labor, supine, pushing for 4+ hours  - some mild suture pain at times  - mild lower back pain throughout the day  - postpartum seizure - multiple ER visits  - *discusses having symptoms of vaginismus and use of dilator training prior to pregnancy*  Onset: 24  KIMBERLEY: labor and delivery    Today:  - some stretching with partner, but still recovering and prioritizing stress  - vulva pain, short and intermittent episodes of pain with movement (running up stairs)  - lower back pain stuck or tight when transitioning from sitting to standing    PAIN  Intensity (0-10): 4  Location:  vulva with sitting on the toilet  Description: intermittent, throbbing    Prior Level of Function (PLOF)  Exercise/Physical Activity: NA  Work/School: Registered Nurse, 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 Yes, pushing 4+ hours with 3b perineal tearing  Painful [place] or vaginal penetration? Yes, initially and then not painful    BLADDER - feeling more urgency (no increased frequency)  BOWEL - feeling more urgency    Objective: abnormal posture, total lumbo-pelvic/core/hip weakness - PF weakness  Posture: forward  head, rounded shoulders, decreased lumbar lordosis  SL Stance: hip drop  bilaterally  DL Squat: dkv and ppt and att  Standing Lumbar Mobility: mild limitations all directions  SLR: doming and pelvic rocking  Bridge: doming and lumbar extension  Hip PROM: mild limitation ER/IR  MMT: sup hip: 4-/5  - Hamstrings: moderate tightness  - Piriformis: moderate tightness  Diaphragmatic Breathing: abdominal - poor, difficult diaphragmatic breathing  Assess Abdomen  Obliques: mild tightness  Iliopsoas: mild tightness    Patient was educated on pelvic floor anatomy, function, and dysfunction.   Patient was educated on an intra-vaginal pelvic floor assessment technique and verbalized consent.   The patient is aware they have full autonomy and can stop the assessment at any time.     Perineal Observation - At Rest: mild scar tissue present at 5-6 o'clock  Contraction: visible  Relaxation: visible    Bony Palpation: no tenderness to palpation  External Soft Tissue Palpation/Pelvic Clock: tightness/tenderness of adductors & glutes    Internal Intra-vaginal Exam  - Layer 1/2/3: layer 1: painful, sensitivity, tightness, spasm response to initial touch   - further assessment stopped due to pain, tightness, and discomfort   - demonstrated external soft tissue stretching around introitus for desensitization and scar tissue mobilizatio  9/20/24: continued tenderness and pain with perineal/direct scar tissue mobility - improved tolerance and tissue mobility with stretching just lateral to scar tissue    Treatments:   - Continue: diaphragmatic breathing, happy baby, cat/cow, child's pose  - Discussed and recommend self/partner PF: STM  - Discussed and provided dilator program, will not begin static dilation yet, only external and layer 1 partner STM  - Discussed symptoms of urgency or ineffective emptying are signs of high-tone pelvic floor    Access Code: [email or web address]  - provided and performed  - Supine Lower Trunk Rotation  - 3-5 x weekly -  10-30 reps  - Supine Pelvic Floor Stretch  - 3-5 x weekly - 3 sets - 5 breaths hold  - Supine Figure 4 Piriformis Stretch  - 3-5 x weekly - 3 sets - 20 seconds hold  - Sidelying Lumbar Rotation Stretch  - 3-5 x weekly - 3 sets - 20 seconds hold  - Cat Cow  - 3-5 x weekly - 10 reps  - Child's Pose Stretch  - 3-5 x weekly - 1-3 minutes hold  - Lying Prone  - 3-5 x weekly - 5-10 minutes hold  - Seated Cervical Flexion Stretch with Finger Support Behind Neck  - 1 x daily - 3 sets - 15 seconds hold  - Seated Upper Trapezius Stretch  - 1 x daily - 3 sets - 15 seconds hold    Plan for Next Visit: assess needed    Assessment: Patient presents with signs and symptoms consistent with 3rd degree perineal laceration during labor and delivery, resulting in limited participation in pain-free ADLs and inability to perform at their prior level of function. Pt would benefit from physical therapy to address the impairments found & listed previously in the objective section in order to return to safe and pain-free ADLs and prior level of function.  - emphasis on continued slow and gentle stretching of perineum and pelvic floor  - continued hip/lower back stretching and posture focus  - vulvar symptoms consistent with vaginismus, possible mild pudendal neuralgia, tight pelvic floor, core weakness, compensatory post-partum posture changes     Plan: as needed    Silvia Stanford, PT

## 2024-12-10 ENCOUNTER — DOCUMENTATION (OUTPATIENT)
Dept: PHYSICAL THERAPY | Facility: CLINIC | Age: 34
End: 2024-12-10
Payer: COMMERCIAL

## 2024-12-10 NOTE — PROGRESS NOTES
Physical Therapy    Discharge Summary    Name: Rex Murphy  MRN: [id number]  : 1990  Date: 12/10/24    Discharge Summary: PT    Discharge Information: Date of discharge 12/10/24    Rehab Discharge Reason: Achieved all and/or the most significant goals(s)    Silvia Stanford, PT

## 2025-02-21 ENCOUNTER — OFFICE VISIT (OUTPATIENT)
Dept: OBSTETRICS AND GYNECOLOGY | Facility: CLINIC | Age: 35
End: 2025-02-21
Payer: COMMERCIAL

## 2025-02-21 VITALS
WEIGHT: 139.2 LBS | DIASTOLIC BLOOD PRESSURE: 74 MMHG | HEIGHT: 61 IN | BODY MASS INDEX: 26.28 KG/M2 | SYSTOLIC BLOOD PRESSURE: 112 MMHG | HEART RATE: 90 BPM

## 2025-02-21 DIAGNOSIS — R10.2 PELVIC PAIN IN FEMALE: ICD-10-CM

## 2025-02-21 DIAGNOSIS — F41.8 POSTPARTUM ANXIETY: ICD-10-CM

## 2025-02-21 PROCEDURE — 3008F BODY MASS INDEX DOCD: CPT | Performed by: OBSTETRICS & GYNECOLOGY

## 2025-02-21 PROCEDURE — 99214 OFFICE O/P EST MOD 30 MIN: CPT | Performed by: OBSTETRICS & GYNECOLOGY

## 2025-02-21 ASSESSMENT — ENCOUNTER SYMPTOMS
CARDIOVASCULAR NEGATIVE: 1
RESPIRATORY NEGATIVE: 1
ENDOCRINE NEGATIVE: 1
EYES NEGATIVE: 1
PSYCHIATRIC NEGATIVE: 1
NEUROLOGICAL NEGATIVE: 1
MUSCULOSKELETAL NEGATIVE: 1
CONSTITUTIONAL NEGATIVE: 1
GASTROINTESTINAL NEGATIVE: 1

## 2025-02-21 ASSESSMENT — PAIN SCALES - GENERAL: PAINLEVEL_OUTOF10: 0-NO PAIN

## 2025-02-21 NOTE — PROGRESS NOTES
[facility] Department of Urogynecology   Tahmina Mahoney MD, MPH   [phone]    ASSESSMENT AND PLAN:   34 y.o. female presenting in the ERAD clinic following 3b perineal laceration s/p vaginal delivery on 24.         Diagnoses:   #1 3b perineal laceration   #2 Postpartum anxiety   #3 Future pregnancy and delivery concerns  #4 PP eclampsia     Plan:   1. 3b perineal laceration, perineal pain  - Continue PFPT as it has shown improvement.  - Consider topical estrogen cream if sxs persist, given the potential for low estrogen levels due to breastfeeding.  - Reassured her that her anatomy appears normal and healing is progressing well.  - Upon exam, there's a little extra skin, not necessarily scar tissue, with some sensitivity noted on the right side. No obvious scar tissue or abnormal anatomy observed.   - Exam suggests muscle pain with gentle pressure applied.     2. Future pregnancy and delivery concerns  - Discussed risk of perineal tears in future deliveries, which is slightly higher due to previous tear but still low overall.  - She is considering vaginal delivery versus  for future pregnancies.  - Reassured that she is a candidate for vaginal delivery if desired, but a  is an option if she chooses.     3. Postpartum anxiety, lack of sleep   - She reports improvement in mood and sleep.    Follow-up with Dr. Mahoney in 6 months or PRN sooner if sxs worsen or new concerns arise. Continue with PFPT and consider FUV with Isamar Gill for PP anxiety if needed.    Scribe Attestation  By signing my name below, Liam WILSON Scribe, attest that this documentation has been prepared under the direction and in the presence of Tahmina Mahoney MD MPH on 2025 at 3:55 PM.    Problem List Items Addressed This Visit    None  Visit Diagnoses       Type 3b perineal laceration (HHS-HCC)    -  Primary    Postpartum eclampsia (HHS-HCC)        Postpartum anxiety        Pelvic pain in female                I spent a total of 30 minutes in face to face and non face to face time.     I Dr. Mahoney, personally performed the services described in the documentation as scribed in my presence and confirm it is both complete and accurate.  Tahmina Mahoney MD, MPH, FACOG       Tahmina Mahoney MD, MPH, FACOG     Established    HISTORY OF PRESENT ILLNESS:      Record Review:   - 8/30/2024 Dr. Mahoney note RE: 3b perineal laceration - healing well, endorses some soreness with sutures that will improve upon dissolving. She can return to intercourse in 1 month. In the meantime, work with PFPT. Use a silicone-based lube. Discussed low estrogen from breastfeeding. LLQ pain - Likely related to constipation or a muscle spasm. If her bowels are regular, she should continue with PFPT. Postpartum anxiety, lack of sleep - She can pump every 4 hours at night instead of every 3 hours to improve sleep. Counseled her on the importance of prioritizing herself, continue with Dr. Isamar Gill to manage postpartum anxiety after her traumatic experience with eclampsia.  - 8/21/24 Neurology (Dr. Burgess) note: Continue seizure precautions and restrictions during maternity leave (planned to continue through October). Check in near the end of her maternity leave to see if there are any concerns for recurrence. Record with phone video any future events to review and help establish a diagnosis. Go to the ED if any future events with loss of consciousness or concerns for injury. Follow-up as needed.   - 8/10/24 ED note: PMH hx of presumed eclamptic seizure presents with similar prodrome of R hand spasms and felling of impending doom.   - 7/26/24 Dr. Tahmina Mahoney note reviewed:  3b perineal laceration - well healing incision, referred to PFPT. She is breastfeeding. Trimmed suture material to help with itching and irritation. Sent rx for Triamcinolone 0.1% ointment. Given list of vulvar care measures. Swab taken to rule out BV and vaginitis swab  was negative. Referred to Isamar Gill for anxiety.   - 24 Dr. Mahoney note:  3b perineal laceration - superficial separation at posterior introitus and some swelling, use Sitz bath, ice packs, donut pillows, and Proctofoam. Take Ibuprofen and Tylenol for pain. Rx: Augmentin. Referred to PFPT. Referred to OBGYN behavioral health for anxiety. Follows with OB for eclampsia. She is breastfeeding and baby had a tongue tie,   - 24 Discharge Summary for Eclampsia: Now postpartum day #8 from [facility], readmitted with persistent severe range blood pressures. Patient had previously had  on , complicated by 3b lac, had 1 mild range blood pressure. Was routinely discharged home on , then on 7/10 represented after a suspected eclamptic seizure at [facility]. Received 24 hrs pp Mg, was normotensive, did not require PO BP meds. Had HA that resolved, MRI/A/V and EEG and neuro consult, negative. Prior HELLP labs n/f mild transaminitis that resolved prior to dc.  7/10, s/p normal echo . Patient then represented this admission on  with severe range Bps requiring IV treatment with hydral 5, was started on nifed 30 and a course of lasix. On  was normotensive, asymptomatic, and stable for discharge home with return precautions.     ERAD Symptoms:  - She has occasional sharp pain during urination and when wiping, possibly due to scar tissue.  - She is still breast feeding.  - Emma was painful initially, particularly when the scar tissue was contacted, but has improved significantly.  - PFPT has been beneficial and she is continuing at-home exercises.  - She had postpartum anxiety and sleep deprivation, but reports feeling a lot better now.  - Mood described as great, and she is managing well with the baby and family.  - She is considering the possibility of another child and is concerned about the risk of another perineal tear.  - She is contemplating the timing of a future pregnancy and is  "advised to wait until the baby is 15-18 months old due to previous postpartum preeclampsia.       Past Medical History:     Past Medical History:   Diagnosis Date    Syncope and collapse 09/02/2021    Near syncope          Past Surgical History:     Past Surgical History:   Procedure Laterality Date    OTHER SURGICAL HISTORY  09/02/2021    No history of surgery         Medications:     Prior to Admission medications    Medication Sig Start Date End Date Taking? Authorizing Provider   NIFEdipine ER (Adalat CC) 30 mg 24 hr tablet Take 1 tablet (30 mg) by mouth once every 24 hours. Do not crush, chew, or split.  Patient not taking: Reported on 2/21/2025 7/13/24   MD JAZIEL Barba  Review of Systems   Constitutional: Negative.    HENT: Negative.     Eyes: Negative.    Respiratory: Negative.     Cardiovascular: Negative.    Gastrointestinal: Negative.    Endocrine: Negative.    Genitourinary:         Occasional sharp pain during urination and when wiping, improved intercourse experience, no difficulty controlling stool or urine, no urinary leakage, improvement in PF muscle sensitivity and pain   Musculoskeletal: Negative.    Neurological: Negative.    Psychiatric/Behavioral: Negative.            PHYSICAL EXAM:    /74 (Patient Position: Sitting)   Pulse 90   Ht 1.549 m (5' 1\")   Wt 63.1 kg (139 lb 3.2 oz)   LMP 02/04/2025   Breastfeeding Yes   BMI 26.30 kg/m²   Patient's last menstrual period was 02/04/2025.    Declines chaperone for physical exam.      Well developed, well nourished, in no apparent distress.   Neurologic/Psychiatric:  Awake, Alert and Oriented times 3.  Affect normal.     Physical Exam  Genitourinary:      Genitourinary Comments: A little extra skin, not necessarily scar tissue, with some sensitivity noted on the right side. No obvious scar tissue or abnormal anatomy observed. Exam suggests muscle pain with gentle pressure applied.           Data and DIAGNOSTIC STUDIES REVIEWED "   No results found.   Lab Results   Component Value Date    URINECULTURE No significant growth 07/09/2024      Lab Results   Component Value Date    GLUCOSE 92 08/10/2024    CALCIUM 9.4 08/10/2024     08/10/2024    K 3.9 08/10/2024    CO2 25 08/10/2024     08/10/2024    BUN 14 08/10/2024    CREATININE 0.52 08/10/2024     Lab Results   Component Value Date    WBC 5.9 08/10/2024    HGB 12.6 08/10/2024    HCT 37.6 08/10/2024    MCV 87 08/10/2024     08/10/2024

## 2025-03-31 ENCOUNTER — TELEPHONE (OUTPATIENT)
Dept: OBSTETRICS AND GYNECOLOGY | Facility: CLINIC | Age: 35
End: 2025-03-31
Payer: COMMERCIAL

## 2025-03-31 NOTE — TELEPHONE ENCOUNTER
I spoke to Dr Cho regarding pts concerns and he advised she schedule appointment with her PVP. Pt notified

## 2025-04-25 ENCOUNTER — APPOINTMENT (OUTPATIENT)
Dept: PRIMARY CARE | Facility: CLINIC | Age: 35
End: 2025-04-25
Payer: COMMERCIAL